# Patient Record
Sex: MALE | Race: WHITE | NOT HISPANIC OR LATINO | Employment: OTHER | URBAN - METROPOLITAN AREA
[De-identification: names, ages, dates, MRNs, and addresses within clinical notes are randomized per-mention and may not be internally consistent; named-entity substitution may affect disease eponyms.]

---

## 2017-01-23 ENCOUNTER — OFFICE VISIT (OUTPATIENT)
Dept: INTERNAL MEDICINE | Facility: CLINIC | Age: 77
End: 2017-01-23
Payer: COMMERCIAL

## 2017-01-23 VITALS
BODY MASS INDEX: 25.06 KG/M2 | OXYGEN SATURATION: 97 % | HEART RATE: 64 BPM | DIASTOLIC BLOOD PRESSURE: 64 MMHG | SYSTOLIC BLOOD PRESSURE: 126 MMHG | TEMPERATURE: 97.1 F | RESPIRATION RATE: 16 BRPM | HEIGHT: 71 IN | WEIGHT: 179 LBS

## 2017-01-23 DIAGNOSIS — K21.9 GASTROESOPHAGEAL REFLUX DISEASE WITHOUT ESOPHAGITIS: Primary | ICD-10-CM

## 2017-01-23 DIAGNOSIS — I10 ESSENTIAL HYPERTENSION WITH GOAL BLOOD PRESSURE LESS THAN 140/90: ICD-10-CM

## 2017-01-23 DIAGNOSIS — E78.5 HYPERLIPIDEMIA LDL GOAL <130: ICD-10-CM

## 2017-01-23 DIAGNOSIS — Z23 NEED FOR PROPHYLACTIC VACCINATION AND INOCULATION AGAINST INFLUENZA: ICD-10-CM

## 2017-01-23 DIAGNOSIS — E11.9 TYPE 2 DIABETES MELLITUS WITHOUT COMPLICATION, WITHOUT LONG-TERM CURRENT USE OF INSULIN (H): ICD-10-CM

## 2017-01-23 LAB
ALBUMIN SERPL-MCNC: 4.2 G/DL (ref 3.4–5)
ALP SERPL-CCNC: 54 U/L (ref 40–150)
ALT SERPL W P-5'-P-CCNC: 30 U/L (ref 0–70)
ANION GAP SERPL CALCULATED.3IONS-SCNC: 5 MMOL/L (ref 3–14)
AST SERPL W P-5'-P-CCNC: 15 U/L (ref 0–45)
BILIRUB SERPL-MCNC: 0.6 MG/DL (ref 0.2–1.3)
BUN SERPL-MCNC: 16 MG/DL (ref 7–30)
CALCIUM SERPL-MCNC: 9.2 MG/DL (ref 8.5–10.1)
CHLORIDE SERPL-SCNC: 102 MMOL/L (ref 94–109)
CHOLEST SERPL-MCNC: 113 MG/DL
CO2 SERPL-SCNC: 34 MMOL/L (ref 20–32)
CREAT SERPL-MCNC: 1.09 MG/DL (ref 0.66–1.25)
CREAT UR-MCNC: 205 MG/DL
GFR SERPL CREATININE-BSD FRML MDRD: 66 ML/MIN/1.7M2
GLUCOSE SERPL-MCNC: 133 MG/DL (ref 70–99)
HBA1C MFR BLD: 7 % (ref 4.3–6)
HDLC SERPL-MCNC: 39 MG/DL
LDLC SERPL CALC-MCNC: 60 MG/DL
MICROALBUMIN UR-MCNC: 9 MG/L
MICROALBUMIN/CREAT UR: 4.35 MG/G CR (ref 0–17)
NONHDLC SERPL-MCNC: 74 MG/DL
POTASSIUM SERPL-SCNC: 3.8 MMOL/L (ref 3.4–5.3)
PROT SERPL-MCNC: 7.2 G/DL (ref 6.8–8.8)
SODIUM SERPL-SCNC: 141 MMOL/L (ref 133–144)
TRIGL SERPL-MCNC: 69 MG/DL

## 2017-01-23 PROCEDURE — 36415 COLL VENOUS BLD VENIPUNCTURE: CPT | Performed by: INTERNAL MEDICINE

## 2017-01-23 PROCEDURE — 82043 UR ALBUMIN QUANTITATIVE: CPT | Performed by: INTERNAL MEDICINE

## 2017-01-23 PROCEDURE — 90471 IMMUNIZATION ADMIN: CPT | Performed by: INTERNAL MEDICINE

## 2017-01-23 PROCEDURE — G0008 ADMIN INFLUENZA VIRUS VAC: HCPCS | Performed by: INTERNAL MEDICINE

## 2017-01-23 PROCEDURE — 80053 COMPREHEN METABOLIC PANEL: CPT | Performed by: INTERNAL MEDICINE

## 2017-01-23 PROCEDURE — 83036 HEMOGLOBIN GLYCOSYLATED A1C: CPT | Performed by: INTERNAL MEDICINE

## 2017-01-23 PROCEDURE — 80061 LIPID PANEL: CPT | Performed by: INTERNAL MEDICINE

## 2017-01-23 PROCEDURE — 99214 OFFICE O/P EST MOD 30 MIN: CPT | Performed by: INTERNAL MEDICINE

## 2017-01-23 PROCEDURE — 90662 IIV NO PRSV INCREASED AG IM: CPT | Performed by: INTERNAL MEDICINE

## 2017-01-23 RX ORDER — AMILORIDE HYDROCHLORIDE AND HYDROCHLOROTHIAZIDE 5; 50 MG/1; MG/1
TABLET ORAL
Qty: 90 TABLET | Refills: 3 | Status: SHIPPED | OUTPATIENT
Start: 2017-01-23 | End: 2018-03-05

## 2017-01-23 RX ORDER — ATORVASTATIN CALCIUM 40 MG/1
40 TABLET, FILM COATED ORAL DAILY
Qty: 90 TABLET | Refills: 3 | Status: SHIPPED | OUTPATIENT
Start: 2017-01-23 | End: 2018-04-26

## 2017-01-23 ASSESSMENT — PAIN SCALES - GENERAL: PAINLEVEL: NO PAIN (0)

## 2017-01-23 NOTE — NURSING NOTE
"Chief Complaint   Patient presents with     Diabetes     Lipids     Hypertension       Initial /64 mmHg  Pulse 64  Temp(Src) 97.1  F (36.2  C) (Temporal)  Resp 16  Ht 5' 11.25\" (1.81 m)  Wt 179 lb (81.194 kg)  BMI 24.78 kg/m2  SpO2 97% Estimated body mass index is 24.78 kg/(m^2) as calculated from the following:    Height as of this encounter: 5' 11.25\" (1.81 m).    Weight as of this encounter: 179 lb (81.194 kg).  BP completed using cuff size: cori Brown MA    "

## 2017-01-23 NOTE — PROGRESS NOTES
SUBJECTIVE:                                                    Merlin J Koppendrayer is a 76 year old male who presents to clinic today for the following health issues:      Chief Complaint   Patient presents with     Diabetes     Lipids     Hypertension       He is doing ok, taking medications.  Weight is stable. Exercising 2 times a week and goes to the gym.  Does treadmill and machines.      Checking sugars daily, 128 today, up a little over the holidays.  Pretty good overall.      He denies any chest pains, sob, no n,v,d,c. Has some frequent urination, nocturia times one.    Past Medical History   Diagnosis Date     Unspecified essential hypertension      Unspecified hearing loss      Esophageal reflux      Inguinal hernia without mention of obstruction or gangrene, unilateral or unspecified, (not specified as recurrent)      s/p repair     Prostatitis, unspecified      Pyelonephritis, unspecified      Hypersomnia with sleep apnea, unspecified      Other isolated or specific phobias      claustrophobia     Mixed hyperlipidemia      Diabetic eye exam (H) 12/17/14     Diabetes (H)      Current Outpatient Prescriptions   Medication     omeprazole (PRILOSEC) 20 MG CR capsule     atorvastatin (LIPITOR) 40 MG tablet     aMILoride-hydrochlorothiazide (MODURETIC) 5-50 MG TABS per tablet     amLODIPine (NORVASC) 5 MG tablet     metFORMIN (GLUCOPHAGE) 500 MG tablet     potassium chloride SA (K-DUR/KLOR-CON M) 20 MEQ CR tablet     metoprolol (LOPRESSOR) 50 MG tablet     ASPIRIN 81 MG OR TABS     ACCU-CHEK SMARTVIEW test strip     [DISCONTINUED] atorvastatin (LIPITOR) 40 MG tablet     [DISCONTINUED] omeprazole (PRILOSEC) 20 MG capsule     [DISCONTINUED] aMILoride-hydrochlorothiazide (MODURETIC) 5-50 MG TABS     acetaminophen (TYLENOL) 325 MG tablet     ACCU-CHEK FASTCLIX LANCETS MISC     No current facility-administered medications for this visit.     Social History   Substance Use Topics     Smoking status: Former Smoker  "    Quit date: 01/01/1974     Smokeless tobacco: Never Used      Comment: Quit 1974      Alcohol Use: 8.4 oz/week     14 Standard drinks or equivalent per week     Review of Systems  Constitutional-No fevers, chills, or weight changes..  ENT-No earpain, sore throat, voice changes or rhinitis.  Cardiac-No chest pain or palpitations.  Respiratory-No cough, sob, or hemoptysis.  GI-No nausea, vomitting, diarrhea, constipation, or blood in the stool.  Musculoskeletal-No muscles aches or joint pains.  Skin-No rashes.    Physical Exam  /64 mmHg  Pulse 64  Temp(Src) 97.1  F (36.2  C) (Temporal)  Resp 16  Ht 5' 11.25\" (1.81 m)  Wt 179 lb (81.194 kg)  BMI 24.78 kg/m2  SpO2 97%  General Appearance-healthy, alert, no distress  ENT-ENT exam normal, no neck nodes or sinus tenderness  Cardiac-regular rate and rhythm  with normal S1, S2 ; no murmur, rub or gallops  Lungs-clear to auscultation  GI-Soft, nontender.  Normal bowel sounds.  No hepatosplenomegaly or abnormal masses  Extremities-no peripheral edema, peripheral pulses normal    ASSESSMENT:  GERD-he is doing ok, will have him try to cut down on the PPI use if he has no symptoms.  Then take a little more if there is a flare up.    Hyperlipidemia-has atorvastatin and is doing ok, will check lipids since he is fasting, will also check lfts and refill for the year.    HTN is controlled on three medications, will check renal function, continue to exercise, keep weight down and refill medications when needed.    Diabetes is doing well, just on metformin, todays sugar was good at 128, goes up with dietary lapses, will check a hgba1c today.  Not on a ace inhibitor but as long as microalbumin is ok, will wait on lisinopril.    Electronically signed by Zaid Oneill MD        Electronically signed by Zaid Oneill MD    "

## 2017-01-23 NOTE — PROGRESS NOTES
Injectable Influenza Immunization Documentation    1.  Is the person to be vaccinated sick today?  No    2. Does the person to be vaccinated have an allergy to eggs or to a component of the vaccine?  No    3. Has the person to be vaccinated today ever had a serious reaction to influenza vaccine in the past?  No    4. Has the person to be vaccinated ever had Guillain-[place] syndrome?  No     Form completed by Maryann Brown MA

## 2017-01-23 NOTE — MR AVS SNAPSHOT
"              After Visit Summary   1/23/2017    Merlin J Koppendrayer    MRN: [id number]           Patient Information     Date Of Birth          1940        Visit Information        Provider Department      1/23/2017 8:15 AM Zaid Oneill MD [facility]        Today's Diagnoses     Gastroesophageal reflux disease without esophagitis    -  1        Follow-ups after your visit        Who to contact     If you have questions or need follow up information about today's clinic visit or your schedule please contact [facility] directly at [phone].  Normal or non-critical lab and imaging results will be communicated to you by [employer], letter or phone within 4 business days after the clinic has received the results. If you do not hear from us within 7 days, please contact the clinic through [employer] or phone. If you have a critical or abnormal lab result, we will notify you by phone as soon as possible.  Submit refill requests through "[employer]" or call your pharmacy and they will forward the refill request to us. Please allow 3 business days for your refill to be completed.          Additional Information About Your Visit        MyChart Information     "[employer]" gives you secure access to your electronic health record. If you see a primary care provider, you can also send messages to your care team and make appointments. If you have questions, please call your primary care clinic.  If you do not have a primary care provider, please call [phone] and they will assist you.        Care EveryWhere ID     This is your Care EveryWhere ID. This could be used by other organizations to access your [place] medical records  [id number]        Your Vitals Were     Pulse Temperature Respirations Height BMI (Body Mass Index) Pulse Oximetry    64 97.1  F (36.2  C) (Temporal) 16 5' 11.25\" (1.81 m) 24.78 kg/m2 97%       Blood Pressure from Last 3 Encounters:   01/23/17 126/64   01/20/16 128/68 "   08/25/15 110/76    Weight from Last 3 Encounters:   01/23/17 179 lb (81.194 kg)   01/20/16 178 lb (80.74 kg)   09/02/15 175 lb (79.379 kg)              Today, you had the following     No orders found for display       Primary Care Provider Office Phone # Fax #    Zaid Oneill [phone]       [place] [id number] [place] [address]        Thank you!     Thank you for choosing [facility]  for your care. Our goal is always to provide you with excellent care. Hearing back from our patients is one way we can continue to improve our services. Please take a few minutes to complete the written survey that you may receive in the mail after your visit with us. Thank you!             Your Updated Medication List - Protect others around you: Learn how to safely use, store and throw away your medicines at www.disposemymeds.org.          This list is accurate as of: 1/23/17  8:25 AM.  Always use your most recent med list.                   Brand Name Dispense Instructions for use    ACCU-CHEK SMARTVIEW test strip   Generic drug:  blood glucose monitoring     200 each    USE ONE STRIP TWICE DAILY TO  CHECK  BLOOD  SUGAR  VARYING  THE  TIMES       aMILoride-hydrochlorothiazide 5-50 MG Tabs per tablet    MODURETIC    90 tablet    TAKE 1 TABLET EVERY DAY       amLODIPine 5 MG tablet    NORVASC    90 tablet    TAKE 1 TABLET EVERY DAY       aspirin 81 MG tablet     100    1 tab po QD (Once per day)       atorvastatin 40 MG tablet    LIPITOR    90 tablet    Take 1 tablet (40 mg) by mouth daily       blood glucose monitoring lancets     250 each    Use 1 needle 2-3 times       metFORMIN 500 MG tablet    GLUCOPHAGE    90 tablet    TAKE 1 TABLET  DAILY  WITH  DINNER       metoprolol 50 MG tablet    LOPRESSOR    270 tablet    Take 2 in the AM and 1 at night       omeprazole 20 MG CR capsule    priLOSEC    90 capsule    Take 1 capsule (20 mg) by mouth daily       potassium  chloride SA 20 MEQ CR tablet    K-DUR/KLOR-CON M    270 tablet    TAKE 1 TABLET THREE TIMES DAILY       TYLENOL 325 MG tablet   Generic drug:  acetaminophen      Take 325-650 mg by mouth every 6 hours as needed for mild pain

## 2017-01-23 NOTE — PROGRESS NOTES
Screening Questionnaire for Adult Immunization    Are you sick today?   No   Do you have allergies to medications, food, a vaccine component or latex?   Yes   Have you ever had a serious reaction after receiving a vaccination?   No   Do you have a long-term health problem with heart disease, lung disease, asthma, kidney disease, metabolic disease (e.g. diabetes), anemia, or other blood disorder?   Yes   Do you have cancer, leukemia, HIV/AIDS, or any other immune system problem?   No   In the past 3 months, have you taken medications that affect  your immune system, such as prednisone, other steroids, or anticancer drugs; drugs for the treatment of rheumatoid arthritis, Crohn s disease, or psoriasis; or have you had radiation treatments?   No   Have you had a seizure, or a brain or other nervous system problem?   No   During the past year, have you received a transfusion of blood or blood     products, or been given immune (gamma) globulin or antiviral drug?   No   For women: Are you pregnant or is there a chance you could become        pregnant during the next month?   No   Have you received any vaccinations in the past 4 weeks?   No     Immunization questionnaire was positive for at least one answer.  Notified yes.      MNVFC doesn't apply on this patient    Per orders of Dr. Zaid Oneill, injection of influenza given by Maryann Borwn. Patient instructed to remain in clinic for 20 minutes afterwards, and to report any adverse reaction to me immediately.       Screening performed by Maryann Brown on 1/23/2017 at 9:05 AM.

## 2017-02-09 ENCOUNTER — TELEPHONE (OUTPATIENT)
Dept: INTERNAL MEDICINE | Facility: CLINIC | Age: 77
End: 2017-02-09

## 2017-02-09 NOTE — TELEPHONE ENCOUNTER
Reason for call:  Patient reporting a symptom    Symptom or request: Headache and neck ache    Duration (how long have symptoms been present): 4 days    Have you been treated for this before? No    Additional comments: Pt's wife is calling trying to make appt either today or tomorrow in [place] or [place]. There are no openings. They aren't sure what's going on. Please call and advise.     Phone Number patient can be reached at:  Home number on file [phone] (home)    Best Time:  anytime    Can we leave a detailed message on this number:  Not Applicable    Call taken on 2/9/2017 at 11:59 AM by Geeta Brown

## 2017-02-09 NOTE — TELEPHONE ENCOUNTER
": 1940  PHONE #'s: [phone] (home)     PRESENTING PROBLEM:  Headache since Monday. NO Hx of migraine headaches.     NURSING ASSESSMENT  Description:\"Just kind of dull.\"  Onset/duration:  17  Precip. factors:  Etiology unknown. Denies injury. NO Hx of migraines.   Assoc. Sx:  NO fever, no one sided weakness, no vision changes. Some light sensitivity. NO chest pain or jaw pain.  \" I do kind of have a sttiff neck  Improves/worsens Sx:  Worse, that's why calling. Can't seem to get rid of his headache.   Pain scale (1-10)   4/10  Sx specific meds:  Has tried Ibuprofen, ASA, Tylenol  Last exam/Tx:  Has NOT been seen for this.     RECOMMENDED DISPOSITION:  See in 24 hours - Given appt at [facility] on 2/10/17.   Will comply with recommendation: YES  If further questions/concerns or if Sx do not improve, worsen or new Sx develop, call your PCP or [facility] as soon as possible.    NOTES:  Disposition was determined by the first positive assessment question, therefore all previous assessment questions were negative.  Informed to check provider manual or call insurance company to assure coverage.    Guideline used: Headache  Telephone Triage Protocols for Nurses, Fifth Edition, Angie Du RN  2017    "

## 2017-02-10 ENCOUNTER — OFFICE VISIT (OUTPATIENT)
Dept: FAMILY MEDICINE | Facility: OTHER | Age: 77
End: 2017-02-10
Payer: COMMERCIAL

## 2017-02-10 VITALS
DIASTOLIC BLOOD PRESSURE: 62 MMHG | TEMPERATURE: 97.8 F | RESPIRATION RATE: 20 BRPM | WEIGHT: 176 LBS | HEART RATE: 66 BPM | BODY MASS INDEX: 24.37 KG/M2 | SYSTOLIC BLOOD PRESSURE: 130 MMHG | OXYGEN SATURATION: 98 %

## 2017-02-10 DIAGNOSIS — R51.9 ACUTE INTRACTABLE HEADACHE, UNSPECIFIED HEADACHE TYPE: Primary | ICD-10-CM

## 2017-02-10 DIAGNOSIS — R29.818 OTHER SYMPTOMS AND SIGNS INVOLVING THE NERVOUS SYSTEM: ICD-10-CM

## 2017-02-10 DIAGNOSIS — R51.0 POSITIONAL HEADACHE: ICD-10-CM

## 2017-02-10 DIAGNOSIS — G50.1 ATYPICAL FACIAL PAIN: ICD-10-CM

## 2017-02-10 PROCEDURE — 99214 OFFICE O/P EST MOD 30 MIN: CPT | Performed by: NURSE PRACTITIONER

## 2017-02-10 ASSESSMENT — PAIN SCALES - GENERAL: PAINLEVEL: MODERATE PAIN (5)

## 2017-02-10 NOTE — MR AVS SNAPSHOT
After Visit Summary   2/10/2017    Merlin J Koppendrayer    MRN: [id number]           Patient Information     Date Of Birth          1940        Visit Information        Provider Department      2/10/2017 2:20 PM Ariana Campoverde APRN CNP [facility]        Today's Diagnoses     Acute intractable headache, unspecified headache type    -  1       Care Instructions    Schedule the MRI at the [facility].             Follow-ups after your visit        Future tests that were ordered for you today     Open Future Orders        Priority Expected Expires Ordered    MRA Head w/o Contrast Angiogram Routine  2/10/2018 2/10/2017    MR Brain w/o & w Contrast Routine  2/10/2018 2/10/2017            Who to contact     If you have questions or need follow up information about today's clinic visit or your schedule please contact [facility] directly at [phone].  Normal or non-critical lab and imaging results will be communicated to you by MyChart, letter or phone within 4 business days after the clinic has received the results. If you do not hear from us within 7 days, please contact the clinic through [employer] or phone. If you have a critical or abnormal lab result, we will notify you by phone as soon as possible.  Submit refill requests through [employer] or call your pharmacy and they will forward the refill request to us. Please allow 3 business days for your refill to be completed.          Additional Information About Your Visit        MyChart Information     [employer] gives you secure access to your electronic health record. If you see a primary care provider, you can also send messages to your care team and make appointments. If you have questions, please call your primary care clinic.  If you do not have a primary care provider, please call [phone] and they will assist you.        Care EveryWhere ID     This is your Care EveryWhere ID. This could be used by other  organizations to access your [place] medical records  [id number]        Your Vitals Were     Pulse Temperature Respirations Pulse Oximetry          66 97.8  F (36.6  C) (Tympanic) 20 98%         Blood Pressure from Last 3 Encounters:   02/10/17 130/62   01/23/17 126/64   01/20/16 128/68    Weight from Last 3 Encounters:   02/10/17 176 lb (79.833 kg)   01/23/17 179 lb (81.194 kg)   01/20/16 178 lb (80.74 kg)               Primary Care Provider Office Phone # Fax #    Zaid Oneill [phone]       [place] [id number] [place] [address]        Thank you!     Thank you for choosing [facility]  for your care. Our goal is always to provide you with excellent care. Hearing back from our patients is one way we can continue to improve our services. Please take a few minutes to complete the written survey that you may receive in the mail after your visit with us. Thank you!             Your Updated Medication List - Protect others around you: Learn how to safely use, store and throw away your medicines at www.disposemymeds.org.          This list is accurate as of: 2/10/17  3:00 PM.  Always use your most recent med list.                   Brand Name Dispense Instructions for use    ACCU-CHEK SMARTVIEW test strip   Generic drug:  blood glucose monitoring     200 each    USE ONE STRIP TWICE DAILY TO  CHECK  BLOOD  SUGAR  VARYING  THE  TIMES       aMILoride-hydrochlorothiazide 5-50 MG Tabs per tablet    MODURETIC    90 tablet    TAKE 1 TABLET EVERY DAY       amLODIPine 5 MG tablet    NORVASC    90 tablet    TAKE 1 TABLET EVERY DAY       aspirin 81 MG tablet     100    1 tab po QD (Once per day)       atorvastatin 40 MG tablet    LIPITOR    90 tablet    Take 1 tablet (40 mg) by mouth daily       blood glucose monitoring lancets     250 each    Use 1 needle 2-3 times       metFORMIN 500 MG tablet    GLUCOPHAGE    90 tablet    TAKE 1 TABLET  DAILY  WITH  DINNER        metoprolol 50 MG tablet    LOPRESSOR    270 tablet    Take 2 in the AM and 1 at night       omeprazole 20 MG CR capsule    priLOSEC    90 capsule    Take 1 capsule (20 mg) by mouth daily       potassium chloride SA 20 MEQ CR tablet    K-DUR/KLOR-CON M    270 tablet    TAKE 1 TABLET THREE TIMES DAILY       TYLENOL 325 MG tablet   Generic drug:  acetaminophen      Take 325-650 mg by mouth every 6 hours as needed for mild pain

## 2017-02-10 NOTE — NURSING NOTE
"Chief Complaint   Patient presents with     Headache       Initial /62 mmHg  Pulse 66  Temp(Src) 97.8  F (36.6  C) (Tympanic)  Resp 20  Wt 176 lb (79.833 kg)  SpO2 98% Estimated body mass index is 24.37 kg/(m^2) as calculated from the following:    Height as of 1/23/17: 5' 11.25\" (1.81 m).    Weight as of this encounter: 176 lb (79.833 kg).  Medication Reconciliation: complete   ................Panda Lorenz LPN,   February 10, 2017,      2:48 PM,   [facility]      "

## 2017-02-10 NOTE — PROGRESS NOTES
SUBJECTIVE:                                                    Merlin J Koppendrayer is a 76 year old male who presents to clinic today for the following health issues:      Headaches      Duration: 5 days    Description  Location: back of head.  Does go into shoulder and neck a little bit  Character: dull pain  Frequency:  Every day this week  Duration:  4-5 hrs    Intensity:  moderate, 5/10    Accompanying signs and symptoms:    Precipitating or Alleviating factors:  Nausea/vomiting: no  Dizziness: no  Weakness or numbness: no  Visual changes: none  Fever: no   Sinus or URI symptoms no     History  Head trauma: no  Family history of migraines: no  Previous tests for headaches: no  Neurologist evaluations: no  Able to do daily activities when headache present: no  Wake with headaches: YES- slight  Daily pain medication use: YES  Any changes in: none    Precipitating or Alleviating factors (light/sound/sleep/caffeine): light sensitive    Therapies tried and outcome: Aspirin, Ibuprofen (Advil, Motrin) and Tylenol    Outcome - ibuprofen helped  Frequent/daily pain medication use: YES     No history of headaches or migraines.  Now has new onset headache x 5 days.  It is improved by Ibuprofen, but never goes completely away.  No other symptoms at all.  Has not been ill recently.  Headache is worse with exertion and position changes.     Problem list and histories reviewed & adjusted, as indicated.  Additional history: none    Patient Active Problem List   Diagnosis     Hearing loss     Inguinal hernia     Prostatitis     Pyelonephritis     HYPERLIPIDEMIA LDL GOAL <130     Advanced directives, counseling/discussion     MICHELLE (obstructive sleep apnea)     Anxiety attack     Type 2 diabetes mellitus without complications (H)     Gastroesophageal reflux disease without esophagitis     Essential hypertension with goal blood pressure less than 140/90     Past Surgical History   Procedure Laterality Date     C lap,hernia  repair proc,unlist       Colonoscopy  04/15/09     Excise mass foot Right 7/10/2015     Procedure: EXCISE MASS FOOT;  Surgeon: Nickolas Farah DPM;  Location: [facility] OR     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     Procedure: REPAIR SYNDACTYLY FOOT;  Surgeon: Nickolas Farah DPM;  Location: [facility] OR     Amputate toe(s) Right 7/31/2015     Procedure: AMPUTATE TOE(S);  Surgeon: Neal Slater MD;  Location: [facility] OR       Social History   Substance Use Topics     Smoking status: Former Smoker     Quit date: 01/01/1974     Smokeless tobacco: Never Used      Comment: Quit 1974      Alcohol Use: 8.4 oz/week     14 Standard drinks or equivalent per week     Family History   Problem Relation Age of Onset     DIABETES Father      DIABETES Mother      CEREBROVASCULAR DISEASE Mother      Cancer - colorectal Maternal Uncle      Connective Tissue Disorder Paternal Uncle      lupus     Connective Tissue Disorder Other      nieces and nephews with lupus     C.A.D. Brother      x 2         Current Outpatient Prescriptions   Medication Sig Dispense Refill     omeprazole (PRILOSEC) 20 MG CR capsule Take 1 capsule (20 mg) by mouth daily 90 capsule 3     atorvastatin (LIPITOR) 40 MG tablet Take 1 tablet (40 mg) by mouth daily 90 tablet 3     aMILoride-hydrochlorothiazide (MODURETIC) 5-50 MG TABS per tablet TAKE 1 TABLET EVERY DAY 90 tablet 3     amLODIPine (NORVASC) 5 MG tablet TAKE 1 TABLET EVERY DAY 90 tablet 0     metFORMIN (GLUCOPHAGE) 500 MG tablet TAKE 1 TABLET  DAILY  WITH  DINNER 90 tablet 0     potassium chloride SA (K-DUR/KLOR-CON M) 20 MEQ CR tablet TAKE 1 TABLET THREE TIMES DAILY 270 tablet 3     metoprolol (LOPRESSOR) 50 MG tablet Take 2 in the AM and 1 at night 270 tablet 0     ACCU-CHEK SMARTVIEW test strip USE ONE STRIP TWICE DAILY TO  CHECK  BLOOD  SUGAR  VARYING  THE  TIMES 200 each 1     acetaminophen (TYLENOL) 325 MG tablet Take 325-650 mg by mouth every 6 hours as needed for mild pain       ACCU-CHEK  FASTCLIX LANCETS MISC Use 1 needle 2-3 times 250 each 3     ASPIRIN 81 MG OR TABS 1 tab po QD (Once per day) 100 3     Allergies   Allergen Reactions     Sulfa Drugs      rash     BP Readings from Last 3 Encounters:   02/10/17 130/62   01/23/17 126/64   01/20/16 128/68    Wt Readings from Last 3 Encounters:   02/10/17 176 lb (79.833 kg)   01/23/17 179 lb (81.194 kg)   01/20/16 178 lb (80.74 kg)            ROS:  C: NEGATIVE for fever, chills, change in weight  E/M: NEGATIVE for ear, mouth and throat problems  R: NEGATIVE for significant cough or SOB  CV: NEGATIVE for chest pain, palpitations or peripheral edema  NEURO: POSITIVE for headache as above, NEGATIVE for weakness, dizziness, vision changes or numbness     OBJECTIVE:                                                    /62 mmHg  Pulse 66  Temp(Src) 97.8  F (36.6  C) (Tympanic)  Resp 20  Wt 176 lb (79.833 kg)  SpO2 98%  Body mass index is 24.37 kg/(m^2).  GENERAL: healthy, alert and no distress  HENT: ear canals and TM's normal, nose and mouth without ulcers or lesions  NECK: no adenopathy, no asymmetry, masses, or scars and thyroid normal to palpation  RESP: lungs clear to auscultation - no rales, rhonchi or wheezes  CV: regular rate and rhythm, normal S1 S2, no S3 or S4, no murmur, click or rub, no peripheral edema and peripheral pulses strong  MS: no gross musculoskeletal defects noted, no edema  NEURO: Normal strength and tone, sensory exam grossly normal, mentation intact, cranial nerves 2-12 intact, DTR's normal and symmetric , Romberg normal and rapid alternating movements normal    Diagnostic Test Results:  none      ASSESSMENT/PLAN:                                                        1. Acute intractable headache, unspecified headache type  Given his age and no history of migraines, needs imaging.  Will obtain MRI/MRA.   - MRA Head w/o Contrast Angiogram; Future  - MR Brain w/o & w Contrast; Future    See Patient Instructions  Follow up  with be based on imaging results.     JOSE F Parrish CNP  [facility]

## 2017-02-15 ENCOUNTER — HOSPITAL ENCOUNTER (OUTPATIENT)
Dept: MRI IMAGING | Facility: CLINIC | Age: 77
End: 2017-02-15
Attending: NURSE PRACTITIONER
Payer: MEDICARE

## 2017-02-15 ENCOUNTER — HOSPITAL ENCOUNTER (OUTPATIENT)
Dept: MRI IMAGING | Facility: CLINIC | Age: 77
Discharge: HOME | End: 2017-02-15
Attending: NURSE PRACTITIONER | Admitting: NURSE PRACTITIONER
Payer: MEDICARE

## 2017-02-15 DIAGNOSIS — R51.0 POSITIONAL HEADACHE: ICD-10-CM

## 2017-02-15 DIAGNOSIS — G50.1 ATYPICAL FACE PAIN: ICD-10-CM

## 2017-02-15 DIAGNOSIS — R51.9 NEW ONSET OF HEADACHES AFTER AGE 50: Primary | ICD-10-CM

## 2017-02-15 DIAGNOSIS — G50.1 ATYPICAL FACE PAIN: Primary | ICD-10-CM

## 2017-02-15 DIAGNOSIS — R51.9 ACUTE INTRACTABLE HEADACHE, UNSPECIFIED HEADACHE TYPE: ICD-10-CM

## 2017-02-15 PROCEDURE — 25500064 ZZH RX 255 OP 636: Performed by: RADIOLOGY

## 2017-02-15 PROCEDURE — A9585 GADOBUTROL INJECTION: HCPCS | Performed by: RADIOLOGY

## 2017-02-15 PROCEDURE — 70544 MR ANGIOGRAPHY HEAD W/O DYE: CPT | Mod: XS

## 2017-02-15 PROCEDURE — 70553 MRI BRAIN STEM W/O & W/DYE: CPT

## 2017-02-15 RX ORDER — GADOBUTROL 604.72 MG/ML
7.5 INJECTION INTRAVENOUS ONCE
Status: COMPLETED | OUTPATIENT
Start: 2017-02-15 | End: 2017-02-15

## 2017-02-15 RX ADMIN — GADOBUTROL 7.5 ML: 604.72 INJECTION INTRAVENOUS at 09:32

## 2017-02-16 NOTE — PROGRESS NOTES
Called pt - informed of MRI result and recommendations. Patient voiced understanding.   ................Panda Lorenz LPN,   February 16, 2017,      8:43 AM,   [facility]

## 2017-04-30 ENCOUNTER — MEDICAL CORRESPONDENCE (OUTPATIENT)
Dept: HEALTH INFORMATION MANAGEMENT | Facility: CLINIC | Age: 77
End: 2017-04-30

## 2017-06-21 ENCOUNTER — TRANSFERRED RECORDS (OUTPATIENT)
Dept: HEALTH INFORMATION MANAGEMENT | Facility: CLINIC | Age: 77
End: 2017-06-21

## 2017-07-17 ENCOUNTER — TELEPHONE (OUTPATIENT)
Dept: INTERNAL MEDICINE | Facility: CLINIC | Age: 77
End: 2017-07-17

## 2017-07-17 DIAGNOSIS — E11.9 TYPE 2 DIABETES MELLITUS WITHOUT COMPLICATION, WITHOUT LONG-TERM CURRENT USE OF INSULIN (H): Primary | ICD-10-CM

## 2017-07-17 NOTE — TELEPHONE ENCOUNTER
Reason for Call: Request for an order or referral:    Order or referral being requested: A1C    Date needed: as soon as possible    Has the patient been seen by the PCP for this problem? YES    Additional comments: Pt received a letter stating he needs his A1C checked before 7/23/17. Can you place the order for him please?     Phone number Patient can be reached at:  Other phone number:      Best Time:  anytime    Can we leave a detailed message on this number?  YES    Call taken on 7/17/2017 at 11:30 AM by Geeta Johnson

## 2017-07-19 DIAGNOSIS — E11.9 TYPE 2 DIABETES MELLITUS WITHOUT COMPLICATION, WITHOUT LONG-TERM CURRENT USE OF INSULIN (H): ICD-10-CM

## 2017-07-19 LAB — HBA1C MFR BLD: 7.1 % (ref 4.3–6)

## 2017-07-19 PROCEDURE — 36415 COLL VENOUS BLD VENIPUNCTURE: CPT | Performed by: INTERNAL MEDICINE

## 2017-07-19 PROCEDURE — 83036 HEMOGLOBIN GLYCOSYLATED A1C: CPT | Performed by: INTERNAL MEDICINE

## 2017-10-19 ENCOUNTER — TELEPHONE (OUTPATIENT)
Dept: FAMILY MEDICINE | Facility: CLINIC | Age: 77
End: 2017-10-19

## 2017-10-19 DIAGNOSIS — Z12.11 ENCOUNTER FOR SCREENING COLONOSCOPY: Primary | ICD-10-CM

## 2017-10-19 NOTE — TELEPHONE ENCOUNTER
Reason for Call: Request for an order or referral:    Order or referral being requested: Colonoscopy- patient received letter from [facility] stating he was due for colonoscopy. Need orders placed.    Date needed: at your convenience    Has the patient been seen by the PCP for this problem? YES    Additional comments: none    Phone number Patient can be reached at:  Home number on file [phone] (home)    Best Time:  any    Can we leave a detailed message on this number?  YES    Call taken on 10/19/2017 at 2:24 PM by Malathi Ramos

## 2017-10-20 ENCOUNTER — TELEPHONE (OUTPATIENT)
Dept: INTERNAL MEDICINE | Facility: CLINIC | Age: 77
End: 2017-10-20

## 2017-10-20 DIAGNOSIS — Z12.11 SPECIAL SCREENING FOR MALIGNANT NEOPLASMS, COLON: Primary | ICD-10-CM

## 2017-10-20 NOTE — TELEPHONE ENCOUNTER
Scheduled 11/1, prep mailed    Due to age, please call in Golyetly prep to [facility]     Thank you

## 2017-10-22 RX ORDER — BISACODYL 5 MG/1
15 TABLET, DELAYED RELEASE ORAL ONCE
Qty: 4 TABLET | Refills: 0 | Status: SHIPPED | OUTPATIENT
Start: 2017-10-22 | End: 2017-10-22

## 2017-10-27 ENCOUNTER — OFFICE VISIT (OUTPATIENT)
Dept: FAMILY MEDICINE | Facility: OTHER | Age: 77
End: 2017-10-27
Payer: COMMERCIAL

## 2017-10-27 VITALS
HEIGHT: 71 IN | HEART RATE: 60 BPM | TEMPERATURE: 97.8 F | WEIGHT: 180.3 LBS | DIASTOLIC BLOOD PRESSURE: 80 MMHG | BODY MASS INDEX: 25.24 KG/M2 | SYSTOLIC BLOOD PRESSURE: 136 MMHG | RESPIRATION RATE: 16 BRPM

## 2017-10-27 DIAGNOSIS — Z23 NEED FOR PROPHYLACTIC VACCINATION AND INOCULATION AGAINST INFLUENZA: ICD-10-CM

## 2017-10-27 DIAGNOSIS — Z13.89 SCREENING FOR DIABETIC PERIPHERAL NEUROPATHY: Primary | ICD-10-CM

## 2017-10-27 DIAGNOSIS — E11.9 TYPE 2 DIABETES MELLITUS WITHOUT COMPLICATION, WITHOUT LONG-TERM CURRENT USE OF INSULIN (H): ICD-10-CM

## 2017-10-27 DIAGNOSIS — M54.41 ACUTE RIGHT-SIDED LOW BACK PAIN WITH RIGHT-SIDED SCIATICA: ICD-10-CM

## 2017-10-27 DIAGNOSIS — S46.319A TRICEPS STRAIN, INITIAL ENCOUNTER: ICD-10-CM

## 2017-10-27 LAB — TSH SERPL DL<=0.005 MIU/L-ACNC: 3.6 MU/L (ref 0.4–4)

## 2017-10-27 PROCEDURE — 99213 OFFICE O/P EST LOW 20 MIN: CPT | Mod: 25 | Performed by: PHYSICIAN ASSISTANT

## 2017-10-27 PROCEDURE — 84443 ASSAY THYROID STIM HORMONE: CPT | Performed by: PHYSICIAN ASSISTANT

## 2017-10-27 PROCEDURE — 90662 IIV NO PRSV INCREASED AG IM: CPT | Performed by: PHYSICIAN ASSISTANT

## 2017-10-27 PROCEDURE — 36415 COLL VENOUS BLD VENIPUNCTURE: CPT | Performed by: PHYSICIAN ASSISTANT

## 2017-10-27 PROCEDURE — G0008 ADMIN INFLUENZA VIRUS VAC: HCPCS | Performed by: PHYSICIAN ASSISTANT

## 2017-10-27 PROCEDURE — 99207 C FOOT EXAM  NO CHARGE: CPT | Performed by: PHYSICIAN ASSISTANT

## 2017-10-27 RX ORDER — CYCLOBENZAPRINE HCL 10 MG
10 TABLET ORAL 3 TIMES DAILY PRN
Qty: 30 TABLET | Refills: 0 | Status: SHIPPED | OUTPATIENT
Start: 2017-10-27 | End: 2018-05-29

## 2017-10-27 RX ORDER — IBUPROFEN 800 MG/1
800 TABLET, FILM COATED ORAL EVERY 8 HOURS PRN
Qty: 90 TABLET | Refills: 1 | Status: SHIPPED | OUTPATIENT
Start: 2017-10-27 | End: 2018-09-21

## 2017-10-27 ASSESSMENT — PAIN SCALES - GENERAL: PAINLEVEL: MILD PAIN (3)

## 2017-10-27 NOTE — PROGRESS NOTES
Prior to injection verified patient identity using patient's name and date of birth.    Injectable Influenza Immunization Documentation    1.  Is the person to be vaccinated sick today?   No    2. Does the person to be vaccinated have an allergy to a component   of the vaccine?   No  Egg Allergy Algorithm Link    3. Has the person to be vaccinated ever had a serious reaction   to influenza vaccine in the past?   No    4. Has the person to be vaccinated ever had Guillain-Barré syndrome?   No    Form completed by Leigha Manzano [facility] ([facility])  Per orders of Michael Cuellar, injection of Flu given by Leigha Manzano. Patient instructed to remain in clinic for 15 minutes afterwards, and to report any adverse reaction to me immediately.

## 2017-10-27 NOTE — NURSING NOTE
"Chief Complaint   Patient presents with     Musculoskeletal Problem     Panel Management     AAA Screen, Foot exam, TSH, Honoring choices, Flu       Initial /80 (Cuff Size: Adult Regular)  Pulse 60  Temp 97.8  F (36.6  C) (Temporal)  Resp 16  Ht 5' 11.25\" (1.81 m)  Wt 180 lb 4.8 oz (81.8 kg)  BMI 24.97 kg/m2 Estimated body mass index is 24.97 kg/(m^2) as calculated from the following:    Height as of this encounter: 5' 11.25\" (1.81 m).    Weight as of this encounter: 180 lb 4.8 oz (81.8 kg).  Medication Reconciliation: complete   Leigha Manzano CMA (AAMA)    "

## 2017-10-27 NOTE — PROGRESS NOTES
SUBJECTIVE:                                                    Merlin J Koppendrayer is a 77 year old male who presents to clinic today for the following health issues:      HPI    Joint Pain    Onset: 10/11/17    Description:   Location: left shoulder muscle (bicep), right lower back  Character: Cramping and spasming    Intensity: mild, severe    Progression of Symptoms: intermittent    Accompanying Signs & Symptoms:  Other symptoms: none    History:   Previous similar pain: YES      Precipitating factors:   Trauma or overuse: YES- Fall outdoor work    Alleviating factors:  Improved by: heat and Ibuprofen    Therapies Tried and outcome: ibuprofen, heat          Problem list and histories reviewed & adjusted, as indicated.  Additional history: as documented    Patient Active Problem List   Diagnosis     Hearing loss     Inguinal hernia     Prostatitis     Pyelonephritis     HYPERLIPIDEMIA LDL GOAL <130     Advanced directives, counseling/discussion     MICHELLE (obstructive sleep apnea)     Anxiety attack     Type 2 diabetes mellitus without complications (H)     Gastroesophageal reflux disease without esophagitis     Essential hypertension with goal blood pressure less than 140/90     Acute right-sided low back pain with right-sided sciatica     Triceps strain, initial encounter     Past Surgical History:   Procedure Laterality Date     AMPUTATE TOE(S) Right 7/31/2015    Procedure: AMPUTATE TOE(S);  Surgeon: Neal Slater MD;  Location: [facility] OR     C LAP,HERNIA REPAIR PROC,UNLIST       COLONOSCOPY  04/15/09     EXCISE MASS FOOT Right 7/10/2015    Procedure: EXCISE MASS FOOT;  Surgeon: Nickolas Farah DPM;  Location: [facility] OR     REPAIR SYNDACTYLY FOOT Right 7/10/2015    Procedure: REPAIR SYNDACTYLY FOOT;  Surgeon: Nickolas Farah DPM;  Location:  OR       Social History   Substance Use Topics     Smoking status: Former Smoker     Quit date: 1/1/1974     Smokeless tobacco: Never Used      Comment: Quit  "1974      Alcohol use 8.4 oz/week     14 Standard drinks or equivalent per week     Family History   Problem Relation Age of Onset     DIABETES Mother      CEREBROVASCULAR DISEASE Mother      DIABETES Father      Cancer - colorectal Maternal Uncle      Connective Tissue Disorder Paternal Uncle      lupus     Connective Tissue Disorder Other      nieces and nephews with lupus     C.A.D. Brother      x 2             ROS:  Constitutional, HEENT, cardiovascular, pulmonary, gi and gu systems are negative, except as otherwise noted.      OBJECTIVE:   /80 (Cuff Size: Adult Regular)  Pulse 60  Temp 97.8  F (36.6  C) (Temporal)  Resp 16  Ht 5' 11.25\" (1.81 m)  Wt 180 lb 4.8 oz (81.8 kg)  BMI 24.97 kg/m2  Body mass index is 24.97 kg/(m^2).  GENERAL: healthy, alert and no distress  NECK: no adenopathy, no asymmetry, masses, or scars and trachea midline and normal to palpation  RESP: lungs clear to auscultation - no rales, rhonchi or wheezes  CV: regular rate and rhythm, normal S1 S2, no S3 or S4, no murmur, click or rub, no peripheral edema and peripheral pulses strong  MS: no gross musculoskeletal defects noted, no edema, mild muscle tenderness to the left triceps region and right lumbar spine muscles  SKIN: no suspicious lesions or rashes  NEURO: Normal strength and tone, mentation intact and speech normal  PSYCH: mentation appears normal, affect normal/bright  DIABETIC FOOT: WNL today.    Diagnostic Test Results:  No results found for this or any previous visit (from the past 24 hour(s)).    ASSESSMENT/PLAN:     1. Screening for diabetic peripheral neuropathy  Normal exam.  - FOOT EXAM  NO CHARGE [18641.114]    2. Triceps strain, initial encounter  Suspected as it is getting better since resting over the last 24 hours  - ibuprofen (ADVIL/MOTRIN) 800 MG tablet; Take 1 tablet (800 mg) by mouth every 8 hours as needed for moderate pain  Dispense: 90 tablet; Refill: 1  - cyclobenzaprine (FLEXERIL) 10 MG tablet; Take " 1 tablet (10 mg) by mouth 3 times daily as needed for muscle spasms  Dispense: 30 tablet; Refill: 0    3. Acute right-sided low back pain with right-sided sciatica  Suspected as it is getting better since resting over the last 24 hours  - ibuprofen (ADVIL/MOTRIN) 800 MG tablet; Take 1 tablet (800 mg) by mouth every 8 hours as needed for moderate pain  Dispense: 90 tablet; Refill: 1  - cyclobenzaprine (FLEXERIL) 10 MG tablet; Take 1 tablet (10 mg) by mouth 3 times daily as needed for muscle spasms  Dispense: 30 tablet; Refill: 0    4. Type 2 diabetes mellitus without complication, without long-term current use of insulin (H)  Due for labs.  - TSH WITH FREE T4 REFLEX    ROV 2 weeks if not completely resolved.    Michael Cuellar PA-C  [facility]

## 2017-10-27 NOTE — MR AVS SNAPSHOT
After Visit Summary   10/27/2017    Merlin J Koppendrayer    MRN: [id number]           Patient Information     Date Of Birth          1940        Visit Information        Provider Department      10/27/2017 8:00 AM Michael Sterling PA-C [facility]        Today's Diagnoses     Screening for diabetic peripheral neuropathy    -  1    Triceps strain, initial encounter        Acute right-sided low back pain with right-sided sciatica        Type 2 diabetes mellitus without complication, without long-term current use of insulin (H)           Follow-ups after your visit        Your next 10 appointments already scheduled     Nov 01, 2017   Procedure with Chapin Rodriguez MD   [facility] ([facility])    [address] [place] [id number] [phone]              Who to contact     If you have questions or need follow up information about today's clinic visit or your schedule please contact [facility] directly at [phone].  Normal or non-critical lab and imaging results will be communicated to you by Bluestem Brandshart, letter or phone within 4 business days after the clinic has received the results. If you do not hear from us within 7 days, please contact the clinic through MiTiot or phone. If you have a critical or abnormal lab result, we will notify you by phone as soon as possible.  Submit refill requests through BeatSwitch or call your pharmacy and they will forward the refill request to us. Please allow 3 business days for your refill to be completed.          Additional Information About Your Visit        MyChart Information     BeatSwitch gives you secure access to your electronic health record. If you see a primary care provider, you can also send messages to your care team and make appointments. If you have questions, please call your primary care clinic.  If you do not have a primary care provider, please call [phone] and they  "will assist you.        Care EveryWhere ID     This is your Care EveryWhere ID. This could be used by other organizations to access your [facility] medical records  [id number]        Your Vitals Were     Pulse Temperature Respirations Height BMI (Body Mass Index)       60 97.8  F (36.6  C) (Temporal) 16 5' 11.25\" (1.81 m) 24.97 kg/m2        Blood Pressure from Last 3 Encounters:   10/27/17 136/80   02/10/17 130/62   01/23/17 126/64    Weight from Last 3 Encounters:   10/27/17 180 lb 4.8 oz (81.8 kg)   02/10/17 176 lb (79.8 kg)   01/23/17 179 lb (81.2 kg)              We Performed the Following     FOOT EXAM  NO CHARGE [50584.114]     TSH WITH FREE T4 REFLEX          Today's Medication Changes          These changes are accurate as of: 10/27/17  8:10 AM.  If you have any questions, ask your nurse or doctor.               Start taking these medicines.        Dose/Directions    cyclobenzaprine 10 MG tablet   Commonly known as:  FLEXERIL   Used for:  Acute right-sided low back pain with right-sided sciatica, Triceps strain, initial encounter   Started by:  Michael Sterling PA-C        Dose:  10 mg   Take 1 tablet (10 mg) by mouth 3 times daily as needed for muscle spasms   Quantity:  30 tablet   Refills:  0       ibuprofen 800 MG tablet   Commonly known as:  ADVIL/MOTRIN   Used for:  Triceps strain, initial encounter, Acute right-sided low back pain with right-sided sciatica   Started by:  Michael Sterling PA-C        Dose:  800 mg   Take 1 tablet (800 mg) by mouth every 8 hours as needed for moderate pain   Quantity:  90 tablet   Refills:  1            Where to get your medicines      These medications were sent to [facility] [address] [place] [address] - [address]  [address] [facility] [address]     Phone:  [phone]     ibuprofen 800 MG tablet         Some of these will need a paper prescription and others can be bought over the counter.  Ask your nurse if you have questions.     Bring a paper prescription " for each of these medications     cyclobenzaprine 10 MG tablet                Primary Care Provider Office Phone # Fax #    Zaid Oneill [phone]       [facility] [address] [place] [address]        Equal Access to Services     KIMBERLI RICE : Hadii aad ku hadasho Soomaali, waaxda luqadaha, qaybta kaalmada adeegyada, waxay idiin hayaan adetristin khmellosh laDejajoaquín ortega. So [facility] [phone].    ATENCIÓN: Si habla español, tiene a otoole disposición servicios gratuitos de asistencia lingüística. Llame al [phone].    We comply with applicable federal civil rights laws and [place] laws. We do not discriminate on the basis of race, color, national origin, age, disability, sex, sexual orientation, or gender identity.            Thank you!     Thank you for choosing [facility]  for your care. Our goal is always to provide you with excellent care. Hearing back from our patients is one way we can continue to improve our services. Please take a few minutes to complete the written survey that you may receive in the mail after your visit with us. Thank you!             Your Updated Medication List - Protect others around you: Learn how to safely use, store and throw away your medicines at www.disposemymeds.org.          This list is accurate as of: 10/27/17  8:10 AM.  Always use your most recent med list.                   Brand Name Dispense Instructions for use Diagnosis    ACCU-CHEK SMARTVIEW test strip   Generic drug:  blood glucose monitoring     200 each    USE ONE STRIP TWICE DAILY TO  CHECK  BLOOD  SUGAR  VARYING  THE  TIMES    Type 2 diabetes mellitus without complication, unspecified long term insulin use status (H), Hyperlipidemia LDL goal <130       aMILoride-hydrochlorothiazide 5-50 MG Tabs per tablet    MODURETIC    90 tablet    TAKE 1 TABLET EVERY DAY    Essential hypertension with goal blood pressure less than 140/90       amLODIPine 5 MG tablet    NORVASC     90 tablet    TAKE 1 TABLET EVERY DAY    Essential hypertension with goal blood pressure less than 140/90       aspirin 81 MG tablet     100    1 tab po QD (Once per day)        atorvastatin 40 MG tablet    LIPITOR    90 tablet    Take 1 tablet (40 mg) by mouth daily    Hyperlipidemia LDL goal <130       blood glucose monitoring lancets     250 each    Use 1 needle 2-3 times    Hyperlipidemia LDL goal <130       cyclobenzaprine 10 MG tablet    FLEXERIL    30 tablet    Take 1 tablet (10 mg) by mouth 3 times daily as needed for muscle spasms    Acute right-sided low back pain with right-sided sciatica, Triceps strain, initial encounter       ibuprofen 800 MG tablet    ADVIL/MOTRIN    90 tablet    Take 1 tablet (800 mg) by mouth every 8 hours as needed for moderate pain    Triceps strain, initial encounter, Acute right-sided low back pain with right-sided sciatica       metFORMIN 500 MG tablet    GLUCOPHAGE    90 tablet    TAKE 1 TABLET  DAILY  WITH  DINNER    Type 2 diabetes mellitus without complication, unspecified long term insulin use status (H)       metoprolol 50 MG tablet    LOPRESSOR    270 tablet    TAKE 2 TABLETS IN THE MORNING  AND TAKE 1 TABLET EVERY NIGHT    Hypertension goal BP (blood pressure) < 140/90       omeprazole 20 MG CR capsule    priLOSEC    90 capsule    Take 1 capsule (20 mg) by mouth daily    Gastroesophageal reflux disease without esophagitis       potassium chloride SA 20 MEQ CR tablet    K-DUR/KLOR-CON M    270 tablet    TAKE 1 TABLET THREE TIMES DAILY    Essential hypertension with goal blood pressure less than 140/90, Hypopotassemia       TYLENOL 325 MG tablet   Generic drug:  acetaminophen      Take 325-650 mg by mouth every 6 hours as needed for mild pain

## 2017-11-01 ENCOUNTER — SURGERY (OUTPATIENT)
Age: 77
End: 2017-11-01

## 2017-11-01 ENCOUNTER — MEDICAL CORRESPONDENCE (OUTPATIENT)
Dept: HEALTH INFORMATION MANAGEMENT | Facility: CLINIC | Age: 77
End: 2017-11-01

## 2017-11-01 ENCOUNTER — HOSPITAL ENCOUNTER (OUTPATIENT)
Facility: CLINIC | Age: 77
Discharge: HOME | End: 2017-11-01
Attending: INTERNAL MEDICINE | Admitting: INTERNAL MEDICINE
Payer: MEDICARE

## 2017-11-01 VITALS
TEMPERATURE: 97.9 F | RESPIRATION RATE: 18 BRPM | DIASTOLIC BLOOD PRESSURE: 81 MMHG | HEART RATE: 83 BPM | OXYGEN SATURATION: 95 % | SYSTOLIC BLOOD PRESSURE: 138 MMHG

## 2017-11-01 LAB — COLONOSCOPY: NORMAL

## 2017-11-01 PROCEDURE — 40000296 ZZH STATISTIC ENDO RECOVERY CLASS 1:2 FIRST HOUR: Performed by: INTERNAL MEDICINE

## 2017-11-01 PROCEDURE — G0105 COLORECTAL SCRN; HI RISK IND: HCPCS | Performed by: INTERNAL MEDICINE

## 2017-11-01 PROCEDURE — 25000128 H RX IP 250 OP 636: Performed by: INTERNAL MEDICINE

## 2017-11-01 PROCEDURE — 45378 DIAGNOSTIC COLONOSCOPY: CPT | Performed by: INTERNAL MEDICINE

## 2017-11-01 RX ORDER — LIDOCAINE 40 MG/G
CREAM TOPICAL
Status: DISCONTINUED | OUTPATIENT
Start: 2017-11-01 | End: 2017-11-01 | Stop reason: HOSPADM

## 2017-11-01 RX ORDER — FENTANYL CITRATE 50 UG/ML
INJECTION, SOLUTION INTRAMUSCULAR; INTRAVENOUS PRN
Status: DISCONTINUED | OUTPATIENT
Start: 2017-11-01 | End: 2017-11-01 | Stop reason: HOSPADM

## 2017-11-01 RX ORDER — ONDANSETRON 2 MG/ML
4 INJECTION INTRAMUSCULAR; INTRAVENOUS
Status: DISCONTINUED | OUTPATIENT
Start: 2017-11-01 | End: 2017-11-01 | Stop reason: HOSPADM

## 2017-11-01 RX ADMIN — MIDAZOLAM HYDROCHLORIDE 1 MG: 1 INJECTION, SOLUTION INTRAMUSCULAR; INTRAVENOUS at 08:23

## 2017-11-01 RX ADMIN — MIDAZOLAM HYDROCHLORIDE 1 MG: 1 INJECTION, SOLUTION INTRAMUSCULAR; INTRAVENOUS at 08:26

## 2017-11-01 RX ADMIN — MIDAZOLAM HYDROCHLORIDE 2 MG: 1 INJECTION, SOLUTION INTRAMUSCULAR; INTRAVENOUS at 08:22

## 2017-11-01 RX ADMIN — FENTANYL CITRATE 25 MCG: 50 INJECTION, SOLUTION INTRAMUSCULAR; INTRAVENOUS at 08:31

## 2017-11-01 RX ADMIN — MIDAZOLAM HYDROCHLORIDE 1 MG: 1 INJECTION, SOLUTION INTRAMUSCULAR; INTRAVENOUS at 08:24

## 2017-11-01 RX ADMIN — FENTANYL CITRATE 50 MCG: 50 INJECTION, SOLUTION INTRAMUSCULAR; INTRAVENOUS at 08:22

## 2017-11-01 NOTE — IP AVS SNAPSHOT
MRN:[id number]                      After Visit Summary   11/1/2017    Merlin J Koppendrayer    MRN: [id number]           Thank you!     Thank you for choosing [facility] for your care. Our goal is always to provide you with excellent care. Hearing back from our patients is one way we can continue to improve our services. Please take a few minutes to complete the written survey that you may receive in the mail after you visit with us. Thank you!        Patient Information     Date Of Birth          1940        About your hospital stay     You were admitted on:  November 1, 2017 You last received care in the:  [facility]    You were discharged on:  November 1, 2017       Who to Call     For medical emergencies, please call 911.  For non-urgent questions about your medical care, please call your primary care provider or clinic, [phone]  For questions related to your surgery, please call your surgery clinic        Attending Provider     Provider Specialty    Chapin Rodriguez MD Gastroenterology       Primary Care Provider Office Phone # Fax #    Zaid Oneill [phone]      Pending Results     No orders found from 10/30/2017 to 11/2/2017.            Admission Information     Date & Time Provider Department Dept. Phone    11/1/2017 Chapin Rodriguez MD [facility] [phone]      Your Vitals Were     Blood Pressure Pulse Temperature Respirations Pulse Oximetry       133/72 83 97.9  F (36.6  C) (Oral) 16 96%       MyChart Information     MyChart gives you secure access to your electronic health record. If you see a primary care provider, you can also send messages to your care team and make appointments. If you have questions, please call your primary care clinic.  If you do not have a primary care provider, please call [phone] and they will assist you.        Care EveryWhere ID     This is your Care EveryWhere ID. This could be  used by other organizations to access your [facility] medical records  [id number]        Equal Access to Services     KENYHANY KRYSTAL : Hadii yancy Tidwell, rosalindda jimi, qabriseyda zavalarajeevshelia cloud, baldemar williammelloroxanne ortega. So [facility] [phone].    ATENCIÓN: Si habla español, tiene a otoole disposición servicios gratuitos de asistencia lingüística. Ines al [phone].    We comply with applicable federal civil rights laws and [place] laws. We do not discriminate on the basis of race, color, national origin, age, disability, sex, sexual orientation, or gender identity.               Review of your medicines      UNREVIEWED medicines. Ask your doctor about these medicines        Dose / Directions    cyclobenzaprine 10 MG tablet   Commonly known as:  FLEXERIL   Used for:  Acute right-sided low back pain with right-sided sciatica, Triceps strain, initial encounter        Dose:  10 mg   Take 1 tablet (10 mg) by mouth 3 times daily as needed for muscle spasms   Quantity:  30 tablet   Refills:  0         CONTINUE these medicines which have NOT CHANGED        Dose / Directions    ACCU-CHEK SMARTVIEW test strip   Used for:  Type 2 diabetes mellitus without complication, unspecified long term insulin use status (H), Hyperlipidemia LDL goal <130   Generic drug:  blood glucose monitoring        USE ONE STRIP TWICE DAILY TO  CHECK  BLOOD  SUGAR  VARYING  THE  TIMES   Quantity:  200 each   Refills:  1       aMILoride-hydrochlorothiazide 5-50 MG Tabs per tablet   Commonly known as:  MODURETIC   Used for:  Essential hypertension with goal blood pressure less than 140/90        TAKE 1 TABLET EVERY DAY   Quantity:  90 tablet   Refills:  3       amLODIPine 5 MG tablet   Commonly known as:  NORVASC   Used for:  Essential hypertension with goal blood pressure less than 140/90        TAKE 1 TABLET EVERY DAY   Quantity:  90 tablet   Refills:  2       aspirin 81 MG tablet        1 tab po QD (Once per day)    Quantity:  100   Refills:  3       atorvastatin 40 MG tablet   Commonly known as:  LIPITOR   Used for:  Hyperlipidemia LDL goal <130        Dose:  40 mg   Take 1 tablet (40 mg) by mouth daily   Quantity:  90 tablet   Refills:  3       blood glucose monitoring lancets   Used for:  Hyperlipidemia LDL goal <130        Use 1 needle 2-3 times   Quantity:  250 each   Refills:  3       ibuprofen 800 MG tablet   Commonly known as:  ADVIL/MOTRIN   Used for:  Triceps strain, initial encounter, Acute right-sided low back pain with right-sided sciatica        Dose:  800 mg   Take 1 tablet (800 mg) by mouth every 8 hours as needed for moderate pain   Quantity:  90 tablet   Refills:  1       metFORMIN 500 MG tablet   Commonly known as:  GLUCOPHAGE   Used for:  Type 2 diabetes mellitus without complication, unspecified long term insulin use status (H)        TAKE 1 TABLET  DAILY  WITH  DINNER   Quantity:  90 tablet   Refills:  2       metoprolol 50 MG tablet   Commonly known as:  LOPRESSOR   Used for:  Hypertension goal BP (blood pressure) < 140/90        TAKE 2 TABLETS IN THE MORNING  AND TAKE 1 TABLET EVERY NIGHT   Quantity:  270 tablet   Refills:  3       omeprazole 20 MG CR capsule   Commonly known as:  priLOSEC   Used for:  Gastroesophageal reflux disease without esophagitis        Dose:  20 mg   Take 1 capsule (20 mg) by mouth daily   Quantity:  90 capsule   Refills:  3       potassium chloride SA 20 MEQ CR tablet   Commonly known as:  K-DUR/KLOR-CON M   Used for:  Essential hypertension with goal blood pressure less than 140/90, Hypopotassemia        TAKE 1 TABLET THREE TIMES DAILY   Quantity:  270 tablet   Refills:  3       TYLENOL 325 MG tablet   Generic drug:  acetaminophen        Dose:  325-650 mg   Take 325-650 mg by mouth every 6 hours as needed for mild pain   Refills:  0                Protect others around you: Learn how to safely use, store and throw away your medicines at www.disposemymeds.org.              Medication List: This is a list of all your medications and when to take them. Check marks below indicate your daily home schedule. Keep this list as a reference.      Medications           Morning Afternoon Evening Bedtime As Needed    ACCU-CHEK SMARTVIEW test strip   USE ONE STRIP TWICE DAILY TO  CHECK  BLOOD  SUGAR  VARYING  THE  TIMES   Generic drug:  blood glucose monitoring                                aMILoride-hydrochlorothiazide 5-50 MG Tabs per tablet   Commonly known as:  MODURETIC   TAKE 1 TABLET EVERY DAY                                amLODIPine 5 MG tablet   Commonly known as:  NORVASC   TAKE 1 TABLET EVERY DAY                                aspirin 81 MG tablet   1 tab po QD (Once per day)                                atorvastatin 40 MG tablet   Commonly known as:  LIPITOR   Take 1 tablet (40 mg) by mouth daily                                blood glucose monitoring lancets   Use 1 needle 2-3 times                                cyclobenzaprine 10 MG tablet   Commonly known as:  FLEXERIL   Take 1 tablet (10 mg) by mouth 3 times daily as needed for muscle spasms                                ibuprofen 800 MG tablet   Commonly known as:  ADVIL/MOTRIN   Take 1 tablet (800 mg) by mouth every 8 hours as needed for moderate pain                                metFORMIN 500 MG tablet   Commonly known as:  GLUCOPHAGE   TAKE 1 TABLET  DAILY  WITH  DINNER                                metoprolol 50 MG tablet   Commonly known as:  LOPRESSOR   TAKE 2 TABLETS IN THE MORNING  AND TAKE 1 TABLET EVERY NIGHT                                omeprazole 20 MG CR capsule   Commonly known as:  priLOSEC   Take 1 capsule (20 mg) by mouth daily                                potassium chloride SA 20 MEQ CR tablet   Commonly known as:  K-DUR/KLOR-CON M   TAKE 1 TABLET THREE TIMES DAILY                                TYLENOL 325 MG tablet   Take 325-650 mg by mouth every 6 hours as needed for mild pain   Generic drug:   acetaminophen

## 2017-11-01 NOTE — CONSULTS
[facility] GI Pre-Procedure Physical Assessment    Merlin J Koppendrayer MRN# [id number]   Age: 77 year old YOB: 1940      Date of Surgery: 11/1/2017  Location [facility]      Date of Exam 11/1/2017 Facility (Same day)       Home clinic: [facility]  Primary care provider: Zaid Oneill         Active problem list:   Patient Active Problem List   Diagnosis     Hearing loss     Inguinal hernia     Prostatitis     Pyelonephritis     HYPERLIPIDEMIA LDL GOAL <130     Advanced directives, counseling/discussion     MICHELLE (obstructive sleep apnea)     Anxiety attack     Type 2 diabetes mellitus without complications (H)     Gastroesophageal reflux disease without esophagitis     Essential hypertension with goal blood pressure less than 140/90     Acute right-sided low back pain with right-sided sciatica     Triceps strain, initial encounter            Medications (include herbals and vitamins):   Any Plavix use in the last 7 days?  No     Current Facility-Administered Medications   Medication     lidocaine 1 % 1 mL     lidocaine (LMX4) kit     sodium chloride (PF) 0.9% PF flush 3 mL     sodium chloride (PF) 0.9% PF flush 3 mL     sodium chloride (PF) 0.9% PF flush 3 mL     ondansetron (ZOFRAN) injection 4 mg             Allergies:      Allergies   Allergen Reactions     Sulfa Drugs      rash     Allergy to Latex?  No  Allergy to tape?    No          Social History:     Social History   Substance Use Topics     Smoking status: Former Smoker     Quit date: 1/1/1974     Smokeless tobacco: Never Used      Comment: Quit 1974      Alcohol use 8.4 oz/week     14 Standard drinks or equivalent per week            Physical Exam:   All vitals have been reviewed  Blood pressure 136/81, pulse 83, temperature 97.9  F (36.6  C), temperature source Oral, SpO2 95 %.  Airway assessment:   Patient is able to open mouth wide  Patient is able to stick out tongue      Lungs:   No increased  work of breathing, good air exchange, clear to auscultation bilaterally, no crackles or wheezing      Cardiovascular:   Normal apical impulse, regular rate and rhythm, normal S1 and S2, no S3 or S4, and no murmur noted           Lab / Radiology Results:   All laboratory data reviewed          Assessment:   Appropriately NPO  Chief complaint or anatomic assessment of involved area: FHx colon cancer         Plan:   Moderate (conscious) sedation     Patient's active problems diagnostically and therapeutically optimized for the planned procedure  Risks, benefits, alternatives to sedation and blood explained and consent obtained  Risks, benefits, alternatives to procedure explained and consent obtained  Orders and progress notes are in the chart  Discharge from Phase 1 and / or Phase 2 recovery when patient meets criteria    I have reviewed the history and physical, lab finding(s), diagnostic data, medicaitons, and the plan for sedation.  I have determined this patient to be an appropriate candidate for the planned sedation / procedure and have reassessed the patient immediately prior to sedation / procedure.    I have personally and medically directed the administration of medications used.    Chapin Rodriguez MD

## 2017-11-13 DIAGNOSIS — G47.33 OSA (OBSTRUCTIVE SLEEP APNEA): Primary | ICD-10-CM

## 2017-12-20 DIAGNOSIS — E11.9 TYPE 2 DIABETES MELLITUS WITHOUT COMPLICATION, UNSPECIFIED LONG TERM INSULIN USE STATUS: ICD-10-CM

## 2017-12-20 DIAGNOSIS — E78.5 HYPERLIPIDEMIA LDL GOAL <130: ICD-10-CM

## 2017-12-20 NOTE — TELEPHONE ENCOUNTER
Requested Prescriptions   Pending Prescriptions Disp Refills     ACCU-CHEK SMARTVIEW test strip [Pharmacy Med Name: ACCU-CHEK SMART     UBALDO] 100 strip 3     Sig: USE ONE STRIP TO CHECK GLUCOSE TWICE DAILY (VARY  THE  TIME  OF  DAY)    Diabetic Supplies Protocol Failed    12/20/2017  2:54 PM       Failed - Patient has had appt within past 6 mos    IV to PO - Antibiotics     None                   Passed - Patient is 18 years of age or older

## 2017-12-21 RX ORDER — BLOOD SUGAR DIAGNOSTIC
STRIP MISCELLANEOUS
Qty: 100 STRIP | Refills: 3 | Status: SHIPPED | OUTPATIENT
Start: 2017-12-21 | End: 2019-01-23

## 2018-02-15 DIAGNOSIS — K21.9 GASTROESOPHAGEAL REFLUX DISEASE WITHOUT ESOPHAGITIS: ICD-10-CM

## 2018-02-15 DIAGNOSIS — I10 ESSENTIAL HYPERTENSION WITH GOAL BLOOD PRESSURE LESS THAN 140/90: ICD-10-CM

## 2018-02-15 NOTE — TELEPHONE ENCOUNTER
"Requested Prescriptions   Pending Prescriptions Disp Refills     omeprazole (PRILOSEC) 20 MG CR capsule 90 capsule 3     Sig: Take 1 capsule (20 mg) by mouth daily    PPI Protocol Failed    2/15/2018  1:13 PM       Failed - Recent or future visit with authorizing provider's specialty    Patient had office visit in the last year or has a visit in the next 30 days with authorizing provider.  See \"Patient Info\" tab in inbasket, or \"Choose Columns\" in Meds & Orders section of the refill encounter.            Passed - Not on Clopidogrel (unless Pantoprazole ordered)       Passed - No diagnosis of osteoporosis on record       Passed - Patient is age 18 or older        amLODIPine (NORVASC) 5 MG tablet 90 tablet 2     Sig: TAKE 1 TABLET EVERY DAY    Calcium Channel Blockers Protocol  Failed    2/15/2018  1:13 PM       Failed - Recent or future visit with authorizing provider    Patient had office visit in the last year or has a visit in the next 30 days with authorizing provider.  See \"Patient Info\" tab in inbasket, or \"Choose Columns\" in Meds & Orders section of the refill encounter.            Failed - Normal serum creatinine on file in past 12 months    Recent Labs   Lab Test  01/23/17   0844   CR  1.09            Passed - Blood pressure under 140/90 in past 12 months    BP Readings from Last 3 Encounters:   11/01/17 138/81   10/27/17 136/80   02/10/17 130/62                Passed - Patient is age 18 or older          Last Written Prescription Date:  3/10/17, 1/23/17  Last Fill Quantity: 90,  # refills: 2, 3   Last Office Visit with G, P or [facility] prescribing provider:  10/27/17   Future Office Visit:       "

## 2018-02-15 NOTE — TELEPHONE ENCOUNTER
Reason for Call:  Medication or medication refill:    Do you use a [facility]?  Name of the pharmacy and phone number for the current request:  [facility]    Name of the medication requested: omeprazole (PRILOSEC) 20 MG CR capsule, amLODIPine (NORVASC) 5 MG tablet,potassium chloride SA     Other request: patient states that [facility] will no longer take Rx's on line, so needs provider to send these over     Can we leave a detailed message on this number? YES    Phone number patient can be reached at: Home number on file [phone] (home)    Best Time: any    Call taken on 2/15/2018 at 1:08 PM by Rocio Nicholson

## 2018-02-19 RX ORDER — AMLODIPINE BESYLATE 5 MG/1
TABLET ORAL
Qty: 90 TABLET | Refills: 2 | Status: SHIPPED | OUTPATIENT
Start: 2018-02-19 | End: 2018-09-08

## 2018-03-05 DIAGNOSIS — E87.6 HYPOPOTASSEMIA: ICD-10-CM

## 2018-03-05 DIAGNOSIS — I10 ESSENTIAL HYPERTENSION WITH GOAL BLOOD PRESSURE LESS THAN 140/90: ICD-10-CM

## 2018-03-06 NOTE — TELEPHONE ENCOUNTER
"Requested Prescriptions   Pending Prescriptions Disp Refills     aMILoride-hydrochlorothiazide (MODURETIC) 5-50 MG TABS per tablet [Pharmacy Med Name: AMILORIDE/HYDROCHLOROTHIAZIDE 5-50 MG Tablet] 90 tablet 3     Sig: TAKE 1 TABLET EVERY DAY    Diuretics (Including Combos) Protocol Failed    3/5/2018  9:40 AM       Failed - Recent (12 mo) or future (30 days) visit within the authorizing provider's specialty    Patient had office visit in the last year or has a visit in the next 30 days with authorizing provider.  See \"Patient Info\" tab in inbasket, or \"Choose Columns\" in Meds & Orders section of the refill encounter.            Failed - Normal serum creatinine on file in past 12 months    Recent Labs   Lab Test  01/23/17   0844   CR  1.09             Failed - Normal serum potassium on file in past 12 months    Recent Labs   Lab Test  01/23/17   0844   POTASSIUM  3.8                   Failed - Normal serum sodium on file in past 12 months    Recent Labs   Lab Test  01/23/17   0844   NA  141             Passed - Blood pressure under 140/90 in past 12 months    BP Readings from Last 3 Encounters:   11/01/17 138/81   10/27/17 136/80   02/10/17 130/62                Passed - Patient is age 18 or older        potassium chloride SA (K-DUR/KLOR-CON M) 20 MEQ CR tablet [Pharmacy Med Name: POTASSIUM CHLORIDE ER 20 MEQ Tablet Extended Release] 270 tablet 3     Sig: TAKE 1 TABLET THREE TIMES DAILY    Potassium Supplements Protocol Failed    3/5/2018  9:40 AM       Failed - Recent (12 mo) or future (30 days) visit within the authorizing provider's specialty    Patient had office visit in the last year or has a visit in the next 30 days with authorizing provider.  See \"Patient Info\" tab in inbasket, or \"Choose Columns\" in Meds & Orders section of the refill encounter.            Failed - Normal serum potassium in past 12 months    Recent Labs   Lab Test  01/23/17   0844   POTASSIUM  3.8                   Passed - Patient is age " 18 or older          Last Written Prescription Date:  1/23/17, 1/3/17  Last Fill Quantity: 90, 270,  # refills: 3   Last Office Visit with [facility], [facility] or [facility] prescribing provider:  10/27/17   Future Office Visit:

## 2018-03-08 RX ORDER — POTASSIUM CHLORIDE 1500 MG/1
TABLET, EXTENDED RELEASE ORAL
Qty: 270 TABLET | Refills: 0 | Status: SHIPPED | OUTPATIENT
Start: 2018-03-08 | End: 2018-06-08

## 2018-03-08 RX ORDER — AMILORIDE HYDROCHLORIDE AND HYDROCHLOROTHIAZIDE 5; 50 MG/1; MG/1
TABLET ORAL
Qty: 90 TABLET | Refills: 0 | Status: SHIPPED | OUTPATIENT
Start: 2018-03-08 | End: 2018-06-08

## 2018-03-08 NOTE — TELEPHONE ENCOUNTER
Routing refill requests to provider for review/approval because:  High Drug interaction warning for use of Moduetic with K-Dur: Cardiac arrhythmias or arrest.......................NYLA Reyna

## 2018-03-21 ENCOUNTER — TELEPHONE (OUTPATIENT)
Dept: INTERNAL MEDICINE | Facility: CLINIC | Age: 78
End: 2018-03-21

## 2018-03-21 DIAGNOSIS — E11.9 TYPE 2 DIABETES MELLITUS WITHOUT COMPLICATION, WITHOUT LONG-TERM CURRENT USE OF INSULIN (H): Primary | ICD-10-CM

## 2018-03-21 NOTE — TELEPHONE ENCOUNTER
Reason for Call: Request for an order or referral:    Order or referral being requested: Patient is calling stating that it shows on mychart that he is due for some lab work. I do not see orders. Please place orders and contact patient to get this scheduled.     Date needed: as soon as possible    Has the patient been seen by the PCP for this problem? YES    Additional comments:     Phone number Patient can be reached at:  Home number on file [phone] (home)    Best Time:  any    Can we leave a detailed message on this number?  YES    Call taken on 3/21/2018 at 12:28 PM by Nadia Alvarado

## 2018-03-29 DIAGNOSIS — E11.9 TYPE 2 DIABETES MELLITUS WITHOUT COMPLICATION, WITHOUT LONG-TERM CURRENT USE OF INSULIN (H): ICD-10-CM

## 2018-03-29 LAB
ALBUMIN SERPL-MCNC: 4.1 G/DL (ref 3.4–5)
ALP SERPL-CCNC: 53 U/L (ref 40–150)
ALT SERPL W P-5'-P-CCNC: 50 U/L (ref 0–70)
ANION GAP SERPL CALCULATED.3IONS-SCNC: 8 MMOL/L (ref 3–14)
AST SERPL W P-5'-P-CCNC: 21 U/L (ref 0–45)
BILIRUB SERPL-MCNC: 0.5 MG/DL (ref 0.2–1.3)
BUN SERPL-MCNC: 19 MG/DL (ref 7–30)
CALCIUM SERPL-MCNC: 9 MG/DL (ref 8.5–10.1)
CHLORIDE SERPL-SCNC: 100 MMOL/L (ref 94–109)
CHOLEST SERPL-MCNC: 108 MG/DL
CO2 SERPL-SCNC: 32 MMOL/L (ref 20–32)
CREAT SERPL-MCNC: 0.97 MG/DL (ref 0.66–1.25)
CREAT UR-MCNC: 129 MG/DL
GFR SERPL CREATININE-BSD FRML MDRD: 76 ML/MIN/1.7M2
GLUCOSE SERPL-MCNC: 156 MG/DL (ref 70–99)
HBA1C MFR BLD: 7.7 % (ref 0–6.4)
HDLC SERPL-MCNC: 34 MG/DL
LDLC SERPL CALC-MCNC: 54 MG/DL
MICROALBUMIN UR-MCNC: <5 MG/L
MICROALBUMIN/CREAT UR: NORMAL MG/G CR (ref 0–17)
NONHDLC SERPL-MCNC: 74 MG/DL
POTASSIUM SERPL-SCNC: 3.8 MMOL/L (ref 3.4–5.3)
PROT SERPL-MCNC: 7.3 G/DL (ref 6.8–8.8)
SODIUM SERPL-SCNC: 140 MMOL/L (ref 133–144)
TRIGL SERPL-MCNC: 99 MG/DL

## 2018-03-29 PROCEDURE — 36415 COLL VENOUS BLD VENIPUNCTURE: CPT | Performed by: INTERNAL MEDICINE

## 2018-03-29 PROCEDURE — 82043 UR ALBUMIN QUANTITATIVE: CPT | Performed by: INTERNAL MEDICINE

## 2018-03-29 PROCEDURE — 80053 COMPREHEN METABOLIC PANEL: CPT | Performed by: INTERNAL MEDICINE

## 2018-03-29 PROCEDURE — 83036 HEMOGLOBIN GLYCOSYLATED A1C: CPT | Performed by: INTERNAL MEDICINE

## 2018-03-29 PROCEDURE — 80061 LIPID PANEL: CPT | Performed by: INTERNAL MEDICINE

## 2018-04-26 DIAGNOSIS — E78.5 HYPERLIPIDEMIA LDL GOAL <130: ICD-10-CM

## 2018-04-26 RX ORDER — ATORVASTATIN CALCIUM 40 MG/1
TABLET, FILM COATED ORAL
Qty: 90 TABLET | Refills: 1 | Status: SHIPPED | OUTPATIENT
Start: 2018-04-26 | End: 2018-09-21

## 2018-04-26 NOTE — TELEPHONE ENCOUNTER
"Requested Prescriptions   Pending Prescriptions Disp Refills     atorvastatin (LIPITOR) 40 MG tablet [Pharmacy Med Name: ATORVASTATIN CALCIUM 40 MG Tablet] 90 tablet 3     Sig: TAKE 1 TABLET EVERY DAY    Statins Protocol Failed    4/26/2018  8:08 AM       Failed - Recent (12 mo) or future (30 days) visit within the authorizing provider's specialty    Patient had office visit in the last 12 months or has a visit in the next 30 days with authorizing provider or within the authorizing provider's specialty.  See \"Patient Info\" tab in inbasket, or \"Choose Columns\" in Meds & Orders section of the refill encounter.           Passed - LDL on file in past 12 months    Recent Labs   Lab Test  03/29/18   0757   LDL  54            Passed - No abnormal creatine kinase in past 12 months    Recent Labs   Lab Test  07/28/16   1611   CKT  57               Passed - Patient is age 18 or older          Last Written Prescription Date:  1/23/17  Last Fill Quantity: 90,  # refills: 3   Last Office Visit with G, P or [facility] prescribing provider:  10/27/17   Future Office Visit:       "

## 2018-05-22 ENCOUNTER — OFFICE VISIT (OUTPATIENT)
Dept: INTERNAL MEDICINE | Facility: CLINIC | Age: 78
End: 2018-05-22
Payer: COMMERCIAL

## 2018-05-22 VITALS
HEART RATE: 60 BPM | DIASTOLIC BLOOD PRESSURE: 80 MMHG | TEMPERATURE: 97.6 F | WEIGHT: 182.4 LBS | SYSTOLIC BLOOD PRESSURE: 132 MMHG | OXYGEN SATURATION: 97 % | BODY MASS INDEX: 25.26 KG/M2

## 2018-05-22 DIAGNOSIS — L82.0 INFLAMED SEBORRHEIC KERATOSIS: Primary | ICD-10-CM

## 2018-05-22 PROCEDURE — 17110 DESTRUCTION B9 LES UP TO 14: CPT | Performed by: INTERNAL MEDICINE

## 2018-05-22 ASSESSMENT — PAIN SCALES - GENERAL: PAINLEVEL: MODERATE PAIN (5)

## 2018-05-22 NOTE — PROGRESS NOTES
SUBJECTIVE:   Merlin J Koppendrayer is a 77 year old male who presents to clinic today for the following health issues:    Chief Complaint   Patient presents with     Mole     right side of face near hair line     Right forehead has a lesion that is irritated on the hairline.    Past Medical History:   Diagnosis Date     Diabetes (H)      Diabetic eye exam (H) 12/17/14     Esophageal reflux      Hypersomnia with sleep apnea, unspecified      Inguinal hernia without mention of obstruction or gangrene, unilateral or unspecified, (not specified as recurrent)     s/p repair     Mixed hyperlipidemia      Other isolated or specific phobias     claustrophobia     Prostatitis, unspecified      Pyelonephritis, unspecified      Unspecified essential hypertension      Unspecified hearing loss      Current Outpatient Prescriptions   Medication     ACCU-CHEK FASTCLIX LANCETS MISC     ACCU-CHEK SMARTVIEW test strip     acetaminophen (TYLENOL) 325 MG tablet     aMILoride-hydrochlorothiazide (MODURETIC) 5-50 MG TABS per tablet     amLODIPine (NORVASC) 5 MG tablet     ASPIRIN 81 MG OR TABS     atorvastatin (LIPITOR) 40 MG tablet     cyclobenzaprine (FLEXERIL) 10 MG tablet     ibuprofen (ADVIL/MOTRIN) 800 MG tablet     metFORMIN (GLUCOPHAGE) 500 MG tablet     metoprolol tartrate (LOPRESSOR) 50 MG tablet     omeprazole (PRILOSEC) 20 MG CR capsule     potassium chloride SA (K-DUR/KLOR-CON M) 20 MEQ CR tablet     No current facility-administered medications for this visit.      Physical Exam  /80  Pulse 60  Temp 97.6  F (36.4  C) (Tympanic)  Wt 182 lb 6.4 oz (82.7 kg)  SpO2 97%  BMI 25.26 kg/m2  General Appearance-healthy, alert, no distress  Right forehead has a 1.5 cm inflamed seborrheic kerratosis.    ASSESSMENT:  Inflamed seb keratosis, will treat with cryotherapy and monitor.    Procedure Note  Verbal consent  Treated with liquid nitrogen 3 rounds of 3 seconds each,   Aftercare instructions given to  patient.    Electronically signed by Zaid Oneill MD

## 2018-05-22 NOTE — MR AVS SNAPSHOT
After Visit Summary   5/22/2018    Merlin J Koppendrayer    MRN: [id number]           Patient Information     Date Of Birth          1940        Visit Information        Provider Department      5/22/2018 2:45 PM Zaid Oneill MD [facility]         Follow-ups after your visit        Who to contact     If you have questions or need follow up information about today's clinic visit or your schedule please contact [facility] directly at [phone].  Normal or non-critical lab and imaging results will be communicated to you by MyChart, letter or phone within 4 business days after the clinic has received the results. If you do not hear from us within 7 days, please contact the clinic through [employer] or phone. If you have a critical or abnormal lab result, we will notify you by phone as soon as possible.  Submit refill requests through Shmoop or call your pharmacy and they will forward the refill request to us. Please allow 3 business days for your refill to be completed.          Additional Information About Your Visit        MyChart Information     [employer] gives you secure access to your electronic health record. If you see a primary care provider, you can also send messages to your care team and make appointments. If you have questions, please call your primary care clinic.  If you do not have a primary care provider, please call [phone] and they will assist you.        Care EveryWhere ID     This is your Care EveryWhere ID. This could be used by other organizations to access your [place] medical records  [id number]        Your Vitals Were     Pulse Temperature Pulse Oximetry BMI (Body Mass Index)          60 97.6  F (36.4  C) (Tympanic) 97% 25.26 kg/m2         Blood Pressure from Last 3 Encounters:   05/22/18 132/80   11/01/17 138/81   10/27/17 136/80    Weight from Last 3 Encounters:   05/22/18 182 lb 6.4 oz (82.7 kg)   10/27/17 180 lb 4.8 oz (81.8 kg)    02/10/17 176 lb (79.8 kg)              Today, you had the following     No orders found for display       Primary Care Provider Office Phone # Fax #    Zaid Oneill [phone] [address] [facility] [address]        Equal Access to Services     KENYHANY KRYSTAL : Hadii aad ku hadasho Soomaali, waaxda luqadaha, qaybta kaalmada adeegyada, waxay idiin hayaan adeeg aleyda lamirna . So [facility] [place] [phone].    ATENCIÓN: Si habla español, tiene a otoole disposición servicios gratuitos de asistencia lingüística. Llame al [phone].    We comply with applicable federal civil rights laws and [place] laws. We do not discriminate on the basis of race, color, national origin, age, disability, sex, sexual orientation, or gender identity.            Thank you!     Thank you for choosing [facility]  for your care. Our goal is always to provide you with excellent care. Hearing back from our patients is one way we can continue to improve our services. Please take a few minutes to complete the written survey that you may receive in the mail after your visit with us. Thank you!             Your Updated Medication List - Protect others around you: Learn how to safely use, store and throw away your medicines at www.disposemymeds.org.          This list is accurate as of 5/22/18  2:51 PM.  Always use your most recent med list.                   Brand Name Dispense Instructions for use Diagnosis    ACCU-CHEK SMARTVIEW test strip   Generic drug:  blood glucose monitoring     100 strip    USE ONE STRIP TO CHECK GLUCOSE TWICE DAILY (VARY  THE  TIME  OF  DAY)    Type 2 diabetes mellitus without complication, unspecified long term insulin use status (H), Hyperlipidemia LDL goal <130       aMILoride-hydrochlorothiazide 5-50 MG Tabs per tablet    MODURETIC    90 tablet    TAKE 1 TABLET EVERY DAY    Essential hypertension with goal blood pressure less than 140/90       amLODIPine 5 MG tablet     NORVASC    90 tablet    TAKE 1 TABLET EVERY DAY    Essential hypertension with goal blood pressure less than 140/90       aspirin 81 MG tablet     100    1 tab po QD (Once per day)        atorvastatin 40 MG tablet    LIPITOR    90 tablet    TAKE 1 TABLET EVERY DAY    Hyperlipidemia LDL goal <130       blood glucose monitoring lancets     250 each    Use 1 needle 2-3 times    Hyperlipidemia LDL goal <130       cyclobenzaprine 10 MG tablet    FLEXERIL    30 tablet    Take 1 tablet (10 mg) by mouth 3 times daily as needed for muscle spasms    Acute right-sided low back pain with right-sided sciatica, Triceps strain, initial encounter       ibuprofen 800 MG tablet    ADVIL/MOTRIN    90 tablet    Take 1 tablet (800 mg) by mouth every 8 hours as needed for moderate pain    Triceps strain, initial encounter, Acute right-sided low back pain with right-sided sciatica       metFORMIN 500 MG tablet    GLUCOPHAGE    90 tablet    TAKE 1 TABLET  DAILY  WITH  DINNER    Type 2 diabetes mellitus without complication, unspecified long term insulin use status (H)       metoprolol tartrate 50 MG tablet    LOPRESSOR    270 tablet    TAKE 2 TABLETS IN THE MORNING  AND TAKE 1 TABLET EVERY NIGHT    Hypertension goal BP (blood pressure) < 140/90       omeprazole 20 MG CR capsule    priLOSEC    90 capsule    Take 1 capsule (20 mg) by mouth daily    Gastroesophageal reflux disease without esophagitis       potassium chloride SA 20 MEQ CR tablet    K-DUR/KLOR-CON M    270 tablet    TAKE 1 TABLET THREE TIMES DAILY    Essential hypertension with goal blood pressure less than 140/90, Hypopotassemia       TYLENOL 325 MG tablet   Generic drug:  acetaminophen      Take 325-650 mg by mouth every 6 hours as needed for mild pain

## 2018-05-29 ENCOUNTER — OFFICE VISIT (OUTPATIENT)
Dept: INTERNAL MEDICINE | Facility: CLINIC | Age: 78
End: 2018-05-29
Payer: COMMERCIAL

## 2018-05-29 ENCOUNTER — TELEPHONE (OUTPATIENT)
Dept: INTERNAL MEDICINE | Facility: CLINIC | Age: 78
End: 2018-05-29

## 2018-05-29 VITALS
SYSTOLIC BLOOD PRESSURE: 128 MMHG | BODY MASS INDEX: 25.21 KG/M2 | DIASTOLIC BLOOD PRESSURE: 66 MMHG | TEMPERATURE: 97.1 F | OXYGEN SATURATION: 99 % | WEIGHT: 182 LBS | RESPIRATION RATE: 16 BRPM | HEART RATE: 56 BPM

## 2018-05-29 DIAGNOSIS — M62.830 BACK MUSCLE SPASM: ICD-10-CM

## 2018-05-29 DIAGNOSIS — E11.9 TYPE 2 DIABETES MELLITUS WITHOUT COMPLICATION, WITHOUT LONG-TERM CURRENT USE OF INSULIN (H): ICD-10-CM

## 2018-05-29 DIAGNOSIS — R10.9 RIGHT FLANK PAIN: Primary | ICD-10-CM

## 2018-05-29 PROCEDURE — 99213 OFFICE O/P EST LOW 20 MIN: CPT | Performed by: INTERNAL MEDICINE

## 2018-05-29 RX ORDER — PREDNISONE 20 MG/1
40 TABLET ORAL DAILY
Qty: 10 TABLET | Refills: 0 | Status: SHIPPED | OUTPATIENT
Start: 2018-05-29 | End: 2019-02-12

## 2018-05-29 ASSESSMENT — PAIN SCALES - GENERAL: PAINLEVEL: MODERATE PAIN (4)

## 2018-05-29 NOTE — MR AVS SNAPSHOT
After Visit Summary   5/29/2018    Merlin J Koppendrayer    MRN: [id number]           Patient Information     Date Of Birth          1940        Visit Information        Provider Department      5/29/2018 12:00 PM Zaid Oneill MD [facility]         Follow-ups after your visit        Who to contact     If you have questions or need follow up information about today's clinic visit or your schedule please contact [facility] directly at [phone].  Normal or non-critical lab and imaging results will be communicated to you by MyChart, letter or phone within 4 business days after the clinic has received the results. If you do not hear from us within 7 days, please contact the clinic through [employer] or phone. If you have a critical or abnormal lab result, we will notify you by phone as soon as possible.  Submit refill requests through Pieceable or call your pharmacy and they will forward the refill request to us. Please allow 3 business days for your refill to be completed.          Additional Information About Your Visit        MyChart Information     [employer] gives you secure access to your electronic health record. If you see a primary care provider, you can also send messages to your care team and make appointments. If you have questions, please call your primary care clinic.  If you do not have a primary care provider, please call [phone] and they will assist you.        Care EveryWhere ID     This is your Care EveryWhere ID. This could be used by other organizations to access your [place] medical records  [id number]        Your Vitals Were     Pulse Temperature Respirations Pulse Oximetry BMI (Body Mass Index)       56 97.1  F (36.2  C) (Temporal) 16 99% 25.21 kg/m2        Blood Pressure from Last 3 Encounters:   05/29/18 128/66   05/22/18 132/80   11/01/17 138/81    Weight from Last 3 Encounters:   05/29/18 182 lb (82.6 kg)   05/22/18 182 lb 6.4 oz (82.7  kg)   10/27/17 180 lb 4.8 oz (81.8 kg)              Today, you had the following     No orders found for display       Primary Care Provider Office Phone # Fax #    Zaid Oneill [phone] [address] [facility] [address]        Equal Access to Services     KNEYHANY KRYSTAL : Hadii aad ku hadasho Soomaali, waaxda luqadaha, qaybta kaalmada adeegyada, waxay idiin hayaan adeeg aleyda lamirna ortega. So [facility] [phone].    ATENCIÓN: Si habla español, tiene a otoole disposición servicios gratuitos de asistencia lingüística. Llame al [phone].    We comply with applicable federal civil rights laws and [place] laws. We do not discriminate on the basis of race, color, national origin, age, disability, sex, sexual orientation, or gender identity.            Thank you!     Thank you for choosing [facility]  for your care. Our goal is always to provide you with excellent care. Hearing back from our patients is one way we can continue to improve our services. Please take a few minutes to complete the written survey that you may receive in the mail after your visit with us. Thank you!             Your Updated Medication List - Protect others around you: Learn how to safely use, store and throw away your medicines at www.disposemymeds.org.          This list is accurate as of 5/29/18 12:10 PM.  Always use your most recent med list.                   Brand Name Dispense Instructions for use Diagnosis    ACCU-CHEK SMARTVIEW test strip   Generic drug:  blood glucose monitoring     100 strip    USE ONE STRIP TO CHECK GLUCOSE TWICE DAILY (VARY  THE  TIME  OF  DAY)    Type 2 diabetes mellitus without complication, unspecified long term insulin use status (H), Hyperlipidemia LDL goal <130       aMILoride-hydrochlorothiazide 5-50 MG Tabs per tablet    MODURETIC    90 tablet    TAKE 1 TABLET EVERY DAY    Essential hypertension with goal blood pressure less than 140/90       amLODIPine 5 MG  tablet    NORVASC    90 tablet    TAKE 1 TABLET EVERY DAY    Essential hypertension with goal blood pressure less than 140/90       aspirin 81 MG tablet     100    1 tab po QD (Once per day)        atorvastatin 40 MG tablet    LIPITOR    90 tablet    TAKE 1 TABLET EVERY DAY    Hyperlipidemia LDL goal <130       blood glucose monitoring lancets     250 each    Use 1 needle 2-3 times    Hyperlipidemia LDL goal <130       ibuprofen 800 MG tablet    ADVIL/MOTRIN    90 tablet    Take 1 tablet (800 mg) by mouth every 8 hours as needed for moderate pain    Triceps strain, initial encounter, Acute right-sided low back pain with right-sided sciatica       metFORMIN 500 MG tablet    GLUCOPHAGE    90 tablet    TAKE 1 TABLET  DAILY  WITH  DINNER    Type 2 diabetes mellitus without complication, unspecified long term insulin use status (H)       metoprolol tartrate 50 MG tablet    LOPRESSOR    270 tablet    TAKE 2 TABLETS IN THE MORNING  AND TAKE 1 TABLET EVERY NIGHT    Hypertension goal BP (blood pressure) < 140/90       omeprazole 20 MG CR capsule    priLOSEC    90 capsule    Take 1 capsule (20 mg) by mouth daily    Gastroesophageal reflux disease without esophagitis       potassium chloride SA 20 MEQ CR tablet    K-DUR/KLOR-CON M    270 tablet    TAKE 1 TABLET THREE TIMES DAILY    Essential hypertension with goal blood pressure less than 140/90, Hypopotassemia       TYLENOL 325 MG tablet   Generic drug:  acetaminophen      Take 325-650 mg by mouth every 6 hours as needed for mild pain

## 2018-05-29 NOTE — PROGRESS NOTES
SUBJECTIVE:   Merlin J Koppendrayer is a 77 year old male who presents to clinic today for the following health issues:      Chief Complaint   Patient presents with     Back Pain     low back pain for about six weeks, hard time bending - been to chiropractor x4     Back has been tender for 6 weeks, maybe from cutting some logs, pain when bending over forward.  Pain is on the right side, out 4 inches.  Sometimes can't stand straigthen up, has to go slowly. Nothing down his legs.  Left is ok. Walking and other movements are ok. AT night is tender, has to find correct position.      Past Medical History:   Diagnosis Date     Diabetes (H)      Diabetic eye exam (H) 12/17/14     Esophageal reflux      Hypersomnia with sleep apnea, unspecified      Inguinal hernia without mention of obstruction or gangrene, unilateral or unspecified, (not specified as recurrent)     s/p repair     Mixed hyperlipidemia      Other isolated or specific phobias     claustrophobia     Prostatitis, unspecified      Pyelonephritis, unspecified      Unspecified essential hypertension      Unspecified hearing loss      Current Outpatient Prescriptions   Medication     acetaminophen (TYLENOL) 325 MG tablet     aMILoride-hydrochlorothiazide (MODURETIC) 5-50 MG TABS per tablet     amLODIPine (NORVASC) 5 MG tablet     ASPIRIN 81 MG OR TABS     atorvastatin (LIPITOR) 40 MG tablet     ibuprofen (ADVIL/MOTRIN) 800 MG tablet     metFORMIN (GLUCOPHAGE) 500 MG tablet     metoprolol tartrate (LOPRESSOR) 50 MG tablet     omeprazole (PRILOSEC) 20 MG CR capsule     potassium chloride SA (K-DUR/KLOR-CON M) 20 MEQ CR tablet     ACCU-CHEK FASTCLIX LANCETS MIS     ACCU-CHEK SMARTVIEW test strip     No current facility-administered medications for this visit.      Physical Exam  /66  Pulse 56  Temp 97.1  F (36.2  C) (Temporal)  Resp 16  Wt 182 lb (82.6 kg)  SpO2 99%  BMI 25.21 kg/m2  General Appearance-healthy, alert, no distress back  examination-spine has no tenderness, mild tenderness  On the right flank at the level of T10 and T12, negative straight leg raise, mild muscle tenderness.    ASSESSMENT:  Right sided flank pain and back muscle spasm and strain.  He is already done ibuprofen, TENS unit, chiropractor and massage therapy.  We will treat him with oral prednisone despite his diabetes, he can do 40 mg a day for 5 days and he can take increase metformin to a day.  He will continue to limit his activity and hopefully will improve, if not better we will then have to do some other imaging such as a probable abdominal CT.    Electronically signed by Zaid Oneill MD

## 2018-05-29 NOTE — TELEPHONE ENCOUNTER
Reason for Call:  Same Day Appointment, Requested Provider:  Zaid Oneill MD    PCP: Zaid Oneill    Reason for visit: Wife states patient has been having lower back pain & spasms te last 1.5 wks, tried massage & chiropractor 4 times and not helping asking to see PCP today    Duration of symptoms: 1.5 wks    Have you been treated for this in the past? No    Additional comments:     Can we leave a detailed message on this number? YES    Phone number patient can be reached at: Home number on file [phone] (home)    Best Time:     Call taken on 5/29/2018 at 7:15 AM by Julieta Gutierrez

## 2018-06-08 DIAGNOSIS — I10 ESSENTIAL HYPERTENSION WITH GOAL BLOOD PRESSURE LESS THAN 140/90: ICD-10-CM

## 2018-06-08 DIAGNOSIS — E87.6 HYPOPOTASSEMIA: ICD-10-CM

## 2018-06-08 RX ORDER — POTASSIUM CHLORIDE 1500 MG/1
TABLET, EXTENDED RELEASE ORAL
Qty: 270 TABLET | Refills: 0 | Status: SHIPPED | OUTPATIENT
Start: 2018-06-08 | End: 2018-09-10

## 2018-06-08 RX ORDER — AMILORIDE HYDROCHLORIDE AND HYDROCHLOROTHIAZIDE 5; 50 MG/1; MG/1
TABLET ORAL
Qty: 90 TABLET | Refills: 0 | Status: SHIPPED | OUTPATIENT
Start: 2018-06-08 | End: 2018-09-21

## 2018-06-08 NOTE — TELEPHONE ENCOUNTER
"Requested Prescriptions   Pending Prescriptions Disp Refills     Potassium Chloride ER 20 MEQ TBCR [Pharmacy Med Name: POTASSIUM CHLORIDE ER 20 MEQ Tablet Extended Release] 270 tablet 0     Sig: TAKE 1 TABLET THREE TIMES DAILY (DUE FOR LAB CHECK BEFORE NEXT REFILL)    Potassium Supplements Protocol Passed    6/8/2018 10:07 AM       Passed - Recent (12 mo) or future (30 days) visit within the authorizing provider's specialty    Patient had office visit in the last 12 months or has a visit in the next 30 days with authorizing provider or within the authorizing provider's specialty.  See \"Patient Info\" tab in inbasket, or \"Choose Columns\" in Meds & Orders section of the refill encounter.           Passed - Patient is age 18 or older       Passed - Normal serum potassium in past 12 months    Recent Labs   Lab Test  03/29/18   0757   POTASSIUM  3.8                    aMILoride-hydrochlorothiazide (MODURETIC) 5-50 MG TABS per tablet [Pharmacy Med Name: AMILORIDE/HYDROCHLOROTHIAZIDE 5-50 MG Tablet] 90 tablet 0     Sig: TAKE 1 TABLET EVERY DAY (DUE FOR LAB CHECK BEFORE NEXT REFILL)    Diuretics (Including Combos) Protocol Passed    6/8/2018 10:07 AM       Passed - Blood pressure under 140/90 in past 12 months    BP Readings from Last 3 Encounters:   05/29/18 128/66   05/22/18 132/80   11/01/17 138/81                Passed - Recent (12 mo) or future (30 days) visit within the authorizing provider's specialty    Patient had office visit in the last 12 months or has a visit in the next 30 days with authorizing provider or within the authorizing provider's specialty.  See \"Patient Info\" tab in inbasket, or \"Choose Columns\" in Meds & Orders section of the refill encounter.           Passed - Patient is age 18 or older       Passed - Normal serum creatinine on file in past 12 months    Recent Labs   Lab Test  03/29/18   0757   CR  0.97             Passed - Normal serum potassium on file in past 12 months    Recent Labs   Lab Test "  03/29/18   0757   POTASSIUM  3.8                   Passed - Normal serum sodium on file in past 12 months    Recent Labs   Lab Test  03/29/18   0757   NA  140                Last Written Prescription Date:  3/8/17  Last Fill Quantity: 270,  # refills: 0   Last Office Visit with G, P or [facility] prescribing provider:  5/29/18   Future Office Visit:       Amloride-hydrochlorothiazide       Last Written Prescription Date:  3/8/18  Last Fill Quantity: 90,   # refills: 0  Last Office Visit: 5/29/18  Future Office visit:

## 2018-07-17 ENCOUNTER — TRANSFERRED RECORDS (OUTPATIENT)
Dept: HEALTH INFORMATION MANAGEMENT | Facility: CLINIC | Age: 78
End: 2018-07-17

## 2018-09-04 DIAGNOSIS — E11.9 TYPE 2 DIABETES MELLITUS WITHOUT COMPLICATION, UNSPECIFIED LONG TERM INSULIN USE STATUS: ICD-10-CM

## 2018-09-04 NOTE — TELEPHONE ENCOUNTER
"Requested Prescriptions   Pending Prescriptions Disp Refills     metFORMIN (GLUCOPHAGE) 500 MG tablet 90 tablet 1    Last Written Prescription Date:  3/27/18  Last Fill Quantity: 90,  # refills: 1   Last office visit: 5/29/2018 with prescribing provider:  5/29/18   Future Office Visit:     Sig: TAKE 1 TABLET  DAILY  WITH  DINNER    Biguanide Agents Passed    9/4/2018  9:21 AM       Passed - Blood pressure less than 140/90 in past 6 months    BP Readings from Last 3 Encounters:   05/29/18 128/66   05/22/18 132/80   11/01/17 138/81                Passed - Patient has documented LDL within the past 12 mos.    Recent Labs   Lab Test  03/29/18   0757   LDL  54            Passed - Patient has had a Microalbumin in the past 15 mos.    Recent Labs   Lab Test  03/29/18   1044   MICROL  <5   UMALCR  Unable to calculate due to low value            Passed - Patient is age 10 or older       Passed - Patient has documented A1c within the specified period of time.    If HgbA1C is 8 or greater, it needs to be on file within the past 3 months.  If less than 8, must be on file within the past 6 months.     Recent Labs   Lab Test  03/29/18   0757   A1C  7.7*            Passed - Patient's CR is NOT>1.4 OR Patient's EGFR is NOT<45 within past 12 mos.    Recent Labs   Lab Test  03/29/18   0757   GFRESTIMATED  76   GFRESTBLACK  >90       Recent Labs   Lab Test  03/29/18   0757   CR  0.97            Passed - Patient does NOT have a diagnosis of CHF.       Passed - Recent (6 mo) or future (30 days) visit within the authorizing provider's specialty    Patient had office visit in the last 6 months or has a visit in the next 30 days with authorizing provider or within the authorizing provider's specialty.  See \"Patient Info\" tab in inbasket, or \"Choose Columns\" in Meds & Orders section of the refill encounter.              "

## 2018-09-06 NOTE — TELEPHONE ENCOUNTER
Patient calling to follow up on medication, stated he requested this 2 weeks ago, patient is out of medication as of yesterday please advise if a covering provider can approve today.  Thank you,  Julieta Gutierrez  Patient Representative

## 2018-09-06 NOTE — TELEPHONE ENCOUNTER
Rx refilled per RN protocol.  1 month - this was sent to [facility] as ,ail order will take too long to get.    Will forward to schedulers to schedule patient for OV - Diabetes.  Malathi Reyes RN

## 2018-09-08 DIAGNOSIS — I10 ESSENTIAL HYPERTENSION WITH GOAL BLOOD PRESSURE LESS THAN 140/90: ICD-10-CM

## 2018-09-10 DIAGNOSIS — E87.6 HYPOPOTASSEMIA: ICD-10-CM

## 2018-09-10 DIAGNOSIS — I10 ESSENTIAL HYPERTENSION WITH GOAL BLOOD PRESSURE LESS THAN 140/90: ICD-10-CM

## 2018-09-10 NOTE — TELEPHONE ENCOUNTER
"Requested Prescriptions   Pending Prescriptions Disp Refills     KLOR-CON 20 MEQ CR tablet [Pharmacy Med Name: KLOR-CON M20 20 MEQ Tablet Extended Release] 270 tablet 0    Last Written Prescription Date:  6/08/2018  Last Fill Quantity: 270,  # refills: 0   Last office visit: 5/29/2018 with prescribing provider:  Dr. Oneill   Future Office Visit:   Next 5 appointments (look out 90 days)     Sep 21, 2018  7:30 AM CDT   Office Visit with Zaid Oneill MD   [facility] ([address] [id number] [phone]                  Sig: TAKE 1 TABLET THREE TIMES DAILY (DUE FOR LAB CHECK BEFORE NEXT REFILL)    Potassium Supplements Protocol Passed    9/10/2018  1:54 PM       Passed - Recent (12 mo) or future (30 days) visit within the authorizing provider's specialty    Patient had office visit in the last 12 months or has a visit in the next 30 days with authorizing provider or within the authorizing provider's specialty.  See \"Patient Info\" tab in inbasket, or \"Choose Columns\" in Meds & Orders section of the refill encounter.           Passed - Patient is age 18 or older       Passed - Normal serum potassium in past 12 months    Recent Labs   Lab Test  03/29/18   0757   POTASSIUM  3.8                      "

## 2018-09-10 NOTE — TELEPHONE ENCOUNTER
"Requested Prescriptions   Pending Prescriptions Disp Refills     amLODIPine (NORVASC) 5 MG tablet [Pharmacy Med Name: AMLODIPINE BESYLATE 5 MG Tablet] 90 tablet 2    Last Written Prescription Date:  2/19/18  Last Fill Quantity: 90,  # refills: 2   Last office visit: 5/29/2018 with prescribing provider:  5/29/18   Future Office Visit:   Next 5 appointments (look out 90 days)     Sep 21, 2018  7:30 AM CDT   Office Visit with Zaid Oneill MD   [facility] ([address] [address]   [phone]                  Sig: TAKE 1 TABLET EVERY DAY    Calcium Channel Blockers Protocol  Passed    9/8/2018  8:45 AM       Passed - Blood pressure under 140/90 in past 12 months    BP Readings from Last 3 Encounters:   05/29/18 128/66   05/22/18 132/80   11/01/17 138/81                Passed - Recent (12 mo) or future (30 days) visit within the authorizing provider's specialty    Patient had office visit in the last 12 months or has a visit in the next 30 days with authorizing provider or within the authorizing provider's specialty.  See \"Patient Info\" tab in inbasket, or \"Choose Columns\" in Meds & Orders section of the refill encounter.           Passed - Patient is age 18 or older       Passed - Normal serum creatinine on file in past 12 months    Recent Labs   Lab Test  03/29/18   0757   CR  0.97               "

## 2018-09-11 RX ORDER — AMLODIPINE BESYLATE 5 MG/1
TABLET ORAL
Qty: 90 TABLET | Refills: 2 | Status: SHIPPED | OUTPATIENT
Start: 2018-09-11 | End: 2019-05-10

## 2018-09-11 NOTE — TELEPHONE ENCOUNTER
Prescription approved per [facility] Refill Protocol.    Simin Monsalve RN

## 2018-09-12 RX ORDER — POTASSIUM CHLORIDE 1500 MG/1
20 TABLET, EXTENDED RELEASE ORAL 3 TIMES DAILY
Qty: 270 TABLET | Refills: 1 | Status: SHIPPED | OUTPATIENT
Start: 2018-09-12 | End: 2019-03-07

## 2018-09-12 NOTE — TELEPHONE ENCOUNTER
Routing refill request to provider for review/approval because:  Drug interaction warning  Malathi Reyes, DANYELLN, RN

## 2018-09-21 ENCOUNTER — OFFICE VISIT (OUTPATIENT)
Dept: INTERNAL MEDICINE | Facility: CLINIC | Age: 78
End: 2018-09-21
Payer: COMMERCIAL

## 2018-09-21 VITALS
SYSTOLIC BLOOD PRESSURE: 126 MMHG | TEMPERATURE: 96.9 F | BODY MASS INDEX: 23.7 KG/M2 | RESPIRATION RATE: 16 BRPM | DIASTOLIC BLOOD PRESSURE: 68 MMHG | HEIGHT: 72 IN | OXYGEN SATURATION: 97 % | WEIGHT: 175 LBS | HEART RATE: 60 BPM

## 2018-09-21 DIAGNOSIS — I10 ESSENTIAL HYPERTENSION WITH GOAL BLOOD PRESSURE LESS THAN 140/90: ICD-10-CM

## 2018-09-21 DIAGNOSIS — Z23 NEED FOR PROPHYLACTIC VACCINATION AND INOCULATION AGAINST INFLUENZA: ICD-10-CM

## 2018-09-21 DIAGNOSIS — E11.9 TYPE 2 DIABETES MELLITUS WITHOUT COMPLICATION, UNSPECIFIED LONG TERM INSULIN USE STATUS: Primary | ICD-10-CM

## 2018-09-21 DIAGNOSIS — E78.5 HYPERLIPIDEMIA LDL GOAL <130: ICD-10-CM

## 2018-09-21 LAB — HBA1C MFR BLD: 7.1 % (ref 0–5.6)

## 2018-09-21 PROCEDURE — 83036 HEMOGLOBIN GLYCOSYLATED A1C: CPT | Performed by: INTERNAL MEDICINE

## 2018-09-21 PROCEDURE — G0008 ADMIN INFLUENZA VIRUS VAC: HCPCS | Performed by: INTERNAL MEDICINE

## 2018-09-21 PROCEDURE — 36415 COLL VENOUS BLD VENIPUNCTURE: CPT | Performed by: INTERNAL MEDICINE

## 2018-09-21 PROCEDURE — 99214 OFFICE O/P EST MOD 30 MIN: CPT | Performed by: INTERNAL MEDICINE

## 2018-09-21 PROCEDURE — 90662 IIV NO PRSV INCREASED AG IM: CPT | Performed by: INTERNAL MEDICINE

## 2018-09-21 RX ORDER — AMILORIDE HYDROCHLORIDE AND HYDROCHLOROTHIAZIDE 5; 50 MG/1; MG/1
TABLET ORAL
Qty: 90 TABLET | Refills: 3 | Status: SHIPPED | OUTPATIENT
Start: 2018-09-21 | End: 2019-01-23

## 2018-09-21 RX ORDER — ATORVASTATIN CALCIUM 40 MG/1
TABLET, FILM COATED ORAL
Qty: 90 TABLET | Refills: 3 | Status: SHIPPED | OUTPATIENT
Start: 2018-09-21 | End: 2019-10-29

## 2018-09-21 ASSESSMENT — PAIN SCALES - GENERAL: PAINLEVEL: NO PAIN (0)

## 2018-09-21 NOTE — MR AVS SNAPSHOT
"              After Visit Summary   9/21/2018    Merlin J Koppendrayer    MRN: [id number]           Patient Information     Date Of Birth          1940        Visit Information        Provider Department      9/21/2018 7:30 AM Zaid Oneill MD [facility]         Follow-ups after your visit        Who to contact     If you have questions or need follow up information about today's clinic visit or your schedule please contact [facility] directly at [phone].  Normal or non-critical lab and imaging results will be communicated to you by MyChart, letter or phone within 4 business days after the clinic has received the results. If you do not hear from us within 7 days, please contact the clinic through [employer] or phone. If you have a critical or abnormal lab result, we will notify you by phone as soon as possible.  Submit refill requests through [employer] or call your pharmacy and they will forward the refill request to us. Please allow 3 business days for your refill to be completed.          Additional Information About Your Visit        MyChart Information     [employer] gives you secure access to your electronic health record. If you see a primary care provider, you can also send messages to your care team and make appointments. If you have questions, please call your primary care clinic.  If you do not have a primary care provider, please call [phone] and they will assist you.        Care EveryWhere ID     This is your Care EveryWhere ID. This could be used by other organizations to access your [facility] medical records  [id number]        Your Vitals Were     Pulse Temperature Respirations Height Pulse Oximetry BMI (Body Mass Index)    60 96.9  F (36.1  C) (Temporal) 16 5' 11.5\" (1.816 m) 97% 24.07 kg/m2       Blood Pressure from Last 3 Encounters:   09/21/18 148/74   05/29/18 128/66   05/22/18 132/80    Weight from Last 3 Encounters:   09/21/18 175 lb (79.4 kg)   05/29/18 " 182 lb (82.6 kg)   05/22/18 182 lb 6.4 oz (82.7 kg)              Today, you had the following     No orders found for display       Primary Care Provider Office Phone # Fax #    Zaid Oneill [phone] [address] [facility] [address]        Equal Access to Services     KIMBERLI RICE : Hadii aad ku hadasho Soomaali, waaxda luqadaha, qaybta kaalmada adeegyada, waxay idiin hayaan adeeg kharash la'aan . So [facility] [phone].    ATENCIÓN: Si habla español, tiene a otoole disposición servicios gratuitos de asistencia lingüística. Llame al [phone].    We comply with applicable federal civil rights laws and [place] laws. We do not discriminate on the basis of race, color, national origin, age, disability, sex, sexual orientation, or gender identity.            Thank you!     Thank you for choosing [facility]  for your care. Our goal is always to provide you with excellent care. Hearing back from our patients is one way we can continue to improve our services. Please take a few minutes to complete the written survey that you may receive in the mail after your visit with us. Thank you!             Your Updated Medication List - Protect others around you: Learn how to safely use, store and throw away your medicines at www.disposemymeds.org.          This list is accurate as of 9/21/18  7:38 AM.  Always use your most recent med list.                   Brand Name Dispense Instructions for use Diagnosis    ACCU-CHEK SMARTVIEW test strip   Generic drug:  blood glucose monitoring     100 strip    USE ONE STRIP TO CHECK GLUCOSE TWICE DAILY (VARY  THE  TIME  OF  DAY)    Type 2 diabetes mellitus without complication, unspecified long term insulin use status (H), Hyperlipidemia LDL goal <130       aMILoride-hydrochlorothiazide 5-50 MG Tabs per tablet    MODURETIC    90 tablet    TAKE 1 TABLET EVERY DAY (DUE FOR LAB CHECK BEFORE NEXT REFILL)    Essential hypertension with goal blood  pressure less than 140/90       amLODIPine 5 MG tablet    NORVASC    90 tablet    TAKE 1 TABLET EVERY DAY    Essential hypertension with goal blood pressure less than 140/90       aspirin 81 MG tablet     100    1 tab po QD (Once per day)        atorvastatin 40 MG tablet    LIPITOR    90 tablet    TAKE 1 TABLET EVERY DAY    Hyperlipidemia LDL goal <130       blood glucose monitoring lancets     250 each    Use 1 needle 2-3 times    Hyperlipidemia LDL goal <130       metFORMIN 500 MG tablet    GLUCOPHAGE    30 tablet    TAKE 1 TABLET  DAILY  WITH  DINNER.  Appointment needed for additional refills.    Type 2 diabetes mellitus without complication, unspecified long term insulin use status (H)       metoprolol tartrate 50 MG tablet    LOPRESSOR    270 tablet    TAKE 2 TABLETS IN THE MORNING  AND TAKE 1 TABLET EVERY NIGHT    Hypertension goal BP (blood pressure) < 140/90       omeprazole 20 MG CR capsule    priLOSEC    90 capsule    Take 1 capsule (20 mg) by mouth daily    Gastroesophageal reflux disease without esophagitis       potassium chloride SA 20 MEQ CR tablet    KLOR-CON    270 tablet    Take 1 tablet (20 mEq) by mouth 3 times daily    Essential hypertension with goal blood pressure less than 140/90, Hypopotassemia       TYLENOL 325 MG tablet   Generic drug:  acetaminophen      Take 325-650 mg by mouth every 6 hours as needed for mild pain

## 2018-09-21 NOTE — PROGRESS NOTES
SUBJECTIVE:   Merlin J Koppendrayer is a 77 year old male who presents to clinic today for the following health issues:    Chief Complaint   Patient presents with     Recheck Medication     Diabetes  Htn  Lipids   doing ok, weight is down 7 pounds, eat less.    Checking sugars once a day in the morning, 115 to 135 normally, was higher with back injury. Now back at the gym.     Past Medical History:   Diagnosis Date     Diabetes (H)      Diabetic eye exam (H) 12/17/14     Esophageal reflux      Hypersomnia with sleep apnea, unspecified      Inguinal hernia without mention of obstruction or gangrene, unilateral or unspecified, (not specified as recurrent)     s/p repair     Mixed hyperlipidemia      Other isolated or specific phobias     claustrophobia     Prostatitis, unspecified      Pyelonephritis, unspecified      Unspecified essential hypertension      Unspecified hearing loss      Current Outpatient Prescriptions   Medication     ACCU-CHEK FASTCLIX LANCETS MISC     ACCU-CHEK SMARTVIEW test strip     acetaminophen (TYLENOL) 325 MG tablet     aMILoride-hydrochlorothiazide (MODURETIC) 5-50 MG TABS per tablet     amLODIPine (NORVASC) 5 MG tablet     atorvastatin (LIPITOR) 40 MG tablet     metFORMIN (GLUCOPHAGE) 500 MG tablet     metoprolol tartrate (LOPRESSOR) 50 MG tablet     omeprazole (PRILOSEC) 20 MG CR capsule     potassium chloride SA (KLOR-CON) 20 MEQ CR tablet     ASPIRIN 81 MG OR TABS     No current facility-administered medications for this visit.      Social History   Substance Use Topics     Smoking status: Former Smoker     Quit date: 1/1/1974     Smokeless tobacco: Never Used      Comment: Quit 1974      Alcohol use 8.4 oz/week     14 Standard drinks or equivalent per week     Review of Systems  Constitutional-No fevers, chills, or weight changes..  Cardiac-No chest pain or palpitations.  Respiratory-No cough, sob, or hemoptysis.  GI-No nausea, vomitting, diarrhea, constipation, or blood in the  "stool.  Musculoskeletal-No muscles aches or joint pains.    Physical Exam  /68  Pulse 60  Temp 96.9  F (36.1  C) (Temporal)  Resp 16  Ht 5' 11.5\" (1.816 m)  Wt 175 lb (79.4 kg)  SpO2 97%  BMI 24.07 kg/m2  General Appearance-healthy, alert, no distress  Cardiac-regular rate and rhythm  with normal S1, S2 ; no murmur, rub or gallops  Lungs-clear to auscultation  GI-Soft, nontender.  Normal bowel sounds.  No hepatosplenomegaly or abnormal masses  Extremities-no peripheral edema, peripheral pulses normal    ASSESSMENT:     77-year-old gentleman who has type 2 diabetes, hyperlipidemia, hypertension.  He is overall doing well, he is back to exercising.  His weight is down.  His sugars are better in the 115-135 range.  He is taking metformin which gives him a little bit of loose stools but otherwise as well.  We will check his A1c today as it has been high in the spring.  It should be better now.    Hypertension the patient is doing well he will continue on amiloride, metoprolol, amlodipine, he has no peripheral edema.  We did send new prescriptions for him.    Hyperlipidemia the patient is on atorvastatin, lipids were done in the spring we will refill his Lipitor for the next year.    Flu shot is given today.      Electronically signed by Zaid Oneill MD          "

## 2018-09-21 NOTE — NURSING NOTE
Prescription for metformin and Lipitor were faxed to the pharmacy [facility].  No further action is needed as of right now.     Patricia Sanabria, CMA

## 2018-09-21 NOTE — PROGRESS NOTES
Injectable Influenza Immunization Documentation    1.  Is the person to be vaccinated sick today?   No    2. Does the person to be vaccinated have an allergy to a component   of the vaccine?   No  Egg Allergy Algorithm Link    3. Has the person to be vaccinated ever had a serious reaction   to influenza vaccine in the past?   No    4. Has the person to be vaccinated ever had Guillain-Barré syndrome?   No    Form completed by Leda Richardson CMA  Prior to injection verified patient identity using patient's name and date of birth.  Due to injection administration, patient instructed to remain in clinic for 15 minutes  afterwards, and to report any adverse reaction to me immediately.

## 2018-10-10 DIAGNOSIS — E11.9 TYPE 2 DIABETES MELLITUS WITHOUT COMPLICATION (H): ICD-10-CM

## 2018-10-10 NOTE — TELEPHONE ENCOUNTER
"Last Written Prescription Date:  9/21/18  Last Fill Quantity: 90,  # refills: 3   Last office visit: 9/21/2018 with prescribing provider:  Zaid Oneill   Future Office Visit:      Requested Prescriptions   Pending Prescriptions Disp Refills     metFORMIN (GLUCOPHAGE) 500 MG tablet [Pharmacy Med Name: METFORMIN 500MG TAB] 30 tablet 0     Sig: TAKE 1 TABLET BY MOUTH ONCE DAILY WITH  DINNER  APPOINTMENT  NEEDED  FOR  FURTHER  REFILLS    Biguanide Agents Passed    10/10/2018  9:15 AM       Passed - Blood pressure less than 140/90 in past 6 months    BP Readings from Last 3 Encounters:   09/21/18 126/68   05/29/18 128/66   05/22/18 132/80                Passed - Patient has documented LDL within the past 12 mos.    Recent Labs   Lab Test  03/29/18   0757   LDL  54            Passed - Patient has had a Microalbumin in the past 15 mos.    Recent Labs   Lab Test  03/29/18   1044   MICROL  <5   UMALCR  Unable to calculate due to low value            Passed - Patient is age 10 or older       Passed - Patient has documented A1c within the specified period of time.    If HgbA1C is 8 or greater, it needs to be on file within the past 3 months.  If less than 8, must be on file within the past 6 months.     Recent Labs   Lab Test  09/21/18   0818   A1C  7.1*            Passed - Patient's CR is NOT>1.4 OR Patient's EGFR is NOT<45 within past 12 mos.    Recent Labs   Lab Test  03/29/18   0757   GFRESTIMATED  76   GFRESTBLACK  >90       Recent Labs   Lab Test  03/29/18   0757   CR  0.97            Passed - Patient does NOT have a diagnosis of CHF.       Passed - Recent (6 mo) or future (30 days) visit within the authorizing provider's specialty    Patient had office visit in the last 6 months or has a visit in the next 30 days with authorizing provider or within the authorizing provider's specialty.  See \"Patient Info\" tab in inbasket, or \"Choose Columns\" in Meds & Orders section of the refill encounter.            Prescription " approved per [facility] Refill Protocol.    Simin Monsalve RN

## 2018-11-06 ENCOUNTER — OFFICE VISIT (OUTPATIENT)
Dept: SLEEP MEDICINE | Facility: CLINIC | Age: 78
End: 2018-11-06
Payer: COMMERCIAL

## 2018-11-06 ENCOUNTER — DOCUMENTATION ONLY (OUTPATIENT)
Dept: SLEEP MEDICINE | Facility: CLINIC | Age: 78
End: 2018-11-06
Payer: COMMERCIAL

## 2018-11-06 VITALS
OXYGEN SATURATION: 98 % | SYSTOLIC BLOOD PRESSURE: 118 MMHG | DIASTOLIC BLOOD PRESSURE: 72 MMHG | HEART RATE: 55 BPM | HEIGHT: 72 IN | BODY MASS INDEX: 23.7 KG/M2 | WEIGHT: 175 LBS

## 2018-11-06 DIAGNOSIS — G47.33 OSA (OBSTRUCTIVE SLEEP APNEA): Primary | ICD-10-CM

## 2018-11-06 PROCEDURE — 99205 OFFICE O/P NEW HI 60 MIN: CPT | Performed by: INTERNAL MEDICINE

## 2018-11-06 NOTE — PROGRESS NOTES
Patient was offered choice of vendor and chose [facility].  Patient Merlin J Koppendrayer was set up at [place] on November 6, 2018. Patient received a [employer]med AirSense 10 Auto. Pressures were set at 8-15 cm H2O.   Patient s ramp is 5 cm H2O for Off and FLEX/EPR is EPR.  Patient received a [employer]med Mask name: quattro  Full Face mask Size Large, heated tubing and heated humidifier.  Patient is enrolled in the [employer] Program and does need to meet compliance. Patient has a follow up on 12/11/18 with Dr. Can.    Patricia Vegas

## 2018-11-06 NOTE — MR AVS SNAPSHOT
After Visit Summary   11/6/2018    Merlin J Koppendrayer    MRN: [id number]           Patient Information     Date Of Birth          1940        Visit Information        Provider Department      11/6/2018 11:00 AM Jason Can MD [facility]        Today's Diagnoses     MICHELLE (obstructive sleep apnea)    -  1       Follow-ups after your visit        Follow-up notes from your care team     Return in about 1 year (around 11/6/2019).      Your next 10 appointments already scheduled     Dec 11, 2018 11:00 AM CST   Return Sleep Patient with Jason Can MD   [facility] [place] ([facility] [place]    [address] [place] [id number] [phone]              Who to contact     If you have questions or need follow up information about today's clinic visit or your schedule please contact [facility] directly at [phone].  Normal or non-critical lab and imaging results will be communicated to you by MyChart, letter or phone within 4 business days after the clinic has received the results. If you do not hear from us within 7 days, please contact the clinic through Aviate[employer] or phone. If you have a critical or abnormal lab result, we will notify you by phone as soon as possible.  Submit refill requests through [employer] or call your pharmacy and they will forward the refill request to us. Please allow 3 business days for your refill to be completed.          Additional Information About Your Visit        MyChart Information     [employer] gives you secure access to your electronic health record. If you see a primary care provider, you can also send messages to your care team and make appointments. If you have questions, please call your primary care clinic.  If you do not have a primary care provider, please call [phone] and they will assist you.        Care EveryWhere ID     This is your Care EveryWhere ID. This  could be used by other organizations to access your [facility] medical records  [id number]        Your Vitals Were     Pulse Height Pulse Oximetry BMI (Body Mass Index)          55 1.829 m (6') 98% 23.73 kg/m2         Blood Pressure from Last 3 Encounters:   11/06/18 118/72   09/21/18 126/68   05/29/18 128/66    Weight from Last 3 Encounters:   11/06/18 79.4 kg (175 lb)   09/21/18 79.4 kg (175 lb)   05/29/18 82.6 kg (182 lb)              We Performed the Following     Comprehensive DME        Primary Care Provider Office Phone # Fax #    Zaid Oneill [phone]       [address] [address]        Equal Access to Services     KIMBERLI RICE : Hadii yancy leblanc hadasho Soomaali, waaxda luqadaha, qaybta kaalmada adeegyada, waxay wilverin hayjoaquín hartley . So [facility] [phone].    ATENCIÓN: Si habla español, tiene a otoole disposición servicios gratuitos de asistencia lingüística. [facility] [phone].    We comply with applicable federal civil rights laws and [place] laws. We do not discriminate on the basis of race, color, national origin, age, disability, sex, sexual orientation, or gender identity.            Thank you!     Thank you for choosing [facility]  for your care. Our goal is always to provide you with excellent care. Hearing back from our patients is one way we can continue to improve our services. Please take a few minutes to complete the written survey that you may receive in the mail after your visit with us. Thank you!             Your Updated Medication List - Protect others around you: Learn how to safely use, store and throw away your medicines at www.disposemymeds.org.          This list is accurate as of 11/6/18 11:59 PM.  Always use your most recent med list.                   Brand Name Dispense Instructions for use Diagnosis    ACCU-CHEK SMARTVIEW test strip   Generic drug:  blood glucose monitoring     100 strip    USE ONE STRIP TO  CHECK GLUCOSE TWICE DAILY (VARY  THE  TIME  OF  DAY)    Type 2 diabetes mellitus without complication, unspecified long term insulin use status, Hyperlipidemia LDL goal <130       aMILoride-hydrochlorothiazide 5-50 MG Tabs per tablet    MODURETIC    90 tablet    TAKE 1 TABLET EVERY DAY    Essential hypertension with goal blood pressure less than 140/90       amLODIPine 5 MG tablet    NORVASC    90 tablet    TAKE 1 TABLET EVERY DAY    Essential hypertension with goal blood pressure less than 140/90       aspirin 81 MG tablet     100    1 tab po QD (Once per day)        atorvastatin 40 MG tablet    LIPITOR    90 tablet    TAKE 1 TABLET EVERY DAY    Hyperlipidemia LDL goal <130       blood glucose monitoring lancets     250 each    Use 1 needle 2-3 times    Hyperlipidemia LDL goal <130       * metFORMIN 500 MG tablet    GLUCOPHAGE    90 tablet    TAKE 1 TABLET  DAILY  WITH  DINNER.  Appointment needed for additional refills.    Type 2 diabetes mellitus without complication, unspecified long term insulin use status       * metFORMIN 500 MG tablet    GLUCOPHAGE    30 tablet    TAKE 1 TABLET BY MOUTH ONCE DAILY WITH  DINNER  APPOINTMENT  NEEDED  FOR  FURTHER  REFILLS    Type 2 diabetes mellitus without complication (H)       metoprolol tartrate 50 MG tablet    LOPRESSOR    270 tablet    TAKE 2 TABLETS IN THE MORNING  AND TAKE 1 TABLET EVERY NIGHT    Hypertension goal BP (blood pressure) < 140/90       omeprazole 20 MG CR capsule    priLOSEC    90 capsule    Take 1 capsule (20 mg) by mouth daily    Gastroesophageal reflux disease without esophagitis       order for DME      Equipment ordered: RESMED Auto PAP Mask type: Full face  Settings: 8-15 cm h2o        potassium chloride SA 20 MEQ CR tablet    KLOR-CON    270 tablet    Take 1 tablet (20 mEq) by mouth 3 times daily    Essential hypertension with goal blood pressure less than 140/90, Hypopotassemia       TYLENOL 325 MG tablet   Generic drug:  acetaminophen      Take  325-650 mg by mouth every 6 hours as needed for mild pain        * Notice:  This list has 2 medication(s) that are the same as other medications prescribed for you. Read the directions carefully, and ask your doctor or other care provider to review them with you.

## 2018-11-07 ENCOUNTER — DOCUMENTATION ONLY (OUTPATIENT)
Dept: SLEEP MEDICINE | Facility: CLINIC | Age: 78
End: 2018-11-07
Payer: COMMERCIAL
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
## 2018-11-09 ENCOUNTER — DOCUMENTATION ONLY (OUTPATIENT)
Dept: SLEEP MEDICINE | Facility: CLINIC | Age: 78
End: 2018-11-09

## 2018-11-09 NOTE — PROGRESS NOTES
3 DAY STM VISIT    Patient contacted for 3 day STM visit  Message left for patient to return call     Device type: Auto-CPAP  PAP settings from order::  CPAP min 8 cm  H20       CPAP max 15 cm  H20  Mask type:    Full Face Mask     Device settings from machine      Min CPAP 8.0            Max CPAP 15.0      Assessment: Nightly usage over four hours.  Action plan: Pt to have f/u 14 day STM visit.  Patient has a follow up visit scheduled:   yes within 31-90 days of set up.

## 2018-11-12 NOTE — PROGRESS NOTES
Patient returned call.     Subjective measures: Pt LM stating  things are going well and has no issues or complaints.  Pt is benefiting from therapy. Patient meeting subjective benchmarks.     Action plan:pt to have 14 day [facility] visit.

## 2018-11-20 DIAGNOSIS — K21.9 GASTROESOPHAGEAL REFLUX DISEASE WITHOUT ESOPHAGITIS: ICD-10-CM

## 2018-11-21 NOTE — TELEPHONE ENCOUNTER
"Requested Prescriptions   Pending Prescriptions Disp Refills     omeprazole (PRILOSEC) 20 MG CR capsule [Pharmacy Med Name: OMEPRAZOLE 20 MG Capsule Delayed Release] 90 capsule 2    Last Written Prescription Date:  2/19/18  Last Fill Quantity: 90,  # refills: 2   Last office visit: 9/21/2018 with prescribing provider:  Nino   Future Office Visit:  None   Sig: TAKE 1 CAPSULE (20 MG) BY MOUTH DAILY    PPI Protocol Passed    11/20/2018  1:32 AM       Passed - Not on Clopidogrel (unless Pantoprazole ordered)       Passed - No diagnosis of osteoporosis on record       Passed - Recent (12 mo) or future (30 days) visit within the authorizing provider's specialty    Patient had office visit in the last 12 months or has a visit in the next 30 days with authorizing provider or within the authorizing provider's specialty.  See \"Patient Info\" tab in inbasket, or \"Choose Columns\" in Meds & Orders section of the refill encounter.             Passed - Patient is age 18 or older          Prescription approved per [facility] Refill Protocol.    Pretty Arcos R.N. Primary Care    "

## 2018-11-26 ENCOUNTER — DOCUMENTATION ONLY (OUTPATIENT)
Dept: SLEEP MEDICINE | Facility: CLINIC | Age: 78
End: 2018-11-26

## 2018-11-26 NOTE — PROGRESS NOTES
14 DAY STM VISIT-stm restart per provider      Subjective measures:   Pt states things are going well and has no issues or complaints.  Pt is benefiting from therapy.  He is working on leak and it is getting better.       Assessment: Pt not meeting objective benchmarks for leak Patient meeting subjective benchmarks.   Action plan: pt to have 30 day STM visit.    Device type: Auto-CPAP  PAP settings: CPAP min 8.0 cm  H20     CPAP max 15.0 cm  H20        95th% pressure 12.9 cm   Mask type:  Full Face Mask  Objective measures: 14 day rolling measures      Compliance  100 %      Leak  54.88 lpm  last  upload      AHI 6.21   last  upload      Average number of minutes 441     Average hours of usage 7.4          Objective measure goal  Compliance   Goal >70%  Leak   Goal < 24 lpm  AHI  Goal < 5  Usage  Goal >240

## 2018-12-11 ENCOUNTER — DOCUMENTATION ONLY (OUTPATIENT)
Dept: SLEEP MEDICINE | Facility: CLINIC | Age: 78
End: 2018-12-11
Payer: COMMERCIAL

## 2018-12-11 ENCOUNTER — OFFICE VISIT (OUTPATIENT)
Dept: SLEEP MEDICINE | Facility: CLINIC | Age: 78
End: 2018-12-11
Payer: COMMERCIAL

## 2018-12-11 VITALS
DIASTOLIC BLOOD PRESSURE: 61 MMHG | OXYGEN SATURATION: 97 % | SYSTOLIC BLOOD PRESSURE: 117 MMHG | HEIGHT: 72 IN | HEART RATE: 61 BPM | BODY MASS INDEX: 24.52 KG/M2 | WEIGHT: 181 LBS

## 2018-12-11 DIAGNOSIS — G47.33 OSA (OBSTRUCTIVE SLEEP APNEA): Primary | ICD-10-CM

## 2018-12-11 PROCEDURE — 99213 OFFICE O/P EST LOW 20 MIN: CPT | Performed by: INTERNAL MEDICINE

## 2018-12-11 ASSESSMENT — MIFFLIN-ST. JEOR: SCORE: 1589.01

## 2018-12-11 NOTE — PATIENT INSTRUCTIONS
Your Body mass index is 24.55 kg/m .  Weight management is a personal decision.  If you are interested in exploring weight loss strategies, the following discussion covers the approaches that may be successful. Body mass index (BMI) is one way to tell whether you are at a healthy weight, overweight, or obese. It measures your weight in relation to your height.  A BMI of 18.5 to 24.9 is in the healthy range. A person with a BMI of 25 to 29.9 is considered overweight, and someone with a BMI of 30 or greater is considered obese. More than two-thirds of [place] adults are considered overweight or obese.  Being overweight or obese increases the risk for further weight gain. Excess weight may lead to heart disease and diabetes.  Creating and following plans for healthy eating and physical activity may help you improve your health.  Weight control is part of healthy lifestyle and includes exercise, emotional health, and healthy eating habits. Careful eating habits lifelong are the mainstay of weight control. Though there are significant health benefits from weight loss, long-term weight loss with diet alone may be very difficult to achieve- studies show long-term success with dietary management in less than 10% of people. Attaining a healthy weight may be especially difficult to achieve in those with severe obesity. In some cases, medications, devices and surgical management might be considered.  What can you do?  If you are overweight or obese and are interested in methods for weight loss, you should discuss this with your provider.     Consider reducing daily calorie intake by 500 calories.     Keep a food journal.     Avoiding skipping meals, consider cutting portions instead.    Diet combined with exercise helps maintain muscle while optimizing fat loss. Strength training is particularly important for building and maintaining muscle mass. Exercise helps reduce stress, increase energy, and improves fitness.  Increasing exercise without diet control, however, may not burn enough calories to loose weight.       Start walking three days a week 10-20 minutes at a time    Work towards walking thirty minutes five days a week     Eventually, increase the speed of your walking for 1-2 minutes at time    In addition, we recommend that you review healthy lifestyles and methods for weight loss available through the National Institutes of Health patient information sites:  http://win.niddk.nih.gov/publications/index.htm    And look into health and wellness programs that may be available through your health insurance provider, employer, local community center, or [employer].    Weight management plan: Patient was referred to their PCP to discuss a diet and exercise plan.      Your Body mass index is 24.55 kg/m .  Weight management is a personal decision.  If you are interested in exploring weight loss strategies, the following discussion covers the approaches that may be successful. Body mass index (BMI) is one way to tell whether you are at a healthy weight, overweight, or obese. It measures your weight in relation to your height.  A BMI of 18.5 to 24.9 is in the healthy range. A person with a BMI of 25 to 29.9 is considered overweight, and someone with a BMI of 30 or greater is considered obese. More than two-thirds of [place] adults are considered overweight or obese.  Being overweight or obese increases the risk for further weight gain. Excess weight may lead to heart disease and diabetes.  Creating and following plans for healthy eating and physical activity may help you improve your health.  Weight control is part of healthy lifestyle and includes exercise, emotional health, and healthy eating habits. Careful eating habits lifelong are the mainstay of weight control. Though there are significant health benefits from weight loss, long-term weight loss with diet alone may be very difficult to achieve- studies show long-term  success with dietary management in less than 10% of people. Attaining a healthy weight may be especially difficult to achieve in those with severe obesity. In some cases, medications, devices and surgical management might be considered.  What can you do?  If you are overweight or obese and are interested in methods for weight loss, you should discuss this with your provider.     Consider reducing daily calorie intake by 500 calories.     Keep a food journal.     Avoiding skipping meals, consider cutting portions instead.    Diet combined with exercise helps maintain muscle while optimizing fat loss. Strength training is particularly important for building and maintaining muscle mass. Exercise helps reduce stress, increase energy, and improves fitness. Increasing exercise without diet control, however, may not burn enough calories to loose weight.       Start walking three days a week 10-20 minutes at a time    Work towards walking thirty minutes five days a week     Eventually, increase the speed of your walking for 1-2 minutes at time    In addition, we recommend that you review healthy lifestyles and methods for weight loss available through the National Institutes of Health patient information sites:  http://win.niddk.nih.gov/publications/index.htm    And look into health and wellness programs that may be available through your health insurance provider, employer, local community center, or [employer].

## 2018-12-12 ENCOUNTER — DOCUMENTATION ONLY (OUTPATIENT)
Dept: SLEEP MEDICINE | Facility: CLINIC | Age: 78
End: 2018-12-12

## 2018-12-12 DIAGNOSIS — I10 ESSENTIAL HYPERTENSION WITH GOAL BLOOD PRESSURE LESS THAN 140/90: ICD-10-CM

## 2018-12-12 NOTE — PROGRESS NOTES
SLEEP MEDICINE CLINIC NOTE   [facility]  Merlin J Koppendrayer 78 year old male  : 1940  MRN: [id number]      PRIMARY CARE PROVIDER: Zaid Oneill    Visit Date:   2018      HISTORY  REASON FOR VISIT:  Followup of obstructive sleep apnea.      HISTORY OF PRESENT ILLNESS:  The patient is a very pleasant 78-year-old gentleman with history of moderate obstructive sleep apnea, type 2 diabetes mellitus, hypertension, hyperlipidemia, GERD, anxiety, and hearing loss, who was last seen in our clinic approximately 1 month ago.  He had been using CPAP of 10 cm water with full face mask for treatment of moderate MICHELLE with an apnea-hypopnea index of 18.2 events per hour.  During his last clinic visit, he had suggested that his CPAP had malfunctioned and he requested for a new CPAP.  The patient was prescribed an auto-titrating CPAP with a range of 8-15 cm of water.  He comes in today to review his CPAP compliance.  He reports that overall, he thinks the machine is working really well.  He likes using the CPAP and has been 100% compliant with it.  He does use a full face mask and has noted that the mask is leaving nithya on the bridge of his nose.  He also has noticed that he invariably opens his mouth and has significant leak and oftentimes that wakes him up.  Besides this, no significant changes have occurred to his sleep or health in general since he was last seen in our clinic.  He goes to sleep around 9 p.m. It takes him just a few minutes to fall asleep.  He reports waking up twice through the night to use the bathroom and then finally wakes up spontaneously around 5:00 a.m.  He feels rested upon awakening.  He takes a short 10 15-minute naps while sitting in a couch in the afternoon, but does not generally feel excessively sleepy.      ASSESSMENT AND PLAN:  The patient's CPAP compliance over the last 30 days was reviewed. This showed 100% compliance with average daily use of 6  hours and 40 minutes.  His median pressure is 10.4 cm of water, 95th percentile pressure is 12.9 cm of water.  Median leak is 9.2 liters per minute, but 95th percentile leak is 50 liters per minute.  His total AHI is 6.5 with obstructive apnea index of 2.5 and central apnea index of 1.0 events per hour.      Overall, the patient has had significant improvement in his MICHELLE with the use of CPAP.  However, remnant AHI is slightly high.  This is likely due to large leak.      PLAN:  He will be referred to DME for further mask fitting and troubleshooting.  The patient was advised to continue using his CPAP every time he goes to sleep for as long as he sleeps including during naps if any.  He will return to clinic in approximately 1 year.  He was advised not to drive or operate heavy machinery if drowsy or sleepy.  He was advised to change the CPAP supplies every 3-6 months as indicated.         I spent a total of 25 minutes face to face with Merlin J Koppendrayer during today's office visit. Over 50% of this time was spent counseling the patient and/or coordinating care regarding their sleep disorder.     Jason Can MD   of Medicine  Pulmonary, Critical Care and Sleep Medicine  [facility]  Pager: [phone]            D: 2018   T: 2018   MT: NOLVIA      Name:     KOPPENDRAYER, MERLIN   MRN:      [id number]        Account:      [id number]   :      1940           Visit Date:   2018      Document: [id number]

## 2018-12-12 NOTE — PROGRESS NOTES
30 DAY STM VISIT    Data only recheck     Assessment: Pt meeting objective benchmarks.  Pt saw provider yesterday and things are going well  Action plan: pt to have 6 month [facility] visit  Patient had a follow up visit with Dr. Can on 12/11/18.   Device type: Auto-CPAP     PAP settings: CPAP min: 8.0  cm  H20     CPAP max: 15.0 cm  H20     95th% pressure: 12.9 cm  H20     Mask type: Full Face Mask    Objective measures: 14 day rolling measures      Compliance: 85%      Leak: 46.8 lpm  last  upload      AHI: 6.43   last  upload      Average number of minutes: 355        Objective measure goal  Compliance   Goal >70%  Leak   Goal < 24 lpm  AHI  Goal < 5  Usage  Goal >240

## 2018-12-13 RX ORDER — AMILORIDE HYDROCHLORIDE AND HYDROCHLOROTHIAZIDE 5; 50 MG/1; MG/1
TABLET ORAL
Qty: 90 TABLET | Refills: 0 | OUTPATIENT
Start: 2018-12-13

## 2018-12-14 NOTE — TELEPHONE ENCOUNTER
"Requested Prescriptions   Pending Prescriptions Disp Refills     aMILoride-hydrochlorothiazide (MODURETIC) 5-50 MG TABS per tablet [Pharmacy Med Name: AMILORIDE/HYDROCHLOROTHIAZIDE 5-50 MG Tablet] 90 tablet 0     Sig: TAKE 1 TABLET EVERY DAY (DUE FOR LAB CHECK BEFORE NEXT REFILL)    Diuretics (Including Combos) Protocol Passed - 12/12/2018  9:02 AM       Passed - Blood pressure under 140/90 in past 12 months    BP Readings from Last 3 Encounters:   12/11/18 117/61   11/06/18 118/72   09/21/18 126/68          Passed - Recent (12 mo) or future (30 days) visit within the authorizing provider's specialty    Patient had office visit in the last 12 months or has a visit in the next 30 days with authorizing provider or within the authorizing provider's specialty.  See \"Patient Info\" tab in inbasket, or \"Choose Columns\" in Meds & Orders section of the refill encounter.         Passed - Patient is age 18 or older       Passed - Normal serum creatinine on file in past 12 months    Recent Labs   Lab Test 03/29/18  0757   CR 0.97          Passed - Normal serum potassium on file in past 12 months    Recent Labs   Lab Test 03/29/18  0757   POTASSIUM 3.8          Passed - Normal serum sodium on file in past 12 months    Recent Labs   Lab Test 03/29/18  0757            Last OV 09/21/2018  Last filled 09/21/2018  Should have annual supply.    Higinio Campos, RN, BSN            "

## 2018-12-28 ENCOUNTER — DOCUMENTATION ONLY (OUTPATIENT)
Dept: SLEEP MEDICINE | Facility: CLINIC | Age: 78
End: 2018-12-28

## 2018-12-28 NOTE — PROGRESS NOTES
STM recheck          Data only recheck     Mask type:    Mask Interface: Full Face Mask       Assessment: Pt not meeting objective benchmarks for leak, however this is improving     Action plan: waiting for patient to return call.  and pt to have 6 month STM visit      Device type:   PAP Device: Auto-CPAP ()             Objective measure goal  Compliance   Goal >70%  Leak   Goal < 10% of night in large leak/ 24 lpm    AHI  Goal < 5  Usage  Goal >240

## 2019-01-23 DIAGNOSIS — E11.9 TYPE 2 DIABETES MELLITUS WITHOUT COMPLICATION (H): ICD-10-CM

## 2019-01-23 DIAGNOSIS — I10 ESSENTIAL HYPERTENSION WITH GOAL BLOOD PRESSURE LESS THAN 140/90: ICD-10-CM

## 2019-01-23 DIAGNOSIS — E78.5 HYPERLIPIDEMIA LDL GOAL <130: ICD-10-CM

## 2019-01-23 RX ORDER — AMILORIDE HYDROCHLORIDE AND HYDROCHLOROTHIAZIDE 5; 50 MG/1; MG/1
TABLET ORAL
Qty: 90 TABLET | Refills: 0 | Status: SHIPPED | OUTPATIENT
Start: 2019-01-23 | End: 2019-05-10

## 2019-01-23 NOTE — TELEPHONE ENCOUNTER
"Requested Prescriptions   Pending Prescriptions Disp Refills     aMILoride-hydrochlorothiazide (MODURETIC) 5-50 MG TABS per tablet [Pharmacy Med Name: AMILORIDE/HYDROCHLOROTHIAZIDE 5-50 MG Tablet] 90 tablet 0    Last Written Prescription Date:  9/21/18  Last Fill Quantity: 90,  # refills: 3   Last office visit: 9/21/2018 with prescribing provider:     Future Office Visit:     Sig: TAKE 1 TABLET EVERY DAY (DUE FOR LAB CHECK BEFORE NEXT REFILL)    Diuretics (Including Combos) Protocol Passed - 1/23/2019  8:22 AM       Passed - Blood pressure under 140/90 in past 12 months    BP Readings from Last 3 Encounters:   12/11/18 117/61   11/06/18 118/72   09/21/18 126/68          Passed - Recent (12 mo) or future (30 days) visit within the authorizing provider's specialty    Patient had office visit in the last 12 months or has a visit in the next 30 days with authorizing provider or within the authorizing provider's specialty.  See \"Patient Info\" tab in inbasket, or \"Choose Columns\" in Meds & Orders section of the refill encounter.           Passed - Medication is active on med list       Passed - Patient is age 18 or older       Passed - Normal serum creatinine on file in past 12 months    Recent Labs   Lab Test 03/29/18  0757   CR 0.97           Passed - Normal serum potassium on file in past 12 months    Recent Labs   Lab Test 03/29/18  0757   POTASSIUM 3.8           Passed - Normal serum sodium on file in past 12 months    Recent Labs   Lab Test 03/29/18  0757             CURRENT REFILLS SENT TO PHARMACY OF REQUEST CHANGE  Pretty Arcos RN    "

## 2019-01-24 RX ORDER — BLOOD SUGAR DIAGNOSTIC
STRIP MISCELLANEOUS
Qty: 100 STRIP | Refills: 3 | Status: SHIPPED | OUTPATIENT
Start: 2019-01-24 | End: 2020-03-13

## 2019-01-24 NOTE — TELEPHONE ENCOUNTER
"Requested Prescriptions   Pending Prescriptions Disp Refills     ACCU-CHEK SMARTVIEW test strip [Pharmacy Med Name: ACCU-CHEK SMART     UBALDO] 100 strip 3    Last Written Prescription Date:  12/21/17  Last Fill Quantity: 100,  # refills: 3   Last office visit: 9/21/2018 with prescribing provider:     Future Office Visit:     Sig: USE ONE STRIP TO CHECK GLUCOSE TWICE DAILY (VARY  THE  TIME  OF  DAY)    Diabetic Supplies Protocol Passed - 1/23/2019  4:06 PM       Passed - Medication is active on med list       Passed - Patient is 18 years of age or older       Passed - Recent (6 mo) or future (30 days) visit within the authorizing provider's specialty    Patient had office visit in the last 6 months or has a visit in the next 30 days with authorizing provider.  See \"Patient Info\" tab in inbasket, or \"Choose Columns\" in Meds & Orders section of the refill encounter.            Rx refilled per RN protocol.  JAROD Portillo, RN    "

## 2019-02-12 ENCOUNTER — OFFICE VISIT (OUTPATIENT)
Dept: INTERNAL MEDICINE | Facility: CLINIC | Age: 79
End: 2019-02-12
Payer: COMMERCIAL

## 2019-02-12 VITALS
RESPIRATION RATE: 16 BRPM | HEIGHT: 72 IN | OXYGEN SATURATION: 96 % | HEART RATE: 80 BPM | TEMPERATURE: 97.1 F | DIASTOLIC BLOOD PRESSURE: 84 MMHG | WEIGHT: 178 LBS | SYSTOLIC BLOOD PRESSURE: 136 MMHG | BODY MASS INDEX: 24.11 KG/M2

## 2019-02-12 DIAGNOSIS — H60.392 INFECTIVE OTITIS EXTERNA, LEFT: Primary | ICD-10-CM

## 2019-02-12 DIAGNOSIS — L98.9 SKIN LESION: ICD-10-CM

## 2019-02-12 PROCEDURE — 99213 OFFICE O/P EST LOW 20 MIN: CPT | Mod: 25 | Performed by: INTERNAL MEDICINE

## 2019-02-12 PROCEDURE — 11301 SHAVE SKIN LESION 0.6-1.0 CM: CPT | Performed by: INTERNAL MEDICINE

## 2019-02-12 PROCEDURE — 88305 TISSUE EXAM BY PATHOLOGIST: CPT | Mod: TC | Performed by: INTERNAL MEDICINE

## 2019-02-12 RX ORDER — NEOMYCIN SULFATE, POLYMYXIN B SULFATE, HYDROCORTISONE 3.5; 10000; 1 MG/ML; [USP'U]/ML; MG/ML
3 SOLUTION/ DROPS AURICULAR (OTIC) 4 TIMES DAILY
Qty: 5 ML | Refills: 0 | Status: SHIPPED | OUTPATIENT
Start: 2019-02-12 | End: 2019-05-17

## 2019-02-12 ASSESSMENT — MIFFLIN-ST. JEOR: SCORE: 1575.4

## 2019-02-12 ASSESSMENT — PAIN SCALES - GENERAL: PAINLEVEL: NO PAIN (0)

## 2019-02-12 NOTE — PROGRESS NOTES
Merlin J Koppendrayer is a 78 year old male who presents with concern over a mole, had a scab or rough spot on the right shoulder.    Ear was bothering him ,some clear drainage, left ear.  Better today.      Taking his medications, had a cold and sugars were going up with that. Up to 170 with cough drops.  Better today at 140 range.    Past Medical History:   Diagnosis Date     Diabetes (H)      Diabetic eye exam (H) 14     Esophageal reflux      Hypersomnia with sleep apnea, unspecified      Inguinal hernia without mention of obstruction or gangrene, unilateral or unspecified, (not specified as recurrent)     s/p repair     Mixed hyperlipidemia      Other isolated or specific phobias     claustrophobia     Prostatitis, unspecified      Pyelonephritis, unspecified      Unspecified essential hypertension      Unspecified hearing loss      Current Outpatient Medications   Medication     acetaminophen (TYLENOL) 325 MG tablet     aMILoride-hydrochlorothiazide (MODURETIC) 5-50 MG TABS per tablet     amLODIPine (NORVASC) 5 MG tablet     ASPIRIN 81 MG OR TABS     atorvastatin (LIPITOR) 40 MG tablet     Cholecalciferol (VITAMIN D PO)     metFORMIN (GLUCOPHAGE) 500 MG tablet     metoprolol tartrate (LOPRESSOR) 50 MG tablet     omeprazole (PRILOSEC) 20 MG CR capsule     order for DME     potassium chloride SA (KLOR-CON) 20 MEQ CR tablet     ACCU-CHEK FASTCLIX LANCETS MIS     ACCU-CHEK SMARTVIEW test strip     No current facility-administered medications for this visit.      Social History     Tobacco Use     Smoking status: Former Smoker     Last attempt to quit: 1974     Years since quittin.1     Smokeless tobacco: Never Used     Tobacco comment: Quit     Substance Use Topics     Alcohol use: Yes     Alcohol/week: 8.4 oz     Types: 14 Standard drinks or equivalent per week     Drug use: No     Physical Exam  /84 (BP Location: Left arm, Patient Position: Sitting, Cuff Size: Adult Regular)   Pulse 80    Temp 97.1  F (36.2  C) (Temporal)   Resp 16   Ht 1.829 m (6')   Wt 80.7 kg (178 lb)   SpO2 96%   BMI 24.14 kg/m     General Appearance-healthy, alert, no distress   Left ear has otitis externa, minimal drainage,  Right shoulder has a less than 1 cm mole which is a little bit irregular in shape almost causing L shape with slightly raised portions left      ASSESSMENT:  Otitis externa that is getting better but we will give him drops to help clear this up.  He should not wear his hearing aid for the next week as he takes the drops.    Right shoulder skin lesion appears to be a small possibly seborrheic keratoses but it is in an irregular shape, he has a history of melanoma.  Therefore I am going to do a shave biopsy to make sure this is benign.    Procedure note-shave biopsy of the right shoulder  Consent was obtained  The area was prepped with ChloraPrep and anesthetized with 1% lidocaine with epi  Using the shave device I did obtain to skin samples and sent off for pathology  Hemostasis with was done with pressure and aftercare instructions given to the patient.    Electronically signed by Zadi Oneill MD

## 2019-02-13 LAB — COPATH REPORT: NORMAL

## 2019-02-23 DIAGNOSIS — E11.9 TYPE 2 DIABETES MELLITUS WITHOUT COMPLICATION (H): ICD-10-CM

## 2019-02-25 NOTE — TELEPHONE ENCOUNTER
"metformin  Last Written Prescription Date:  09/21/2018  Last Fill Quantity: 90,  # refills: 3   Last office visit: 2/12/2019 with prescribing provider:      Future Office Visit:      Requested Prescriptions   Pending Prescriptions Disp Refills     metFORMIN (GLUCOPHAGE) 500 MG tablet [Pharmacy Med Name: METFORMIN HYDROCHLORIDE 500 MG Tablet] 90 tablet 1     Sig: TAKE 1 TABLET  DAILY  WITH  DINNER    Biguanide Agents Passed - 2/23/2019 11:17 AM       Passed - Blood pressure less than 140/90 in past 6 months    BP Readings from Last 3 Encounters:   02/12/19 136/84   12/11/18 117/61   11/06/18 118/72                Passed - Patient has documented LDL within the past 12 mos.    Recent Labs   Lab Test 03/29/18  0757   LDL 54            Passed - Patient has had a Microalbumin in the past 15 mos.    Recent Labs   Lab Test 03/29/18  1044   MICROL <5   UMALCR Unable to calculate due to low value            Passed - Patient is age 10 or older       Passed - Patient has documented A1c within the specified period of time.    If HgbA1C is 8 or greater, it needs to be on file within the past 3 months.  If less than 8, must be on file within the past 6 months.     Recent Labs   Lab Test 09/21/18  0818   A1C 7.1*            Passed - Patient's CR is NOT>1.4 OR Patient's EGFR is NOT<45 within past 12 mos.    Recent Labs   Lab Test 03/29/18  0757   GFRESTIMATED 76   GFRESTBLACK >90       Recent Labs   Lab Test 03/29/18  0757   CR 0.97            Passed - Patient does NOT have a diagnosis of CHF.       Passed - Medication is active on med list       Passed - Recent (6 mo) or future (30 days) visit within the authorizing provider's specialty    Patient had office visit in the last 6 months or has a visit in the next 30 days with authorizing provider or within the authorizing provider's specialty.  See \"Patient Info\" tab in inbasket, or \"Choose Columns\" in Meds & Orders section of the refill encounter.              Mail " order-approved.    Julianne Brown RN on 2/25/2019 at 2:08 PM

## 2019-03-07 DIAGNOSIS — I10 ESSENTIAL HYPERTENSION WITH GOAL BLOOD PRESSURE LESS THAN 140/90: ICD-10-CM

## 2019-03-07 DIAGNOSIS — E87.6 HYPOPOTASSEMIA: ICD-10-CM

## 2019-03-08 RX ORDER — POTASSIUM CHLORIDE 1500 MG/1
TABLET, EXTENDED RELEASE ORAL
Qty: 270 TABLET | Refills: 0 | Status: SHIPPED | OUTPATIENT
Start: 2019-03-08 | End: 2019-05-10

## 2019-03-08 NOTE — TELEPHONE ENCOUNTER
"Requested Prescriptions   Pending Prescriptions Disp Refills     KLOR-CON 20 MEQ CR tablet [Pharmacy Med Name: KLOR-CON M20 20 MEQ Tablet Extended Release]  Last Written Prescription Date:  9/12/18  Last Fill Quantity: 270,  # refills: 1   Last office visit: 10/27/2017 with prescribing provider:  Jesus Dyer   Future Office Visit:     270 tablet 0     Sig: TAKE 1 TABLET THREE TIMES DAILY (DUE FOR LAB CHECK BEFORE NEXT REFILL)    Potassium Supplements Protocol Passed - 3/7/2019 10:46 AM       Passed - Recent (12 mo) or future (30 days) visit within the authorizing provider's specialty    Patient had office visit in the last 12 months or has a visit in the next 30 days with authorizing provider or within the authorizing provider's specialty.  See \"Patient Info\" tab in inbasket, or \"Choose Columns\" in Meds & Orders section of the refill encounter.             Passed - Medication is active on med list       Passed - Patient is age 18 or older       Passed - Normal serum potassium in past 12 months    Recent Labs   Lab Test 03/29/18  0757   POTASSIUM 3.8                      "

## 2019-03-08 NOTE — TELEPHONE ENCOUNTER
Routing refill request to provider for review/approval because:  Drug interaction warning    DANYELL RubinN, RN  [facility]

## 2019-03-18 DIAGNOSIS — I10 ESSENTIAL HYPERTENSION WITH GOAL BLOOD PRESSURE LESS THAN 140/90: Primary | ICD-10-CM

## 2019-03-18 RX ORDER — POTASSIUM CHLORIDE 1500 MG/1
20 TABLET, EXTENDED RELEASE ORAL 3 TIMES DAILY
Qty: 90 TABLET | Refills: 0 | Status: SHIPPED | OUTPATIENT
Start: 2019-03-18 | End: 2019-12-20

## 2019-03-18 NOTE — TELEPHONE ENCOUNTER
Reason for Call:  Medication or medication refill:    Do you use a [facility]?  Name of the pharmacy and phone number for the current request:  [facility] [place] - [phone]    Name of the medication requested: potassium     Other request: patient stated Manda did not refill his prescription like he thought and now he will be out in a couple of days, so he does not have time to wait for them to request the refill due to no additional refills.  He is asking that he have this refill be sent one time to the [facility] pharmacy in [place] so he does not run out    Can we leave a detailed message on this number? YES    Phone number patient can be reached at: Home number on file [phone] (home)    Best Time:     Call taken on 3/18/2019 at 8:31 AM by Stephenie Olivier

## 2019-03-28 DIAGNOSIS — I10 HYPERTENSION GOAL BP (BLOOD PRESSURE) < 140/90: ICD-10-CM

## 2019-03-29 RX ORDER — METOPROLOL TARTRATE 50 MG
TABLET ORAL
Qty: 270 TABLET | Refills: 3 | Status: SHIPPED | OUTPATIENT
Start: 2019-03-29 | End: 2020-03-24

## 2019-03-29 NOTE — TELEPHONE ENCOUNTER
"metoprolol  Last Written Prescription Date:  3/27/2018  Last Fill Quantity: 270,  # refills: 3   Last office visit: 2/12/2019 with prescribing provider:     Future Office Visit:      Requested Prescriptions   Pending Prescriptions Disp Refills     metoprolol tartrate (LOPRESSOR) 50 MG tablet [Pharmacy Med Name: METOPROLOL TARTRATE 50 MG Tablet] 270 tablet 3     Sig: TAKE 2 TABLETS IN THE MORNING  AND TAKE 1 TABLET EVERY NIGHT    Beta-Blockers Protocol Passed - 3/28/2019 10:44 AM       Passed - Blood pressure under 140/90 in past 12 months    BP Readings from Last 3 Encounters:   02/12/19 136/84   12/11/18 117/61   11/06/18 118/72          Passed - Patient is age 6 or older       Passed - Recent (12 mo) or future (30 days) visit within the authorizing provider's specialty    Patient had office visit in the last 12 months or has a visit in the next 30 days with authorizing provider or within the authorizing provider's specialty.  See \"Patient Info\" tab in inbasket, or \"Choose Columns\" in Meds & Orders section of the refill encounter.             Passed - Medication is active on med list          Prescription approved per [facility] Refill Protocol.      Julianne Brown RN on 3/29/2019 at 1:02 PM    "

## 2019-05-10 ENCOUNTER — OFFICE VISIT (OUTPATIENT)
Dept: INTERNAL MEDICINE | Facility: CLINIC | Age: 79
End: 2019-05-10
Payer: COMMERCIAL

## 2019-05-10 VITALS
DIASTOLIC BLOOD PRESSURE: 70 MMHG | RESPIRATION RATE: 16 BRPM | BODY MASS INDEX: 24.28 KG/M2 | WEIGHT: 179 LBS | HEART RATE: 62 BPM | OXYGEN SATURATION: 97 % | SYSTOLIC BLOOD PRESSURE: 120 MMHG | TEMPERATURE: 97.2 F

## 2019-05-10 DIAGNOSIS — I10 ESSENTIAL HYPERTENSION WITH GOAL BLOOD PRESSURE LESS THAN 140/90: ICD-10-CM

## 2019-05-10 DIAGNOSIS — Z00.00 ENCOUNTER FOR MEDICARE ANNUAL WELLNESS EXAM: Primary | ICD-10-CM

## 2019-05-10 DIAGNOSIS — Z12.5 SCREENING FOR PROSTATE CANCER: ICD-10-CM

## 2019-05-10 DIAGNOSIS — E11.9 TYPE 2 DIABETES MELLITUS WITHOUT COMPLICATION, WITHOUT LONG-TERM CURRENT USE OF INSULIN (H): ICD-10-CM

## 2019-05-10 DIAGNOSIS — E78.5 HYPERLIPIDEMIA LDL GOAL <130: ICD-10-CM

## 2019-05-10 DIAGNOSIS — E87.6 HYPOPOTASSEMIA: ICD-10-CM

## 2019-05-10 LAB
ALBUMIN SERPL-MCNC: 4.1 G/DL (ref 3.4–5)
ALP SERPL-CCNC: 56 U/L (ref 40–150)
ALT SERPL W P-5'-P-CCNC: 36 U/L (ref 0–70)
ANION GAP SERPL CALCULATED.3IONS-SCNC: 6 MMOL/L (ref 3–14)
AST SERPL W P-5'-P-CCNC: 17 U/L (ref 0–45)
BILIRUB SERPL-MCNC: 0.6 MG/DL (ref 0.2–1.3)
BUN SERPL-MCNC: 28 MG/DL (ref 7–30)
CALCIUM SERPL-MCNC: 9.5 MG/DL (ref 8.5–10.1)
CHLORIDE SERPL-SCNC: 100 MMOL/L (ref 94–109)
CHOLEST SERPL-MCNC: 92 MG/DL
CO2 SERPL-SCNC: 33 MMOL/L (ref 20–32)
CREAT SERPL-MCNC: 1.12 MG/DL (ref 0.66–1.25)
CREAT UR-MCNC: 110 MG/DL
GFR SERPL CREATININE-BSD FRML MDRD: 63 ML/MIN/{1.73_M2}
GLUCOSE SERPL-MCNC: 130 MG/DL (ref 70–99)
HBA1C MFR BLD: 7.2 % (ref 0–5.6)
HDLC SERPL-MCNC: 29 MG/DL
LDLC SERPL CALC-MCNC: 47 MG/DL
MICROALBUMIN UR-MCNC: 6 MG/L
MICROALBUMIN/CREAT UR: 5.06 MG/G CR (ref 0–17)
NONHDLC SERPL-MCNC: 63 MG/DL
POTASSIUM SERPL-SCNC: 4 MMOL/L (ref 3.4–5.3)
PROT SERPL-MCNC: 7.3 G/DL (ref 6.8–8.8)
PSA SERPL-ACNC: 0.7 UG/L (ref 0–4)
SODIUM SERPL-SCNC: 139 MMOL/L (ref 133–144)
TRIGL SERPL-MCNC: 80 MG/DL

## 2019-05-10 PROCEDURE — 80061 LIPID PANEL: CPT | Performed by: INTERNAL MEDICINE

## 2019-05-10 PROCEDURE — G0103 PSA SCREENING: HCPCS | Performed by: INTERNAL MEDICINE

## 2019-05-10 PROCEDURE — 36415 COLL VENOUS BLD VENIPUNCTURE: CPT | Performed by: INTERNAL MEDICINE

## 2019-05-10 PROCEDURE — 83036 HEMOGLOBIN GLYCOSYLATED A1C: CPT | Performed by: INTERNAL MEDICINE

## 2019-05-10 PROCEDURE — 80053 COMPREHEN METABOLIC PANEL: CPT | Performed by: INTERNAL MEDICINE

## 2019-05-10 PROCEDURE — G0439 PPPS, SUBSEQ VISIT: HCPCS | Performed by: INTERNAL MEDICINE

## 2019-05-10 PROCEDURE — 82043 UR ALBUMIN QUANTITATIVE: CPT | Performed by: INTERNAL MEDICINE

## 2019-05-10 RX ORDER — LANCETS
EACH MISCELLANEOUS
Qty: 250 EACH | Refills: 3 | Status: SHIPPED | OUTPATIENT
Start: 2019-05-10

## 2019-05-10 RX ORDER — POTASSIUM CHLORIDE 1500 MG/1
TABLET, EXTENDED RELEASE ORAL
Qty: 270 TABLET | Refills: 3 | Status: SHIPPED | OUTPATIENT
Start: 2019-05-10 | End: 2020-06-12

## 2019-05-10 RX ORDER — AMILORIDE HYDROCHLORIDE AND HYDROCHLOROTHIAZIDE 5; 50 MG/1; MG/1
TABLET ORAL
Qty: 90 TABLET | Refills: 3 | Status: SHIPPED | OUTPATIENT
Start: 2019-05-10 | End: 2020-06-12

## 2019-05-10 RX ORDER — AMLODIPINE BESYLATE 5 MG/1
TABLET ORAL
Qty: 90 TABLET | Refills: 3 | Status: SHIPPED | OUTPATIENT
Start: 2019-05-10 | End: 2020-06-12

## 2019-05-10 SDOH — HEALTH STABILITY: MENTAL HEALTH: HOW OFTEN DO YOU HAVE A DRINK CONTAINING ALCOHOL?: 2-4 TIMES A MONTH

## 2019-05-10 ASSESSMENT — ENCOUNTER SYMPTOMS
FREQUENCY: 1
PALPITATIONS: 0
FEVER: 0
CONSTIPATION: 0
HEARTBURN: 0
SORE THROAT: 0
JOINT SWELLING: 1
EYE PAIN: 0
ARTHRALGIAS: 1
PARESTHESIAS: 0
NAUSEA: 0
ABDOMINAL PAIN: 0
SHORTNESS OF BREATH: 0
CHILLS: 0
HEMATURIA: 0
MYALGIAS: 0
COUGH: 0
HEMATOCHEZIA: 0
NERVOUS/ANXIOUS: 0
HEADACHES: 0
WEAKNESS: 0
DIZZINESS: 0
DYSURIA: 0
DIARRHEA: 0

## 2019-05-10 ASSESSMENT — PAIN SCALES - GENERAL: PAINLEVEL: NO PAIN (0)

## 2019-05-10 ASSESSMENT — ACTIVITIES OF DAILY LIVING (ADL): CURRENT_FUNCTION: NO ASSISTANCE NEEDED

## 2019-05-10 NOTE — PROGRESS NOTES
"SUBJECTIVE:   Merlin J Koppendrayer is a 78 year old male who presents for Preventive Visit.  Are you in the first 12 months of your Medicare coverage?  No    Healthy Habits:     In general, how would you rate your overall health?  Excellent    Frequency of exercise:  2-3 days/week    Duration of exercise:  Greater than 60 minutes    Do you usually eat at least 4 servings of fruit and vegetables a day, include whole grains    & fiber and avoid regularly eating high fat or \"junk\" foods?  Yes    Taking medications regularly:  Yes    Medication side effects:  None    Ability to successfully perform activities of daily living:  No assistance needed    Home Safety:  Throw rugs in the hallway    Hearing Impairment:  Difficulty following a conversation in a noisy restaurant or crowded room, feel that people are mumbling or not speaking clearly, need to ask people to speak up or repeat themselves, difficulty understanding soft or whispered speech and difficulty understanding speech on the telephone    In the past 6 months, have you been bothered by leaking of urine? Yes    In general, how would you rate your overall mental or emotional health?  Excellent      PHQ-2 Total Score: 0    Additional concerns today:  Yes      Sugars were starting go up a little to the 130 range.  He wonders about metformin and increased to 500mg bid, no side effects.  Occasionally diarrhea.    Checking sugars once a day, 120-140 range, sugars are brought in.     Do you feel safe in your environment? Yes    Do you have a Health Care Directive? No: Advance care planning reviewed with patient; information given to patient to review.    Fall risk  Fallen 2 or more times in the past year?: No  Any fall with injury in the past year?: No    Cognitive Screening   1) Repeat 3 items (Leader, Season, Table)    2) Clock draw: NORMAL  3) 3 item recall: Recalls 1 object   Results: NORMAL clock, 1-2 items recalled: COGNITIVE IMPAIRMENT LESS LIKELY    Mini-CogTM " Copyright KEEGAN [employer]. Licensed by the author for use in [facility]; reprinted with permission (bonita@[facility]). All rights reserved.      Do you have sleep apnea, excessive snoring or daytime drowsiness?: yes    Reviewed and updated as needed this visit by clinical staff         Reviewed and updated as needed this visit by Provider        Social History     Tobacco Use     Smoking status: Former Smoker     Last attempt to quit: 1974     Years since quittin.3     Smokeless tobacco: Never Used     Tobacco comment: Quit     Substance Use Topics     Alcohol use: Yes     Alcohol/week: 2.4 oz     Types: 4 Standard drinks or equivalent per week     Frequency: 2-4 times a month     If you drink alcohol do you typically have >3 drinks per day or >7 drinks per week? No    Alcohol Use 5/10/2019   Prescreen: >3 drinks/day or >7 drinks/week? No   Prescreen: >3 drinks/day or >7 drinks/week? -     Current providers sharing in care for this patient include:   Patient Care Team:  Zaid Oneill MD as PCP - General  Zaid Oneill MD as Assigned PCP    The following health maintenance items are reviewed in Epic and correct as of today:  Health Maintenance   Topic Date Due     ZOSTER IMMUNIZATION (1 of 2) 10/05/1992     MEDICARE ANNUAL WELLNESS VISIT  2017     ADVANCE DIRECTIVE PLANNING Q5 YRS  2017     FALL RISK ASSESSMENT  2018     EYE EXAM Q1 YEAR  2018     FOOT EXAM Q1 YEAR  10/27/2018     PHQ-2  2019     A1C Q6 MO  2019     CREATININE Q1 YEAR  2019     LIPID MONITORING Q1 YEAR  2019     MICROALBUMIN Q1 YEAR  2019     TSH W/ FREE T4 REFLEX Q2 YEAR  10/27/2019     DTAP/TDAP/TD IMMUNIZATION (4 - Td) 2022     INFLUENZA VACCINE  Completed     IPV IMMUNIZATION  Aged Out     MENINGITIS IMMUNIZATION  Aged Out       Pneumonia Vaccine:already done.     Review of Systems   Constitutional: Negative for chills and fever.   HENT: Positive for hearing loss.  Negative for congestion, ear pain and sore throat.    Eyes: Negative for pain and visual disturbance.   Respiratory: Negative for cough and shortness of breath.    Cardiovascular: Negative for chest pain, palpitations and peripheral edema.   Gastrointestinal: Negative for abdominal pain, constipation, diarrhea, heartburn, hematochezia and nausea.   Genitourinary: Positive for frequency and urgency. Negative for discharge, dysuria, genital sores, hematuria and impotence.   Musculoskeletal: Positive for arthralgias and joint swelling. Negative for myalgias.   Skin: Negative for rash.   Neurological: Negative for dizziness, weakness, headaches and paresthesias.   Psychiatric/Behavioral: Negative for mood changes. The patient is not nervous/anxious.        OBJECTIVE:   /78   Pulse 62   Temp 97.2  F (36.2  C) (Temporal)   Resp 16   Wt 81.2 kg (179 lb)   SpO2 97%   BMI 24.28 kg/m   Estimated body mass index is 24.14 kg/m  as calculated from the following:    Height as of 2/12/19: 1.829 m (6').    Weight as of 2/12/19: 80.7 kg (178 lb).  Physical Exam  GENERAL: healthy, alert and no distress  EYES: Eyes grossly normal to inspection, PERRL and conjunctivae and sclerae normal  HENT: ear canals and TM's normal, nose and mouth without ulcers or lesions  NECK: no adenopathy, no asymmetry, masses, or scars and thyroid normal to palpation  RESP: lungs clear to auscultation - no rales, rhonchi or wheezes  CV: regular rate and rhythm, normal S1 S2, no S3 or S4, no murmur, click or rub, no peripheral edema and peripheral pulses strong  ABDOMEN: soft, nontender, no hepatosplenomegaly, no masses and bowel sounds normal  MS: no gross musculoskeletal defects noted, no edema  SKIN: no suspicious lesions or rashes  NEURO: Normal strength and tone, mentation intact and speech normal  PSYCH: mentation appears normal, affect normal/bright      ASSESSMENT / PLAN:       ICD-10-CM    1. Encounter for Medicare annual wellness exam  Z00.00    2. Hyperlipidemia LDL goal <130 E78.5 Comprehensive metabolic panel     Lipid Profile     blood glucose monitoring (ACCU-CHEK FASTCLIX) lancets   3. Essential hypertension with goal blood pressure less than 140/90 I10 Comprehensive metabolic panel     Albumin Random Urine Quantitative with Creat Ratio     aMILoride-hydrochlorothiazide (MODURETIC) 5-50 MG TABS per tablet     amLODIPine (NORVASC) 5 MG tablet     potassium chloride ER (KLOR-CON) 20 MEQ CR tablet   4. Hypopotassemia E87.6 potassium chloride ER (KLOR-CON) 20 MEQ CR tablet   5. Type 2 diabetes mellitus without complication (H) E11.9    6. Screening for prostate cancer Z12.5 PSA, screen   7. Type 2 diabetes mellitus without complication, without long-term current use of insulin (H) E11.9 Hemoglobin A1c     metFORMIN (GLUCOPHAGE) 500 MG tablet       He is doing well.  Patient checks his blood sugars once a day, he records them, we do have them to scan into our computer system.  We will increase his metformin to twice a day due to higher blood sugars.  We will check his labs today.    Check a PSA for prostate screening    His colonoscopy is up-to-date.    End of Life Planning:  Patient currently has an advanced directive: No.  I have verified the patient's ablity to prepare an advanced directive/make health care decisions.  Literature was provided to assist patient in preparing an advanced directive.    COUNSELING:  Reviewed preventive health counseling, as reflected in patient instructions       Regular exercise    BP Readings from Last 1 Encounters:   02/12/19 136/84     Estimated body mass index is 24.14 kg/m  as calculated from the following:    Height as of 2/12/19: 1.829 m (6').    Weight as of 2/12/19: 80.7 kg (178 lb).           reports that he quit smoking about 45 years ago. He has never used smokeless tobacco.      Appropriate preventive services were discussed with this patient, including applicable screening as appropriate for  cardiovascular disease, diabetes, osteopenia/osteoporosis, and glaucoma.  As appropriate for age/gender, discussed screening for colorectal cancer, prostate cancer, breast cancer, and cervical cancer. Checklist reviewing preventive services available has been given to the patient.    Reviewed patients plan of care and provided an AVS. The Basic Care Plan (routine screening as documented in Health Maintenance) for Merlin meets the Care Plan requirement. This Care Plan has been established and reviewed with the Patient.    Counseling Resources:  ATP IV Guidelines  Pooled Cohorts Equation Calculator  Breast Cancer Risk Calculator  FRAX Risk Assessment  ICSI Preventive Guidelines  Dietary Guidelines for Americans, 2010  [employer]'s MyPlate  ASA Prophylaxis  Lung CA Screening    Zaid Oneill MD  [facility]    Identified Health Risks:    The patient was provided with written information regarding signs of hearing loss.  Information on urinary incontinence and treatment options given to patient.

## 2019-05-10 NOTE — PATIENT INSTRUCTIONS
BP Recheck Needed    Patient Education   Personalized Prevention Plan  You are due for the preventive services outlined below.  Your care team is available to assist you in scheduling these services.  If you have already completed any of these items, please share that information with your care team to update in your medical record.  Health Maintenance Due   Topic Date Due     Zoster (Shingles) Vaccine (1 of 2) 10/05/1992     Annual Wellness Visit  01/20/2017     Discuss Advance Directive Planning  05/11/2017     FALL RISK ASSESSMENT  01/23/2018     Eye Exam - yearly  06/21/2018     Diabetic Foot Exam - yearly  10/27/2018     PHQ-2  01/01/2019     A1C (Diabetes) Lab - every 6 months  03/21/2019     Creatinine Lab - yearly  03/29/2019     Cholesterol Lab - yearly  03/29/2019     Microalbumin Lab - yearly  03/29/2019       Signs of Hearing Loss     Hearing much better with one ear can be a sign of hearing loss.     Hearing loss is a problem shared by many people. In fact, it is one of the most common health conditions, particularly as people age. Most people over age 65 have some hearing loss, and by age 80, almost everyone does. Because hearing loss usually occurs slowly over the years, you may not realize your hearing ability has gotten worse.  Have your hearing checked  Contact your healthcare provider if you:    Have to strain to hear normal conversation    Have to watch other people s faces very carefully to follow what they re saying    Need to ask people to repeat what they ve said    Often misunderstand what people are saying    Turn the volume of the television or radio up so high that others complain    Feel that people are mumbling when they re talking to you    Find that the effort to hear leaves you feeling tired and irritated    Notice, when using the phone, that you hear better with one ear than the other  Date Last Reviewed: 12/1/2016 2000-2018 [employer]. [address] [facility],  [place] [address]. All rights reserved. This information is not intended as a substitute for professional medical care. Always follow your healthcare professional's instructions.          Urinary Incontinence (Male)    Urinary incontinence means not being able to control the release of urine from the bladder.  Causes  Common causes of urinary incontinence in men include:    Infection    Certain medicines    Aging    Poor pelvic muscle tone    Bladder spasms    Obesity    Urinary retention  Nervous system diseases, diabetes, sleep apnea, urinary tract infections, prostate surgery, and pelvic trauma can also cause incontinence. Constipation and smoking have also been identified as risk factors.  Symptoms    Urge incontinence (also called  overactive bladder ) is a sudden urge to urinate even though there may not be much urine in the bladder. The need to urinate often during the night is common. It is due to bladder spasms.    Stress incontinence is involuntary urine leakage that can occur with sneezing, coughing, and other actions that put stress on the bladder.  Treatment  Treatment of urinary incontinence depends on the cause. Infections of the bladder are treated with antibiotics. Urinary retention is treated with a bladder catheter.  Home care  Follow these guidelines when caring for yourself at home:    Don't consume foods and drinks that may irritate the bladder. These include drinks containing alcohol, caffeinate, or carbonation; chocolate; and acidic fruits and juices.    Limit fluid intake to 6 to 8 cups a day.    Lose weight if you are overweight. This will reduce your symptoms.    If needed, wear absorbent pads to catch urine. Change pads frequently to maintain hygiene and prevent skin and bladder infections.    Bathe daily to maintain good hygiene.    If an antibiotic was prescribed to treat a bladder infection, be sure to take it until finished, even if you are feeling better before then. This is to make  sure your infection has cleared.    If a catheter was left in place, it is important to keep bacteria from getting into the collection bag. Don't disconnect the catheter from the collection bag.    Use a leg band to secure the catheter drainage tube, so it does not pull on the catheter. Drain the collection bag when it becomes full using the drain spout at the bottom of the bag. Don't disconnect the bag from the catheter.    Don't pull on or try to remove a catheter. The catheter must be removed by a healthcare provider.  Follow-up care  Follow up with your healthcare provider, or as advised.  When to seek medical advice  Call your healthcare provider right away if any of these occur:    Fever over 100.4 F (38 C), or as directed by your healthcare provider    Bladder pain or fullness    Abdominal swelling, nausea, or vomiting    Back pain    Weakness, dizziness, or fainting    If a catheter was left in place, return if:  ? Catheter falls out  ? Catheter stops draining for 6 hours  Date Last Reviewed: 10/1/2017    [id number] [employer]. [address] [place] [address]. All rights reserved. This information is not intended as a substitute for professional medical care. Always follow your healthcare professional's instructions.

## 2019-05-16 ENCOUNTER — TELEPHONE (OUTPATIENT)
Dept: INTERNAL MEDICINE | Facility: CLINIC | Age: 79
End: 2019-05-16

## 2019-05-16 DIAGNOSIS — E11.9 TYPE 2 DIABETES MELLITUS WITHOUT COMPLICATION, WITHOUT LONG-TERM CURRENT USE OF INSULIN (H): ICD-10-CM

## 2019-05-16 NOTE — TELEPHONE ENCOUNTER
Reason for call:  Patient reporting a symptom    Symptom or request: Back Spasms - very uncomfortable.  Had jury duty for a few days and was just sitting.  Cant lift or twist, hard to get into car.    Merlin is aware Dr Oneill is not in today    Duration (how long have symptoms been present): 2 days    Have you been treated for this before? No    Additional comments: Merlin is requesting an appointment with Dr Oneill tomorrow Friday 05/17    Phone Number patient can be reached at:  Home number on file [phone] (home)    Best Time:  any    Can we leave a detailed message on this number:  YES    Call taken on 5/16/2019 at 12:55 PM by Diana Fisher

## 2019-05-16 NOTE — TELEPHONE ENCOUNTER
I am full tomorrow.  Sports medicine if they have openings or triage and could do some flexeril.

## 2019-05-16 NOTE — TELEPHONE ENCOUNTER
": 1940  PHONE #'s: [phone] (home)     PRESENTING PROBLEM:  Spoke with wife, Mary, as Merlin on his cell phone right now. \" He is having back spams this week. NO injury associated with this  He has been on jury duty. It's getting harder to sit. Would like to get in to be seen.     NURSING ASSESSMENT  Description:   Denies injury. They don't recall anything associated with it. Just very uncomfortable with the spasms.  Onset/duration:   Past few days.   Precip. factors:  Etiology unknown  Assoc. Sx:  Unknown. Spoke with his wife, Mary.   Improves/worsens Sx:   wose  Pain scale (1-10)   Unknown, spoke with wife, Mary.  Sx specific meds:   Ibuprofen 800 mg  Last exam/Tx:   Has NOT been seen for this.     RECOMMENDED DISPOSITION:  Hank set up to see KELLEN Tate, tomorrow at [facility],  8: 40 AM.   Home care advice - Back Spasm (No Trauma)    Spasm of the back muscles can occur after a sudden forceful twisting or bending force (such as in a car accident), after a simple awkward movement, or after lifting something heavy with poor body positioning. In any case, muscle spasm adds to the pain. Sleeping in an awkward position or on a poor quality mattress can also cause this. Some people respond to emotional stress by tensing the muscles of their back.  Pain that continues may need further evaluation or other types of treatment such as physical therapy.  You don't always need X-rays for the initial evaluation of back pain, unless you had a physical injury such as from a car accident or fall. If your pain continues and doesn't respond to medical treatment, X-rays and other tests may then be done.   Home care    As soon as possible, start sitting or walking again to avoid problems from prolonged bed rest (muscle weakness, worsening back stiffness and pain, blood clots in the legs).    When in bed, try to find a position of comfort. A firm mattress is best. Try lying flat on your back with pillows under your knees. You " can also try lying on your side with your knees bent up toward your chest and a pillow between your knees.    Avoid prolonged sitting, long car rides, or travel. This puts more stress on the lower back than standing or walking.     During the first 24 to 72 hours after an injury or flare-up, apply an ice pack to the painful area for 20 minutes, then remove it for 20 minutes. Do this over a period of 60 to 90 minutes or several times a day. This will reduce swelling and pain. Always wrap ice packs in a thin towel.    You can start with ice, then switch to heat. Heat (hot shower, hot bath, or heating pad) reduces pain, and works well for muscle spasms. Apply heat to the painful area for 20 minutes, then remove it for 20 minutes. Do this over a period of 60 to 90 minutes or several times a day. Do not sleep on a heating pad as it can burn or damage skin.    Alternate ice and heat therapies.    Be aware of safe lifting methods and do not lift anything over 15 pounds until all the pain is gone.  Gentle stretching will help your back heal faster. Do this simple routine 2 to 3 times a day until your back is feeling better.    Lie on your back with your knees bent and both feet on the ground    Slowly raise your left knee to your chest as you flatten your lower back against the floor. Hold for 20 to 30 seconds.    Relax and repeat the exercise with your right knee.    Do 2 to 3 of these exercises for each leg.    Repeat, hugging both knees to your chest at the same time.    Do not bounce, but use a gentle pull.  Medicines  Talk to your doctor before using medicine, especially if you have other medical problems or are taking other medicines.  You may use acetaminophen or ibuprofen to control pain, unless your healthcare provider prescribed another pain medicine. If you have a chronic condition such as diabetes, liver or kidney disease, stomach ulcer, or gastrointestinal bleeding, or are taking blood thinners, talk with your  healthcare provider before taking any medicines.  Be careful if you are given prescription pain medicine, narcotics, or medicine for muscle spasm. They can cause drowsiness, affect your coordination, reflexes, or judgment. Do not drive or operate heavy machinery when taking these medicines. Take pain medicine only as prescribed by your healthcare provider.  Follow-up care  Follow up with your doctor, or as advised. Physical therapy or further tests may be needed.  If X-rays were taken, they may be reviewed by a radiologist. You will be notified of any new findings that may affect your care.  Call 911  Call 911 if any of these occur:    Trouble breathing    Confusion    Drowsiness or trouble awakening    Fainting or loss of consciousness    Rapid or very slow heart rate    Loss of bowel or bladder control  When to seek medical advice  Call your healthcare provider right away if any of these occur:    Pain becomes worse or spreads to your legs    Weakness or numbness in one or both legs    Numbness in the groin or genital area    Fever of 100.4 F (38 C) or higher, or as directed by your healthcare provider    Burning or pain when passing urine  Date Last Reviewed: 6/1/2016 2000-2018 [employer]. [address] [place] [address]. All rights reserved. This information is not intended as a substitute for professional medical care. Always follow your healthcare professional's instructions.       Will comply with recommendation: YES  If further questions/concerns or if Sx do not improve, worsen or new Sx develop, call your PCP or [facility] as soon as possible.    NOTES:  Disposition was determined by the first positive assessment question, therefore all previous assessment questions were negative.  Informed to check provider manual or call insurance company to assure coverage.    Guideline used: Back Pain  Telephone Triage Protocols for Nurses, Fifth Edition, Angie Watkins  spasm- no trauma- Clinical References    Leni Du, [place]LA  May 16, 2019

## 2019-05-17 ENCOUNTER — ANCILLARY PROCEDURE (OUTPATIENT)
Dept: GENERAL RADIOLOGY | Facility: OTHER | Age: 79
End: 2019-05-17
Attending: PHYSICIAN ASSISTANT
Payer: COMMERCIAL

## 2019-05-17 ENCOUNTER — OFFICE VISIT (OUTPATIENT)
Dept: FAMILY MEDICINE | Facility: OTHER | Age: 79
End: 2019-05-17
Payer: COMMERCIAL

## 2019-05-17 VITALS
WEIGHT: 185 LBS | HEART RATE: 60 BPM | DIASTOLIC BLOOD PRESSURE: 76 MMHG | SYSTOLIC BLOOD PRESSURE: 132 MMHG | RESPIRATION RATE: 16 BRPM | TEMPERATURE: 97.9 F | BODY MASS INDEX: 25.09 KG/M2

## 2019-05-17 DIAGNOSIS — M54.41 ACUTE RIGHT-SIDED LOW BACK PAIN WITH RIGHT-SIDED SCIATICA: Primary | ICD-10-CM

## 2019-05-17 DIAGNOSIS — M54.41 ACUTE RIGHT-SIDED LOW BACK PAIN WITH RIGHT-SIDED SCIATICA: ICD-10-CM

## 2019-05-17 DIAGNOSIS — M62.830 BACK MUSCLE SPASM: ICD-10-CM

## 2019-05-17 PROCEDURE — 72100 X-RAY EXAM L-S SPINE 2/3 VWS: CPT | Mod: FY

## 2019-05-17 PROCEDURE — 99213 OFFICE O/P EST LOW 20 MIN: CPT | Performed by: PHYSICIAN ASSISTANT

## 2019-05-17 RX ORDER — MELOXICAM 15 MG/1
15 TABLET ORAL DAILY
Qty: 30 TABLET | Refills: 0 | Status: SHIPPED | OUTPATIENT
Start: 2019-05-17 | End: 2020-06-23

## 2019-05-17 RX ORDER — CYCLOBENZAPRINE HCL 10 MG
10 TABLET ORAL 3 TIMES DAILY PRN
Qty: 30 TABLET | Refills: 0 | Status: SHIPPED | OUTPATIENT
Start: 2019-05-17 | End: 2019-05-27

## 2019-05-17 ASSESSMENT — PAIN SCALES - GENERAL: PAINLEVEL: MILD PAIN (3)

## 2019-05-17 NOTE — PATIENT INSTRUCTIONS
Patient Education     Possible Causes of Low Back or Leg Pain    BIG: The symptoms in your back or leg may be due to pressure on a nerve. This pressure may be caused by a damaged disk or by abnormal bone growth. Either way, you may feel pain, burning, tingling, or numbness. If you have pressure on a nerve that connects to the sciatic nerve, pain may shoot down your leg.    Pressure from the disk  Constant wear and tear can weaken a disk over time and cause back pain. The disk can then be damaged by a sudden movement or injury. If its soft center starts to bulge, the disk may press on a nerve. Or the outside of the disk may tear, and the soft center may squeeze through and pinch a nerve.    Pressure from bone  As a disk wears out, the vertebrae right above and below the disk start to touch. This can put pressure on a nerve. Often, abnormal bone (called bone spurs) grows where the vertebrae rub against each other. This can cause the foramen or the spinal canal to narrow (called stenosis) and press against a nerve.  Date Last Reviewed: 3/1/2018    5843-6809 [employer]. [address] [place] [address]. All rights reserved. This information is not intended as a substitute for professional medical care. Always follow your healthcare professional's instructions.           Patient Education     Relieving Back Pain  Back pain is a common problem. You can strain back muscles by lifting too much weight or just by moving the wrong way. Back strain can be uncomfortable, even painful. And it can take weeks or months to improve. To help yourself feel better and prevent future back strains, try these tips.  Important: Don't give aspirin to children or teens without first discussing it with your child's healthcare provider.  Ice    Ice reduces muscle pain and swelling. It helps most during the first 24 to 48 hours after an injury.    Wrap an ice pack or a bag of frozen peas in a thin towel. Never put ice  directly on your skin.    Place the ice where your back hurts the most.    Don t ice for more than 20 minutes at a time.    You can use ice several times a day.  Medicines  Over-the-counter pain relievers include acetaminophen and anti-inflammatory medicines, which includes aspirin, naproxen, or ibuprofen. They can help ease discomfort. Some also reduce swelling.    Tell your healthcare provider about any medicines you are already taking.    Take medicines only as directed.  Manipulation and massage  Having manipulation by an osteopathic doctor or chiropractor may be helpful. Getting a massage also may help.   Heat  After the first 48 hours, heat can relax sore muscles and improve blood flow.    Try a warm bath or shower. Or use a heating pad set on low. To prevent a burn, keep a cloth between you and the heating pad.    Don t use a heating pad for more than 15 minutes at a time. Never sleep on a heating pad.  Date Last Reviewed: 6/1/2018 2000-2018 [employer]. [address] [place] [address]. All rights reserved. This information is not intended as a substitute for professional medical care. Always follow your healthcare professional's instructions.           Patient Education     Back Exercises: Back Press    Do this exercise on your hands and knees. Keep your knees under your hips and your hands under your shoulders. Keep your spine in a neutral position (not arched or sagging). Be sure to maintain your neck s natural curve:    Tighten your stomach and buttock muscles to press your back upward. Let your head drop slightly.    Hold for 5 seconds. Return to starting position.    Repeat 5 times.  Date Last Reviewed: 3/1/2018    5327-3955 [employer]. [address] [place] [address]. All rights reserved. This information is not intended as a substitute for professional medical care. Always follow your healthcare professional's instructions.           Patient  Education     Back Exercises: Back Release  Do this exercise on your hands and knees. Keep your knees under your hips and your hands under your shoulders.        Relax your abdominal and buttocks muscles, lift your head, and let your back sag. Be sure to keep your weight evenly distributed. Don t sit back on your hips.     Hold for 5 seconds.    Return to starting position.    Tuck your head and lift (arch) your back.    Hold for 5 seconds    Return to starting position.    Repeat 5 times.  Date Last Reviewed: 3/1/2018    5203-9645 [employer]. [address] [place] [address]. All rights reserved. This information is not intended as a substitute for professional medical care. Always follow your healthcare professional's instructions.           Patient Education     Back Exercises: Hip Rotator Stretch    To start, lie on your back with your knees bent and feet flat on the floor. Don t press your neck or lower back to the floor. Breathe deeply. You should feel comfortable and relaxed in this position.    Rest your right ankle on your left knee.    Place a towel behind your left thigh, and use it to pull the knee toward your chest. Feel the stretch in your buttocks.    Hold for 30 to 60 seconds. Release.    Repeat 2 times.    Switch legs.     If there is any pain other than stretch in the knee or buttock, stop and contact your healthcare provider.  For your safety, check with your healthcare provider before starting an exercise program.  Date Last Reviewed: 3/1/2018    6814-4019 [employer]. [address] [place] [address]. All rights reserved. This information is not intended as a substitute for professional medical care. Always follow your healthcare professional's instructions.           Patient Education     Understanding Lumbar Radiculopathy    Lumbar radiculopathy is irritation or inflammation of a nerve root in the low back. It causes symptoms that spread out from  the back down one or both legs. To understand this condition, it helps to understand the parts of the spine:    Vertebrae. These are bones that stack to form the spine. The lumbar spine contains the 5 bottom vertebrae.    Disks. These are soft pads of tissue between the vertebrae. They act as shock absorbers for the spine.    Spinal canal. This is a tunnel formed within the stacked vertebrae. In the lumbar spine, nerves run through this canal.    Nerves. These branch off and leave the spinal canal, traveling out to parts of the body. As they leave the spinal canal, nerves pass through openings between the vertebrae. The nerve root is the part of the nerve that is closest to the spinal canal.    Sciatic nerve. This is a large nerve formed from several nerve roots in the low back. This nerve extends down the back of the leg to the foot.  With lumbar radiculopathy, nerve roots in the low back become irritated. This leads to pain and symptoms. The sciatic nerve is commonly involved, so the condition is often called sciatica.  What causes lumbar radiculopathy?  Aging, injury, poor posture, extra body weight, and other issues can lead to problems in the low back. These problems may then irritate nerve roots. They include:    Damage to a disk in the lumbar spine. The damaged disk may then press on nearby nerve roots.    Degeneration from wear and tear, and aging. This can lead to narrowing (stenosis) of the openings between the vertebrae. The narrowed openings press on nerve roots as they leave the spinal canal.    Unstable spine. This is when a vertebra slips forward. It can then press on a nerve root.  Other, less common things can put pressure on nerves in the low back. These include diabetes, infection, or a tumor.  Symptoms of lumbar radiculopathy  These include:    Pain in the low back    Pain, numbness, tingling, or weakness that travels into the buttocks, hip, groin, or leg    Muscle spasms  Treatment for lumbar  radiculopathy  In most cases, your healthcare provider will first try treatments that help relieve symptoms. These may include:    Prescription and over-the-counter pain medicines. These help relieve pain, swelling, and irritation.    Limits on positions and activities that increase pain. But lying in bed or avoiding all movement is only recommended for a short period of time.    Physical therapy, including exercises and stretches. This helps decrease pain and increase movement and function.    Steroid shots into the lower back. This may help relieve symptoms for a time.    Weight-loss program. If you are overweight, losing extra pounds may help relieve symptoms.  In some cases, you may need surgery to fix the underlying problem. This depends on the cause, the symptoms, and how long the pain has lasted.  Possible complications  Over time, an irritated and inflamed nerve may become damaged. This may lead to long-lasting (permanent) numbness or weakness in your legs and feet. If symptoms change suddenly or get worse, be sure to let your healthcare provider know.  When to call your healthcare provider  Call your healthcare provider right away if you have any of these:    New pain or pain that gets worse    New or increasing weakness, tingling, or numbness in your leg or foot    Problems controlling your bladder or bowel   Date Last Reviewed: 3/10/2016    3781-4025 [employer]. [address] [place] [address], [place] [address]. All rights reserved. This information is not intended as a substitute for professional medical care. Always follow your healthcare professional's instructions.

## 2019-05-17 NOTE — LETTER
[facility]  [address] [facility] [id number] [phone]                    Merlin J Koppendrayer  [address] [address]      May 17, 2019      To whom it may concern,    Merlin PEPE Charity is currently under my general medical care. He is unsuitable for jury duty at this time due to {JURY DUTY:[id number]}.  Please excuse from jury duty until ***      Sincerely,    Michael Cuellar PA-C

## 2019-05-17 NOTE — PROGRESS NOTES
SUBJECTIVE:   Merlin J Koppendrayer is a 78 year old male who presents to clinic today for the following health issues:      HPI  Back Pain       Duration: 05/10        Specific cause: Yard work    Description:   Location of pain: low back right  Character of pain: Muscle spasm  Pain radiation:none  New numbness or weakness in legs, not attributed to pain:  no     Intensity: Currently 3/10, At its worst 15/10    History:   Pain interferes with job: Not applicable  History of back problems: recurrent self limited episodes of low back pain in the past  Any previous MRI or X-rays: None  Sees a specialist for back pain:  No  Therapies tried without relief: NSAIDs    Alleviating factors:   Improved by: Alleve      Precipitating factors:  Worsened by: Lifting, Bending and Twisting        Accompanying Signs & Symptoms:  Risk of Fracture:  None  Risk of Cauda Equina:  None  Risk of Infection:  None  Risk of Cancer:  None  Risk of Ankylosing Spondylitis:  Onset at age <35, male, AND morning back stiffness. no     Additional history: as documented    Reviewed and updated as needed this visit by clinical staff  Tobacco  Allergies  Meds  Med Hx  Surg Hx  Fam Hx  Soc Hx        Reviewed and updated as needed this visit by Provider         Patient Active Problem List   Diagnosis     Hearing loss     Inguinal hernia     Prostatitis     Pyelonephritis     HYPERLIPIDEMIA LDL GOAL <130     Advanced directives, counseling/discussion     MICHELLE (obstructive sleep apnea)     Anxiety attack     Type 2 diabetes mellitus without complications (H)     Gastroesophageal reflux disease without esophagitis     Essential hypertension with goal blood pressure less than 140/90     Acute right-sided low back pain with right-sided sciatica     Triceps strain, initial encounter     Past Surgical History:   Procedure Laterality Date     AMPUTATE TOE(S) Right 7/31/2015    Procedure: AMPUTATE TOE(S);  Surgeon: Neal Slater MD;  Location:   OR     C LAP,HERNIA REPAIR PROC,UNLIST       COLONOSCOPY  04/15/09     COLONOSCOPY N/A 2017    Procedure: COLONOSCOPY;  Colonoscopy;  Surgeon: Chapin Rodriguez MD;  Location: [facility]     EXCISE MASS FOOT Right 7/10/2015    Procedure: EXCISE MASS FOOT;  Surgeon: Nickolas Farah DPM;  Location: [facility]     REPAIR SYNDACTYLY FOOT Right 7/10/2015    Procedure: REPAIR SYNDACTYLY FOOT;  Surgeon: Nickolas Farah DPM;  Location: [facility] OR       Social History     Tobacco Use     Smoking status: Former Smoker     Last attempt to quit: 1974     Years since quittin.4     Smokeless tobacco: Never Used     Tobacco comment: Quit     Substance Use Topics     Alcohol use: Yes     Alcohol/week: 2.4 oz     Types: 4 Standard drinks or equivalent per week     Frequency: 2-4 times a month     Family History   Problem Relation Age of Onset     Diabetes Mother      Cerebrovascular Disease Mother      Diabetes Father      Cancer - colorectal Maternal Uncle      Connective Tissue Disorder Paternal Uncle         lupus     Connective Tissue Disorder Other         nieces and nephews with lupus     C.A.D. Brother         x 2         Current Outpatient Medications   Medication Sig Dispense Refill     ACCU-CHEK SMARTVIEW test strip USE ONE STRIP TO CHECK GLUCOSE TWICE DAILY (VARY  THE  TIME  OF  DAY) 100 strip 3     acetaminophen (TYLENOL) 325 MG tablet Take 325-650 mg by mouth every 6 hours as needed for mild pain       aMILoride-hydrochlorothiazide (MODURETIC) 5-50 MG TABS per tablet TAKE 1 TABLET EVERY DAY 90 tablet 3     amLODIPine (NORVASC) 5 MG tablet TAKE 1 TABLET EVERY DAY 90 tablet 3     ASPIRIN 81 MG OR TABS 1 tab po QD (Once per day) 100 3     atorvastatin (LIPITOR) 40 MG tablet TAKE 1 TABLET EVERY DAY 90 tablet 3     blood glucose monitoring (ACCU-CHEK FASTCLIX) lancets Use 1 needle 2-3 times 250 each 3     metFORMIN (GLUCOPHAGE) 500 MG tablet Take 1 tablet (500 mg) by mouth 2 times daily (with meals) 180 tablet  3     metoprolol tartrate (LOPRESSOR) 50 MG tablet TAKE 2 TABLETS IN THE MORNING  AND TAKE 1 TABLET EVERY NIGHT 270 tablet 3     omeprazole (PRILOSEC) 20 MG CR capsule TAKE 1 CAPSULE (20 MG) BY MOUTH DAILY 90 capsule 3     order for DME Equipment ordered: RESMED Auto PAP Mask type: Full face  Settings: 8-15 cm h2o       potassium chloride ER (K-DUR/KLOR-CON M) 20 MEQ CR tablet Take 1 tablet (20 mEq) by mouth 3 times daily 90 tablet 0     Cholecalciferol (VITAMIN D PO) Take by mouth daily       potassium chloride ER (KLOR-CON) 20 MEQ CR tablet TAKE 1 TABLET THREE TIMES DAILY (DUE FOR LAB CHECK BEFORE NEXT REFILL) (Patient not taking: Reported on 5/17/2019) 270 tablet 3     Allergies   Allergen Reactions     Sulfa Drugs      rash     Recent Labs   Lab Test 05/10/19  0812 09/21/18  0818 03/29/18  0757 10/27/17  0817  01/23/17  0844  02/11/15  0744   A1C 7.2* 7.1* 7.7*  --    < > 7.0*   < > 6.8*   LDL 47  --  54  --   --  60   < > 59   HDL 29*  --  34*  --   --  39*   < > 37*   TRIG 80  --  99  --   --  69   < > 102   ALT 36  --  50  --   --  30   < > 37   CR 1.12  --  0.97  --   --  1.09   < > 1.16   GFRESTIMATED 63  --  76  --   --  66   < > 62   GFRESTBLACK 73  --  >90  --   --  80   < > 75   POTASSIUM 4.0  --  3.8  --   --  3.8   < > 3.3*   TSH  --   --   --  3.60  --   --   --  3.74    < > = values in this interval not displayed.      BP Readings from Last 3 Encounters:   05/17/19 132/76   05/10/19 120/70   02/12/19 136/84    Wt Readings from Last 3 Encounters:   05/17/19 83.9 kg (185 lb)   05/10/19 81.2 kg (179 lb)   02/12/19 80.7 kg (178 lb)                  Labs reviewed in EPIC    ROS:  Constitutional, HEENT, cardiovascular, pulmonary, GI, , musculoskeletal, neuro, skin, endocrine and psych systems are negative, except as otherwise noted.    OBJECTIVE:     /76 (Cuff Size: Adult Regular)   Pulse 60   Temp 97.9  F (36.6  C) (Temporal)   Resp 16   Wt 83.9 kg (185 lb)   BMI 25.09 kg/m    Body mass  index is 25.09 kg/m .  GENERAL: healthy, alert and no distress  RESP: lungs clear to auscultation - no rales, rhonchi or wheezes  CV: regular rate and rhythm, normal S1 S2, no S3 or S4, no murmur, click or rub, no peripheral edema and peripheral pulses strong  ABDOMEN: soft, nontender, no hepatosplenomegaly, no masses and bowel sounds normal  MS: extremities normal- no gross deformities noted  SKIN: no suspicious lesions or rashes to visible skin  NEURO: Normal strength and tone, sensory exam grossly normal and mentation intact  Comprehensive back pain exam:  Tenderness of right lateral mid back, low chest wall, Range of motion not limited by pain, Lower extremity strength functional and equal on both sides, Lower extremity reflexes within normal limits bilaterally and Lower extremity sensation normal and equal on both sides  PSYCH: mentation appears normal, affect normal/bright    Diagnostic Test Results:  Xray - degenerative changes to the lumbar spine    ASSESSMENT/PLAN:     1. Acute right-sided low back pain with right-sided sciatica  Meds as listed and ROV in 4 weeks  - XR Lumbar Spine 2/3 Views; Future    ROV 4 weeks.    Michael Cuellar PA-C  [facility]

## 2019-06-07 ENCOUNTER — DOCUMENTATION ONLY (OUTPATIENT)
Dept: SLEEP MEDICINE | Facility: CLINIC | Age: 79
End: 2019-06-07

## 2019-06-07 NOTE — PROGRESS NOTES
6-7/2019- JULIE L- MERLIN CALLED AND SCHEDULED MASK FITTING IN [place] FOR 6- AT 9AM.  EMAIL SENT TO FABIAN ZEPEDA

## 2019-06-13 ENCOUNTER — APPOINTMENT (OUTPATIENT)
Dept: SLEEP MEDICINE | Facility: CLINIC | Age: 79
End: 2019-06-13
Payer: COMMERCIAL

## 2019-07-16 ENCOUNTER — TELEPHONE (OUTPATIENT)
Dept: INTERNAL MEDICINE | Facility: CLINIC | Age: 79
End: 2019-07-16

## 2019-07-16 DIAGNOSIS — I10 ESSENTIAL HYPERTENSION WITH GOAL BLOOD PRESSURE LESS THAN 140/90: Primary | ICD-10-CM

## 2019-07-16 NOTE — TELEPHONE ENCOUNTER
Reason for Call:  Other dose increase requested for potassium    Detailed comments: Merlin calls reporting an increase in fatigue, and leg cramps.  In the past this has indicated low potassium. Is asking if Dr Oneill would approve an increase in his potassium dose.     Phone Number Patient can be reached at: Cell number on file:    Telephone Information:   Mobile [phone]       Best Time: any time    Can we leave a detailed message on this number? YES    Call taken on 7/16/2019 at 8:11 AM by Dixie Morales

## 2019-07-16 NOTE — TELEPHONE ENCOUNTER
Patient's wife (C2C) was informed that Dr. Oneill would have him check a lab for basic panel to see if potassium is low. The order is done, patient just needs to schedule a lab only appointment.    Salome Glass, CMA

## 2019-07-17 DIAGNOSIS — I10 ESSENTIAL HYPERTENSION WITH GOAL BLOOD PRESSURE LESS THAN 140/90: ICD-10-CM

## 2019-07-17 LAB
ANION GAP SERPL CALCULATED.3IONS-SCNC: 5 MMOL/L (ref 3–14)
BUN SERPL-MCNC: 20 MG/DL (ref 7–30)
CALCIUM SERPL-MCNC: 8.6 MG/DL (ref 8.5–10.1)
CHLORIDE SERPL-SCNC: 97 MMOL/L (ref 94–109)
CO2 SERPL-SCNC: 34 MMOL/L (ref 20–32)
CREAT SERPL-MCNC: 1.02 MG/DL (ref 0.66–1.25)
GFR SERPL CREATININE-BSD FRML MDRD: 71 ML/MIN/{1.73_M2}
GLUCOSE SERPL-MCNC: 147 MG/DL (ref 70–99)
POTASSIUM SERPL-SCNC: 3.4 MMOL/L (ref 3.4–5.3)
SODIUM SERPL-SCNC: 136 MMOL/L (ref 133–144)

## 2019-07-17 PROCEDURE — 80048 BASIC METABOLIC PNL TOTAL CA: CPT | Performed by: INTERNAL MEDICINE

## 2019-07-17 PROCEDURE — 36415 COLL VENOUS BLD VENIPUNCTURE: CPT | Performed by: INTERNAL MEDICINE

## 2019-09-28 ENCOUNTER — HEALTH MAINTENANCE LETTER (OUTPATIENT)
Age: 79
End: 2019-09-28

## 2019-10-29 DIAGNOSIS — E78.5 HYPERLIPIDEMIA LDL GOAL <130: ICD-10-CM

## 2019-10-30 ENCOUNTER — IMMUNIZATION (OUTPATIENT)
Dept: FAMILY MEDICINE | Facility: OTHER | Age: 79
End: 2019-10-30
Payer: COMMERCIAL

## 2019-10-30 PROCEDURE — 90662 IIV NO PRSV INCREASED AG IM: CPT

## 2019-10-30 PROCEDURE — G0008 ADMIN INFLUENZA VIRUS VAC: HCPCS

## 2019-10-30 RX ORDER — ATORVASTATIN CALCIUM 40 MG/1
TABLET, FILM COATED ORAL
Qty: 90 TABLET | Refills: 1 | Status: SHIPPED | OUTPATIENT
Start: 2019-10-30 | End: 2020-04-30

## 2019-10-30 NOTE — TELEPHONE ENCOUNTER
"Requested Prescriptions   Pending Prescriptions Disp Refills     atorvastatin (LIPITOR) 40 MG tablet [Pharmacy Med Name: ATORVASTATIN CALCIUM 40 MG Tablet] 90 tablet 3     Sig: TAKE 1 TABLET EVERY DAY   Last Written Prescription Date:  9/21/18  Last Fill Quantity: 90,  # refills: 3   Last office visit: 5/10/2019 with prescribing provider:  Nino   Future Office Visit:        Statins Protocol Passed - 10/29/2019  4:01 PM        Passed - LDL on file in past 12 months     Recent Labs   Lab Test 05/10/19  0812   LDL 47             Passed - No abnormal creatine kinase in past 12 months     Recent Labs   Lab Test 07/28/16  1611   CKT 57                Passed - Recent (12 mo) or future (30 days) visit within the authorizing provider's specialty     Patient has had an office visit with the authorizing provider or a provider within the authorizing providers department within the previous 12 mos or has a future within next 30 days. See \"Patient Info\" tab in inbasket, or \"Choose Columns\" in Meds & Orders section of the refill encounter.              Passed - Medication is active on med list        Passed - Patient is age 18 or older          "

## 2019-10-30 NOTE — TELEPHONE ENCOUNTER
Prescription approved per [facility] Refill Protocol.    Mkaenna Westbrook RN on 10/30/2019 at 10:20 AM

## 2019-11-05 ENCOUNTER — TELEPHONE (OUTPATIENT)
Dept: INTERNAL MEDICINE | Facility: CLINIC | Age: 79
End: 2019-11-05

## 2019-11-05 DIAGNOSIS — E11.9 TYPE 2 DIABETES MELLITUS WITHOUT COMPLICATION, WITHOUT LONG-TERM CURRENT USE OF INSULIN (H): Primary | ICD-10-CM

## 2019-11-05 NOTE — TELEPHONE ENCOUNTER
Reason for Call: Request for an order or referral:    Order or referral being requested: A1C    Date needed: at your convenience    Has the patient been seen by the PCP for this problem? YES    Additional comments:     Phone number Patient can be reached at:  Home number on file [phone] (home)    Best Time:      Can we leave a detailed message on this number?  YES    Call taken on 11/5/2019 at 11:01 AM by Ashia Culver

## 2019-11-11 DIAGNOSIS — E11.9 TYPE 2 DIABETES MELLITUS WITHOUT COMPLICATION, WITHOUT LONG-TERM CURRENT USE OF INSULIN (H): ICD-10-CM

## 2019-11-11 LAB — HBA1C MFR BLD: 7.3 % (ref 0–5.6)

## 2019-11-11 PROCEDURE — 83036 HEMOGLOBIN GLYCOSYLATED A1C: CPT | Mod: QW | Performed by: INTERNAL MEDICINE

## 2019-11-11 PROCEDURE — 36415 COLL VENOUS BLD VENIPUNCTURE: CPT | Performed by: INTERNAL MEDICINE

## 2019-11-13 DIAGNOSIS — K21.9 GASTROESOPHAGEAL REFLUX DISEASE WITHOUT ESOPHAGITIS: ICD-10-CM

## 2019-11-14 NOTE — TELEPHONE ENCOUNTER
"Omeprazole  Last Written Prescription Date:  11/21/2018  Last Fill Quantity: 90,  # refills: 3   Last office visit: 5/10/2019 with prescribing provider:     Future Office Visit:    Requested Prescriptions   Pending Prescriptions Disp Refills     omeprazole (PRILOSEC) 20 MG DR capsule [Pharmacy Med Name: OMEPRAZOLE 20 MG Capsule Delayed Release] 90 capsule 0     Sig: TAKE 1 CAPSULE EVERY DAY       PPI Protocol Passed - 11/13/2019  5:34 PM        Passed - Not on Clopidogrel (unless Pantoprazole ordered)        Passed - No diagnosis of osteoporosis on record        Passed - Recent (12 mo) or future (30 days) visit within the authorizing provider's specialty     Patient has had an office visit with the authorizing provider or a provider within the authorizing providers department within the previous 12 mos or has a future within next 30 days. See \"Patient Info\" tab in inbasket, or \"Choose Columns\" in Meds & Orders section of the refill encounter.              Passed - Medication is active on med list        Passed - Patient is age 18 or older          "

## 2019-12-20 DIAGNOSIS — E87.6 HYPOPOTASSEMIA: ICD-10-CM

## 2019-12-20 DIAGNOSIS — I10 ESSENTIAL HYPERTENSION WITH GOAL BLOOD PRESSURE LESS THAN 140/90: Primary | ICD-10-CM

## 2019-12-20 RX ORDER — POTASSIUM CHLORIDE 1500 MG/1
20 TABLET, EXTENDED RELEASE ORAL 2 TIMES DAILY
Qty: 15 TABLET | Refills: 0 | Status: SHIPPED | OUTPATIENT
Start: 2019-12-20 | End: 2020-06-11

## 2019-12-20 NOTE — TELEPHONE ENCOUNTER
Reason for Call:  Medication or medication refill:    Do you use a [facility]?  Name of the pharmacy and phone number for the current request:  [facility] - [phone] (Fax [phone])    Name of the medication requested: potassium    Other request: pt would like 15 pills. Stated his mail order is running late.    Can we leave a detailed message on this number? YES    Phone number patient can be reached at: Cell number on file:    Telephone Information:   Mobile [phone]       Best Time:     Call taken on 12/20/2019 at 9:15 AM by Ashia Culver

## 2020-03-12 DIAGNOSIS — E11.9 TYPE 2 DIABETES MELLITUS WITHOUT COMPLICATION (H): ICD-10-CM

## 2020-03-12 DIAGNOSIS — E78.5 HYPERLIPIDEMIA LDL GOAL <130: ICD-10-CM

## 2020-03-13 RX ORDER — BLOOD SUGAR DIAGNOSTIC
STRIP MISCELLANEOUS
Qty: 100 STRIP | Refills: 1 | Status: SHIPPED | OUTPATIENT
Start: 2020-03-13 | End: 2020-09-23

## 2020-03-13 NOTE — TELEPHONE ENCOUNTER
"Test strips  Last Written Prescription Date:  01/24/2019  Last Fill Quantity: 100,  # refills: 3   Last office visit: 5/10/2019 with prescribing provider: Nino   Future Office Visit:  none    Requested Prescriptions   Pending Prescriptions Disp Refills     ACCU-CHEK SMARTVIEW test strip [Pharmacy Med Name: Accu-Chek SmartView In Vitro Strip]  0     Sig: USE 1 STRIP TO CHECK GLUCOSE TWICE DAILY (VARY  THE  TIME  OF  DAY)       Diabetic Supplies Protocol Failed - 3/12/2020  9:09 AM        Failed - Recent (6 mo) or future (30 days) visit within the authorizing provider's specialty     Patient had office visit in the last 6 months or has a visit in the next 30 days with authorizing provider.  See \"Patient Info\" tab in inbasket, or \"Choose Columns\" in Meds & Orders section of the refill encounter.            Passed - Medication is active on med list        Passed - Patient is 18 years of age or older             "

## 2020-03-24 DIAGNOSIS — I10 HYPERTENSION GOAL BP (BLOOD PRESSURE) < 140/90: ICD-10-CM

## 2020-03-24 RX ORDER — METOPROLOL TARTRATE 50 MG
TABLET ORAL
Qty: 270 TABLET | Refills: 3 | Status: SHIPPED | OUTPATIENT
Start: 2020-03-24 | End: 2020-12-16

## 2020-03-24 NOTE — TELEPHONE ENCOUNTER
Prescription approved per [facility] Refill Protocol.  Julianne Brown RN

## 2020-04-10 DIAGNOSIS — G47.33 OBSTRUCTIVE SLEEP APNEA (ADULT) (PEDIATRIC): Primary | ICD-10-CM

## 2020-04-30 DIAGNOSIS — E78.5 HYPERLIPIDEMIA LDL GOAL <130: ICD-10-CM

## 2020-04-30 RX ORDER — ATORVASTATIN CALCIUM 40 MG/1
TABLET, FILM COATED ORAL
Qty: 90 TABLET | Refills: 0 | Status: SHIPPED | OUTPATIENT
Start: 2020-04-30 | End: 2020-06-12

## 2020-04-30 NOTE — TELEPHONE ENCOUNTER
Prescription approved per [facility] Refill Protocol.    Makenna Westbrook RN on 4/30/2020 at 1:48 PM

## 2020-06-08 DIAGNOSIS — I10 ESSENTIAL HYPERTENSION WITH GOAL BLOOD PRESSURE LESS THAN 140/90: ICD-10-CM

## 2020-06-08 DIAGNOSIS — E87.6 HYPOPOTASSEMIA: ICD-10-CM

## 2020-06-10 NOTE — TELEPHONE ENCOUNTER
Left message for patient to call back and speak with any .    Thank you!  Tanesha Tijerina ~ Patient Representative

## 2020-06-10 NOTE — TELEPHONE ENCOUNTER
Pending Prescriptions:                       Disp   Refills    potassium chloride ER (KLOR-CON M) 20 MEQ *270 ta*         Sig: TAKE 1 TABLET THREE TIMES DAILY  (DUE  FOR  LAB            CHECK  BEFORE  NEXT REFILL)    Last Written Prescription Date:  5/10/19  Last Fill Quantity: 270,  # refills: 3   Last office visit: 5/10/2019 with prescribing provider:  Nino   Future Office Visit:      Routing to team to set up physical appointment for patient  Please also ask patient how much medication she has left and schedule appropriately.  If patient needs a refill before their appointment, please route to Nino for completion of refill.

## 2020-06-11 RX ORDER — POTASSIUM CHLORIDE 1500 MG/1
TABLET, EXTENDED RELEASE ORAL
Qty: 270 TABLET | Refills: 1 | Status: SHIPPED | OUTPATIENT
Start: 2020-06-11 | End: 2020-12-03

## 2020-06-11 NOTE — TELEPHONE ENCOUNTER
Patient calling back. Gave message below and scheduled patient for a phone visit with his provider

## 2020-06-11 NOTE — TELEPHONE ENCOUNTER
2nd attempt to reach out to patient. Left message for patient to call back and speak with any . Will route back to team so a letter can be sent.    Thank you,  Felicitas Alvarado   for [facility]

## 2020-06-12 ENCOUNTER — VIRTUAL VISIT (OUTPATIENT)
Dept: INTERNAL MEDICINE | Facility: CLINIC | Age: 80
End: 2020-06-12
Payer: COMMERCIAL

## 2020-06-12 VITALS — SYSTOLIC BLOOD PRESSURE: 136 MMHG | BODY MASS INDEX: 23.6 KG/M2 | WEIGHT: 174 LBS | DIASTOLIC BLOOD PRESSURE: 70 MMHG

## 2020-06-12 DIAGNOSIS — Z00.00 ENCOUNTER FOR MEDICARE ANNUAL WELLNESS EXAM: Primary | ICD-10-CM

## 2020-06-12 DIAGNOSIS — I10 ESSENTIAL HYPERTENSION WITH GOAL BLOOD PRESSURE LESS THAN 140/90: ICD-10-CM

## 2020-06-12 DIAGNOSIS — E11.9 TYPE 2 DIABETES MELLITUS WITHOUT COMPLICATION, WITHOUT LONG-TERM CURRENT USE OF INSULIN (H): ICD-10-CM

## 2020-06-12 DIAGNOSIS — E78.5 HYPERLIPIDEMIA LDL GOAL <130: ICD-10-CM

## 2020-06-12 PROCEDURE — 99397 PER PM REEVAL EST PAT 65+ YR: CPT | Mod: TEL | Performed by: INTERNAL MEDICINE

## 2020-06-12 RX ORDER — AMILORIDE HYDROCHLORIDE AND HYDROCHLOROTHIAZIDE 5; 50 MG/1; MG/1
TABLET ORAL
Qty: 90 TABLET | Refills: 3 | Status: SHIPPED | OUTPATIENT
Start: 2020-06-12 | End: 2021-06-28

## 2020-06-12 RX ORDER — ATORVASTATIN CALCIUM 40 MG/1
TABLET, FILM COATED ORAL
Qty: 90 TABLET | Refills: 3 | Status: SHIPPED | OUTPATIENT
Start: 2020-06-12 | End: 2021-06-28

## 2020-06-12 RX ORDER — AMLODIPINE BESYLATE 5 MG/1
TABLET ORAL
Qty: 90 TABLET | Refills: 3 | Status: SHIPPED | OUTPATIENT
Start: 2020-06-12 | End: 2021-06-28

## 2020-06-12 NOTE — PROGRESS NOTES
"Merlin J Koppendrayer is a 79 year old male who is being evaluated via a billable video visit.      The patient has been notified of following:     \"This video visit will be conducted via a call between you and your physician/provider. We have found that certain health care needs can be provided without the need for an in-person physical exam.  This service lets us provide the care you need with a video conversation.  If a prescription is necessary we can send it directly to your pharmacy.  If lab work is needed we can place an order for that and you can then stop by our lab to have the test done at a later time.    Video visits are billed at different rates depending on your insurance coverage.  Please reach out to your insurance provider with any questions.    If during the course of the call the physician/provider feels a video visit is not appropriate, you will not be charged for this service.\"    Patient has given verbal consent for Video visit? Yes  Phone:  [phone]  Will anyone else be joining your video visit? No    Subjective     Merlin J Koppendrayer is a 79 year old male who presents today via video visit for the following health issues:    HPI  Chief Complaint   Patient presents with     Telephone     recheck medication and wellness exam       Video Start Time: 1:09 PM attempted video but didn't work.      Annual Wellness Visit    Are you in the first 12 months of your Medicare Part B coverage?  No    Physical Health:    In general, how would you rate your overall physical health? excellent    Outside of work, how many days during the week do you exercise?none    Outside of work, approximately how many minutes a day do you exercise?not applicable    If you drink alcohol do you typically have >3 drinks per day or >7 drinks per week? No    Do you usually eat at least 4 servings of fruit and vegetables a day, include whole grains & fiber and avoid regularly eating high fat or \"junk\" foods? Yes    Do you " have any problems taking medications regularly? No    Do you have any side effects from medications? none    Needs assistance for the following daily activities: no assistance needed    Which of the following safety concerns are present in your home?  none identified     Hearing impairment: Yes, wears hearing    In the past 6 months, have you been bothered by leaking of urine? no    There were no vitals taken for this visit.  Weight: Unable to obtain due to video visit  Height: Unable to obtain due to video visit  BMI: Unable to obtain due to video visit  Blood Pressure: Unable to obtain due to video visit    Mental Health:    In general, how would you rate your overall mental or emotional health? excellent  PHQ-2 Score: 0    Do you feel safe in your environment? Yes    Have you ever done Advance Care Planning? (For example, a Health Directive, POLST, or a discussion with a medical provider or your loved ones about your wishes)? No, advance care planning information given to patient to review.  Advanced care planning was discussed at today's visit.    Fall risk:  Fallen 2 or more times in the past year?: No  Any fall with injury in the past year?: No    Cognitive Screening: Unable to complete due to virtual visit; need for additional assessment in future face-to-face visit    Do you have sleep apnea, excessive snoring or daytime drowsiness?: yes    Current providers sharing in care for this patient include:   Patient Care Team:  Zaid Oneill MD as PCP - General  Zaid Oneill MD as Assigned PCP      He is doing well, needs medications refilled. He is active but not exercise, used to go to the gym before Covid closed that down.      No chest pains, no sob, bowels are loose at times with metformin.      Sugars are pretty good if he minds the diet.  Last hgba1c was 7.2      Doing social distancing.              Patient Active Problem List   Diagnosis     Hearing loss     Inguinal hernia     Prostatitis      Pyelonephritis     HYPERLIPIDEMIA LDL GOAL <130     Advanced directives, counseling/discussion     MICHELLE (obstructive sleep apnea)     Anxiety attack     Type 2 diabetes mellitus without complications (H)     Gastroesophageal reflux disease without esophagitis     Essential hypertension with goal blood pressure less than 140/90     Acute right-sided low back pain with right-sided sciatica     Triceps strain, initial encounter     Past Surgical History:   Procedure Laterality Date     AMPUTATE TOE(S) Right 2015    Procedure: AMPUTATE TOE(S);  Surgeon: Neal Slater MD;  [facility]     C LAP,HERNIA REPAIR PROC,UNLIST       COLONOSCOPY  04/15/09     COLONOSCOPY N/A 2017    Procedure: COLONOSCOPY;  Colonoscopy;  Surgeon: Chapin Rodriguez MD;  [facility]     EXCISE MASS FOOT Right 7/10/2015    Procedure: EXCISE MASS FOOT;  Surgeon: Nickolas Farah DPM;  [facility]     REPAIR SYNDACTYLY FOOT Right 7/10/2015    Procedure: REPAIR SYNDACTYLY FOOT;  Surgeon: Nickolas Farah DPM;  Location[facility]       Social History     Tobacco Use     Smoking status: Former Smoker     Last attempt to quit: 1974     Years since quittin.4     Smokeless tobacco: Never Used     Tobacco comment: Quit     Substance Use Topics     Alcohol use: Yes     Alcohol/week: 4.0 standard drinks     Types: 4 Standard drinks or equivalent per week     Frequency: 2-4 times a month     Family History   Problem Relation Age of Onset     Diabetes Mother      Cerebrovascular Disease Mother      Diabetes Father      Cancer - colorectal Maternal Uncle      Connective Tissue Disorder Paternal Uncle         lupus     Connective Tissue Disorder Other         nieces and nephews with lupus     C.A.D. Brother         x 2         Current Outpatient Medications   Medication Sig Dispense Refill     ACCU-CHEK SMARTVIEW test strip USE 1 STRIP TO CHECK GLUCOSE TWICE DAILY (VARY  THE  TIME  OF  DAY) 100 strip 1      aMILoride-hydrochlorothiazide (MODURETIC) 5-50 MG TABS per tablet TAKE 1 TABLET EVERY DAY 90 tablet 3     amLODIPine (NORVASC) 5 MG tablet TAKE 1 TABLET EVERY DAY 90 tablet 3     ASPIRIN 81 MG OR TABS 1 tab po QD (Once per day) 100 3     atorvastatin (LIPITOR) 40 MG tablet TAKE 1 TABLET EVERY DAY 90 tablet 0     blood glucose monitoring (ACCU-CHEK FASTCLIX) lancets Use 1 needle 2-3 times 250 each 3     metFORMIN (GLUCOPHAGE) 500 MG tablet Take 1 tablet (500 mg) by mouth 2 times daily (with meals) 180 tablet 3     metoprolol tartrate (LOPRESSOR) 50 MG tablet TAKE 2 TABLETS IN THE MORNING  AND TAKE 1 TABLET EVERY NIGHT 270 tablet 3     omeprazole (PRILOSEC) 20 MG DR capsule TAKE 1 CAPSULE EVERY DAY 90 capsule 3     order for DME Equipment ordered: [employer] Auto PAP Mask type: Full face  Settings: 8-15 cm h2o       potassium chloride ER (KLOR-CON M) 20 MEQ CR tablet TAKE 1 TABLET THREE TIMES DAILY  (DUE  FOR  LAB  CHECK  BEFORE  NEXT REFILL) 270 tablet 1     acetaminophen (TYLENOL) 325 MG tablet Take 325-650 mg by mouth every 6 hours as needed for mild pain       Cholecalciferol (VITAMIN D PO) Take by mouth daily       meloxicam (MOBIC) 15 MG tablet Take 1 tablet (15 mg) by mouth daily (Patient not taking: Reported on 6/12/2020) 30 tablet 0     Allergies   Allergen Reactions     Sulfa Drugs      rash       Reviewed and updated as needed this visit by Provider         Review of Systems   Constitutional, HEENT, cardiovascular, pulmonary, gi and gu systems are negative, except as otherwise noted.  CONSTITUTIONAL: NEGATIVE for fever, chills, change in weight  INTEGUMENTARY/SKIN: NEGATIVE for worrisome rashes, moles or lesions  EYES: NEGATIVE for vision changes or irritation  ENT/MOUTH: NEGATIVE for ear, mouth and throat problems  RESP: NEGATIVE for significant cough or SOB  CV: NEGATIVE for chest pain, palpitations or peripheral edema  GI: NEGATIVE for nausea, abdominal pain, heartburn, or change in bowel habits  :  NEGATIVE for frequency, dysuria, or hematuria  MUSCULOSKELETAL: NEGATIVE for significant arthralgias or myalgia  NEURO: NEGATIVE for weakness, dizziness or paresthesias  ENDOCRINE: sugar are good.   HEME: NEGATIVE for bleeding problems  PSYCHIATRIC: NEGATIVE for changes in mood or affect      Objective    There were no vitals taken for this visit.  Estimated body mass index is 25.09 kg/m  as calculated from the following:    Height as of 2/12/19: 1.829 m (6').    Weight as of 5/17/19: 83.9 kg (185 lb).  Physical Exam     GENERAL: Healthy, alert and no distress  EYES: Eyes grossly normal to inspection.  No discharge or erythema, or obvious scleral/conjunctival abnormalities.  RESP: No audible wheeze, cough, or visible cyanosis.  No visible retractions or increased work of breathing.    PSYCH: Mentation appears normal, affect normal/bright, judgement and insight intact, normal speech and appearance well-groomed.      Diagnostic Test Results:  Labs reviewed in Epic        Assessment & Plan       ICD-10-CM    1. Encounter for Medicare annual wellness exam  Z00.00    2. Essential hypertension with goal blood pressure less than 140/90  I10 amLODIPine (NORVASC) 5 MG tablet     aMILoride-hydrochlorothiazide (MODURETIC) 5-50 MG TABS per tablet   3. Hyperlipidemia LDL goal <130  E78.5 atorvastatin (LIPITOR) 40 MG tablet   4. Type 2 diabetes mellitus without complication, without long-term current use of insulin (H)  E11.9 metFORMIN (GLUCOPHAGE) 500 MG tablet     **Comprehensive metabolic panel FUTURE anytime     Lipid panel reflex to direct LDL Fasting     **A1C FUTURE 3mo     Albumin Random Urine Quantitative with Creat Ratio     Medicare wellness exam on this patient is 79 years old.  He is got a history of type 2 diabetes, hyperlipidemia, hypertension.  Overall he is doing well his mentation and cognition is intact.  He is active but not exercising right now with the gym is been close due to the COVID-19.  Unfortunately  the video visit did not work we did do a phone visit with him.  He is not sound depressed I guess a cognition is intact, meds are doing well.    We refilled his medications for the next year, ordered labs for him to do in 3 months.    Colonoscopy is up-to-date and due in 2022    Immunizations he is due to get a pneumonia 23 shot, he could get his shingles shot, tetanus and Prevnar 13 are up-to-date.        Return in about 3 months (around 9/12/2020) for Lab Work.    Zaid Oneill MD  [facility]      Video-Visit Details    Type of service:  Video Visit    Video End Time:1:26 PM 14 minutes on phone as video didn't work     Originating Location (pt. Location): Home    Distant Location (provider location):  [facility]     Platform used for Video Visit: Doximity    No follow-ups on file.       Zaid Oneill MD

## 2020-06-23 ENCOUNTER — OFFICE VISIT (OUTPATIENT)
Dept: FAMILY MEDICINE | Facility: CLINIC | Age: 80
End: 2020-06-23
Payer: COMMERCIAL

## 2020-06-23 VITALS
WEIGHT: 175.38 LBS | DIASTOLIC BLOOD PRESSURE: 68 MMHG | RESPIRATION RATE: 12 BRPM | BODY MASS INDEX: 24.55 KG/M2 | HEIGHT: 71 IN | TEMPERATURE: 96.4 F | OXYGEN SATURATION: 97 % | HEART RATE: 58 BPM | SYSTOLIC BLOOD PRESSURE: 136 MMHG

## 2020-06-23 DIAGNOSIS — R59.1 LYMPHADENOPATHY: Primary | ICD-10-CM

## 2020-06-23 PROCEDURE — 99213 OFFICE O/P EST LOW 20 MIN: CPT | Performed by: FAMILY MEDICINE

## 2020-06-23 ASSESSMENT — PAIN SCALES - GENERAL: PAINLEVEL: NO PAIN (0)

## 2020-06-23 ASSESSMENT — MIFFLIN-ST. JEOR: SCORE: 1545.8

## 2020-06-23 NOTE — PROGRESS NOTES
Subjective     Merlin J Koppendrayer is a 79 year old male who presents to clinic today for the following health issues:    HPI   Chief Complaint   Patient presents with     Mass     Noticed t-1. Rt side groin. Painful with pressure only. Brackney size. Firm     Poorly noticed a mass on the right side of his groin and inner aspect of his upper thigh.  He has had a history of malignant melanoma on that leg is status post a couple toe amputations for this disease.  No other complaints at this time no night sweats no weight loss.          Patient Active Problem List   Diagnosis     Hearing loss     Inguinal hernia     Prostatitis     Pyelonephritis     HYPERLIPIDEMIA LDL GOAL <130     Advanced directives, counseling/discussion     MICHELLE (obstructive sleep apnea)     Anxiety attack     Type 2 diabetes mellitus without complications (H)     Gastroesophageal reflux disease without esophagitis     Essential hypertension with goal blood pressure less than 140/90     Acute right-sided low back pain with right-sided sciatica     Triceps strain, initial encounter     Past Surgical History:   Procedure Laterality Date     AMPUTATE TOE(S) Right 2015    Procedure: AMPUTATE TOE(S);  Surgeon: Neal Slater MD;  [facility]     C LAP,HERNIA REPAIR PROC,UNLIST       COLONOSCOPY  04/15/09     COLONOSCOPY N/A 2017    Procedure: COLONOSCOPY;  Colonoscopy;  Surgeon: Chapin Rodriguez MD;  [facility]     EXCISE MASS FOOT Right 7/10/2015    Procedure: EXCISE MASS FOOT;  Surgeon: Nickolas Farah DPM;  [facility]     REPAIR SYNDACTYLY FOOT Right 7/10/2015    Procedure: REPAIR SYNDACTYLY FOOT;  Surgeon: Nickolas Farah DPM;  [facility]       Social History     Tobacco Use     Smoking status: Former Smoker     Last attempt to quit: 1974     Years since quittin.5     Smokeless tobacco: Never Used     Tobacco comment: Quit     Substance Use Topics     Alcohol use: Yes     Alcohol/week: 4.0  standard drinks     Types: 4 Standard drinks or equivalent per week     Frequency: 2-4 times a month     Family History   Problem Relation Age of Onset     Diabetes Mother      Cerebrovascular Disease Mother      Diabetes Father      Cancer - colorectal Maternal Uncle      Connective Tissue Disorder Paternal Uncle         lupus     Connective Tissue Disorder Other         nieces and nephews with lupus     C.A.D. Brother         x 2         Current Outpatient Medications   Medication Sig Dispense Refill     ACCU-CHEK SMARTVIEW test strip USE 1 STRIP TO CHECK GLUCOSE TWICE DAILY (VARY  THE  TIME  OF  DAY) 100 strip 1     aMILoride-hydrochlorothiazide (MODURETIC) 5-50 MG TABS per tablet TAKE 1 TABLET EVERY DAY 90 tablet 3     amLODIPine (NORVASC) 5 MG tablet TAKE 1 TABLET EVERY DAY 90 tablet 3     ASPIRIN 81 MG OR TABS 1 tab po QD (Once per day) 100 3     atorvastatin (LIPITOR) 40 MG tablet One tablet daily 90 tablet 3     blood glucose monitoring (ACCU-CHEK FASTCLIX) lancets Use 1 needle 2-3 times 250 each 3     metFORMIN (GLUCOPHAGE) 500 MG tablet Take 1 tablet (500 mg) by mouth 2 times daily (with meals) 180 tablet 3     metoprolol tartrate (LOPRESSOR) 50 MG tablet TAKE 2 TABLETS IN THE MORNING  AND TAKE 1 TABLET EVERY NIGHT 270 tablet 3     omeprazole (PRILOSEC) 20 MG DR capsule TAKE 1 CAPSULE EVERY DAY 90 capsule 3     order for DME Equipment ordered: [employer] Auto PAP Mask type: Full face  Settings: 8-15 cm h2o       potassium chloride ER (KLOR-CON M) 20 MEQ CR tablet TAKE 1 TABLET THREE TIMES DAILY  (DUE  FOR  LAB  CHECK  BEFORE  NEXT REFILL) 270 tablet 1     Cholecalciferol (VITAMIN D PO) Take by mouth daily         Reviewed and updated as needed this visit by Provider         Review of Systems   Constitutional, HEENT, cardiovascular, pulmonary, gi and gu systems are negative, except as otherwise noted.      Objective    /68   Pulse 58   Temp 96.4  F (35.8  C) (Tympanic)   Resp 12   Ht 1.808 m (5'  "11.2\")   Wt 79.5 kg (175 lb 6 oz)   SpO2 97%   BMI 24.32 kg/m    Body mass index is 24.32 kg/m .  Physical Exam   GENERAL: healthy, alert and no distress  LYMPH: Does have a 3 x 3 cm lymph node in the inguinal chain on the right.  Firm but mobile.  No other inguinal lymphadenopathy was present.        Assessment & Plan   Assessment      Plan  1. Lymphadenopathy  With history of malignant melanoma in his right foot now with right inguinal lymphadenopathy plan the patient will be referred to the oncology surgical service.  I did speak with our surgeon appear and that was her recommendation with his history of malignant melanoma.  The appointment will be made.          Michael Giraldo MD  [facility]        "

## 2020-06-25 ENCOUNTER — PATIENT OUTREACH (OUTPATIENT)
Dept: SURGERY | Facility: CLINIC | Age: 80
End: 2020-06-25

## 2020-06-25 DIAGNOSIS — R19.09 RIGHT GROIN MASS: ICD-10-CM

## 2020-06-25 DIAGNOSIS — C43.71 MALIGNANT MELANOMA OF SKIN OF TOE OF RIGHT FOOT (H): Primary | ICD-10-CM

## 2020-06-25 NOTE — TELEPHONE ENCOUNTER
New Patient Oncology Nurse Navigator Note     Referring provider: Dr. Giraldo     Referring Clinic/Organization: [facility]     Referred to: Surgical Oncology - Melanoma     Requested provider (if applicable): First available - did not specify     Referral Received: 06/25/20       Evaluation for : Groin Lymphadenopathy, history of melanoma.      Clinical History (per Nurse review of records provided):      - patient underwent excision of soft tissue mass between the right second interdigital mass, confirmed melanoma.     - patient was then sent up for amputation of right 2nd and 3rd toe following biopsy confirming malignancy.     - Patient had PET scan in 2015 that confirmed no evidence of distant metastatic disease.     - patient was seen recently with [facility] for evaluation of mass in right groin area, pain with pressure and described as marble size.       Pathology:    7/13/2015 - full report in [facility]   Skin, right 2nd interdigital space (OSC [id number] obtained 07/10/15):        - Melanoma - (see description).    7/31/2015 - full report in epic   FINAL DIAGNOSIS:   Right foot, second and third toes, amputation:   - Scar with foreign body reaction and old hemorrhage consistent with  previous biopsy.   - No evidence of residual malignant melanoma (invasive or in situ).   - Margins free of malignant melanoma.   - Benign skin, bone, and cartilage.          Clinical Assessment / Barriers to Care (Per Nurse):    - None at this time.       Records Location: [facility]     Records Needed:     None at this time.     Additional testing needed prior to consult:     - PET scan and IR biopsy of right groin mass. Following biopsy will plan for consult with provider in the office.       Referral updates:     06/25/2020 8:42 AM - Called and spoke with patient regarding referral and recommendations for patient to have PET scan as well as biopsy of the groin mass. Informed him that we will ask that scheduling arrange for  PET scan first and then a visit to discuss biopsy after the scan so they can imaging to review. Informed him that following the consult with the IR team I will watch to see when a biopsy is scheduled and we would plan to see him in the office to discuss results and surgical follow up after the biopsy. He was agreeable to this plan and would like to proceed as discussed. Provided him with my direct number to call should he have any questions or concerns.       Malena Brown RN, BSN

## 2020-07-02 ENCOUNTER — TELEPHONE (OUTPATIENT)
Dept: SURGERY | Facility: CLINIC | Age: 80
End: 2020-07-02

## 2020-07-02 ENCOUNTER — PRE VISIT (OUTPATIENT)
Dept: ONCOLOGY | Facility: CLINIC | Age: 80
End: 2020-07-02

## 2020-07-02 NOTE — TELEPHONE ENCOUNTER
Oncology/Surgical Oncology Referral Request:     Specialty Requested: Medical Oncology     Referring Provider: Michael Trujillo MD    Referring Clinic/Organization: [facility]    Records location: [facility]     Requested Provider (if specified): Not Specified

## 2020-07-02 NOTE — TELEPHONE ENCOUNTER
PREVISIT INFORMATION                                                    Merlin J Koppendrayer scheduled for future visit at [facility].    Patient is scheduled to see Dr. Keller on 7/7/20  Reason for visit: lympadema  Referring provider Michael Trujillo MD  Has patient seen previous specialist? No  Medical Records:  Available in chart.  Patient was previously seen at a [place] or [facility] facility.    REVIEW                                                      New patient packet mailed to patient: No  Medication reconciliation complete: No      Current Outpatient Medications   Medication Sig Dispense Refill     ACCU-CHEK SMARTVIEW test strip USE 1 STRIP TO CHECK GLUCOSE TWICE DAILY (VARY  THE  TIME  OF  DAY) 100 strip 1     aMILoride-hydrochlorothiazide (MODURETIC) 5-50 MG TABS per tablet TAKE 1 TABLET EVERY DAY 90 tablet 3     amLODIPine (NORVASC) 5 MG tablet TAKE 1 TABLET EVERY DAY 90 tablet 3     ASPIRIN 81 MG OR TABS 1 tab po QD (Once per day) 100 3     atorvastatin (LIPITOR) 40 MG tablet One tablet daily 90 tablet 3     blood glucose monitoring (ACCU-CHEK FASTCLIX) lancets Use 1 needle 2-3 times 250 each 3     Cholecalciferol (VITAMIN D PO) Take by mouth daily       metFORMIN (GLUCOPHAGE) 500 MG tablet Take 1 tablet (500 mg) by mouth 2 times daily (with meals) 180 tablet 3     metoprolol tartrate (LOPRESSOR) 50 MG tablet TAKE 2 TABLETS IN THE MORNING  AND TAKE 1 TABLET EVERY NIGHT 270 tablet 3     omeprazole (PRILOSEC) 20 MG DR capsule TAKE 1 CAPSULE EVERY DAY 90 capsule 3     order for DME Equipment ordered: [employer] Auto PAP Mask type: Full face  Settings: 8-15 cm h2o       potassium chloride ER (KLOR-CON M) 20 MEQ CR tablet TAKE 1 TABLET THREE TIMES DAILY  (DUE  FOR  LAB  CHECK  BEFORE  NEXT REFILL) 270 tablet 1       Allergies: Sulfa drugs        PLAN/FOLLOW-UP NEEDED                                                      Previsit review complete.  Patient  will see provider at future scheduled appointment.     Patient Reminders Given:  Please, make sure you bring an updated list of your medications.   If you are having a procedure, please, present 15 minutes early.  If you need to cancel or reschedule,please call [phone].    Shweta Simmons LPN

## 2020-07-02 NOTE — TELEPHONE ENCOUNTER
"Writer called to speak with patient but patient was not available. Writer spoke with wife Laura to inform her that we are not seeing patients in clinic due to Covid 19. Patient wife stated they \"are not very tech madalyn\". Writer explained how the video visit can be conducted through My Chart and she said she was willing to try it. Writer explained we will be calling a few minutes before the appointment to check him in and if at that time they are not able to do video at that time, then we can change it to a telephone visit. Patient's wife agreed to stephen.    Patient scheduled to have PET scan tomorrow 7/3/20.    Shweta Simmons LPN    "

## 2020-07-03 ENCOUNTER — ANCILLARY PROCEDURE (OUTPATIENT)
Dept: PET IMAGING | Facility: CLINIC | Age: 80
End: 2020-07-03
Attending: SURGERY
Payer: COMMERCIAL

## 2020-07-03 DIAGNOSIS — C43.71 MALIGNANT MELANOMA OF SKIN OF TOE OF RIGHT FOOT (H): ICD-10-CM

## 2020-07-03 LAB
CREAT BLD-MCNC: 1.1 MG/DL (ref 0.66–1.25)
GFR SERPL CREATININE-BSD FRML MDRD: 65 ML/MIN/{1.73_M2}

## 2020-07-03 PROCEDURE — 78816 PET IMAGE W/CT FULL BODY: CPT | Mod: PS | Performed by: RADIOLOGY

## 2020-07-03 PROCEDURE — A9552 F18 FDG: HCPCS | Performed by: RADIOLOGY

## 2020-07-03 RX ORDER — IOPAMIDOL 755 MG/ML
10-135 INJECTION, SOLUTION INTRAVASCULAR ONCE
Status: COMPLETED | OUTPATIENT
Start: 2020-07-03 | End: 2020-07-03

## 2020-07-03 RX ADMIN — IOPAMIDOL 105 ML: 755 INJECTION, SOLUTION INTRAVASCULAR at 13:04

## 2020-07-06 ENCOUNTER — VIRTUAL VISIT (OUTPATIENT)
Dept: INTERVENTIONAL RADIOLOGY/VASCULAR | Facility: CLINIC | Age: 80
End: 2020-07-06
Attending: SURGERY
Payer: COMMERCIAL

## 2020-07-06 DIAGNOSIS — R59.9 ENLARGED LYMPH NODES: Primary | ICD-10-CM

## 2020-07-06 NOTE — LETTER
7/6/2020       RE: Merlin J Charity  [address] [address]     Dear Colleague,    Thank you for referring your patient, Merlin J Koppendrayer, to the [facility] INTERVENTIONAL RADIOLOGY at [facility]. Please see a copy of my visit note below.    Philipp is a 80 yo male who is being evaluated via a billable telephone visit.       Please call patient on Home phone.      The patient has been notified of following:      This telephone visit will be conducted via a call between you and your physician/provider. We have found that certain health care needs can be provided without the need for a physical exam.  This service lets us provide the care you need with a short phone conversation.  If a prescription is necessary we can send it directly to your pharmacy.  If lab work is needed we can place an order for that and you can then stop by our lab to have the test done at a later time.     Telephone visits are billed at different rates depending on your insurance coverage. During this emergency period, for some insurers they may be billed the same as an in-person visit.  Please reach out to your insurance provider with any questions.     If during the course of the call the physician/provider feels a telephone visit is not appropriate, you will not be charged for this service.     Physician has received verbal consent for a Telephone Visit from the patient? Yes     How would you like to obtain your AVS?  My chart    Call started at  2:40 PM  Call ended at  3:00 PM  _________________________________________________  Interventional Radiology Consult    First Name: Merlin   Age: 79 year old   Referring Physician: Dr. Keller   REASON FOR REFERRAL: Consult for lymph node biopsy    Assessment:  Merlin is a 80 yo with a hx of melanoma in the web space between 2nd & 3rd toe, treated definitively with amputation.  Patient now has a new hypermetabolic right inguinal lymph node concerning  for malignancy, biopsy is requested.    Plan:  Image guided right inguinal lymph node biopsy  Will need updated blood work on the day of the biopsy    HPI: This is a patient with type 2 DM on oral medications, HTN, HLD, and esophageal reflux.  In 2015 he was diagnosed with melanoma in the web space between the right foot, 2nd & 3rd toe.  Biopsy margin was positive.  Pt underwent amputation of both toes, margins negative 7/10/2015.  He has annual skin checks performed.  Recently when Philipp was showering he noticed the lump in his right groin and knew that this was not normal.  He had an office visit with family practice.  Patient was referred to oncology surgical service.  (Dr. Keller)  PET scan 7/3/20 revealed:    Hypermetabolic right inguinal lymph node measuring 15 mm with max SUV 12.5. Focal hypermetabolic activity in the rectum 6 cm from anal verge with max SUV 7.1, with mild wall thickening on CT. Additional focus of hypermetabolic activity in the distal small bowel with Max SUV 10.7 correlating with short segment wall thickening seen on CT (series 5 image 371). Decompressed loops of bowel in the right side of the pelvis. Mildly dilated small bowel loops in the low mid abdomen.      PAST MEDICAL HISTORY:   Past Medical History:   Diagnosis Date     Amblyopia of eye, left      Bilateral cataracts 2016     Diabetes (H)      Diabetic eye exam (H) 12/17/14     Esophageal reflux      Hypersomnia with sleep apnea, unspecified      Inguinal hernia without mention of obstruction or gangrene, unilateral or unspecified, (not specified as recurrent)     s/p repair     Melanoma (H) 07/10/2015    web space between 2 & 3 toe, bx to periphery of margin.  Amputation of 2&3 toe, margins clear     Mixed hyperlipidemia      Other isolated or specific phobias     claustrophobia     Presbyopia      Prostatitis, unspecified      Pyelonephritis, unspecified      Unspecified essential hypertension      Unspecified hearing loss      PAST  SURGICAL HISTORY:   Past Surgical History:   Procedure Laterality Date     AMPUTATE TOE(S) Right 2015    Procedure: AMPUTATE TOE(S);  Surgeon: Neal Slater MD;  Location: [facility]     C LAP,HERNIA REPAIR PROC,UNLIST       cataract extraction & yag laser capsulotomy Bilateral 2016     COLONOSCOPY  04/15/09     COLONOSCOPY N/A 2017    Procedure: COLONOSCOPY;  Colonoscopy;  Surgeon: Chapin Rodriguez MD;  Location: [facility]     EXCISE MASS FOOT Right 7/10/2015    Procedure: EXCISE MASS FOOT;  Surgeon: Nickolas Farah DPM;  Location: [facility]     REPAIR SYNDACTYLY FOOT Right 7/10/2015    Procedure: REPAIR SYNDACTYLY FOOT;  Surgeon: Nickolas Farah DPM;  Location: [facility]     FAMILY HISTORY:   Family History   Problem Relation Age of Onset     Diabetes Mother      Cerebrovascular Disease Mother      Diabetes Father      Cancer - colorectal Maternal Uncle      Connective Tissue Disorder Paternal Uncle         lupus     Connective Tissue Disorder Other         nieces and nephews with lupus     C.A.D. Brother         x 2     SOCIAL HISTORY:   Social History     Tobacco Use     Smoking status: Former Smoker     Last attempt to quit: 1974     Years since quittin.5     Smokeless tobacco: Never Used     Tobacco comment: Quit     Substance Use Topics     Alcohol use: Yes     Alcohol/week: 4.0 standard drinks     Types: 4 Standard drinks or equivalent per week     Frequency: 2-4 times a month     PROBLEM LIST:   Patient Active Problem List    Diagnosis Date Noted     Acute right-sided low back pain with right-sided sciatica 10/27/2017     Priority: Medium     Triceps strain, initial encounter 10/27/2017     Priority: Medium     Essential hypertension with goal blood pressure less than 140/90 2016     Priority: Medium     Gastroesophageal reflux disease without esophagitis 2016     Priority: Medium     Type 2 diabetes mellitus without complications (H) 2015     Priority: Medium      Anxiety attack 08/12/2015     Priority: Medium     MICHELLE (obstructive sleep apnea) 09/03/2013     Priority: Medium     Advanced directives, counseling/discussion 05/11/2012     Priority: Medium       Discussed advance care planning with patient; however, patient declined at this time. 5/11/2012

## 2020-07-07 ENCOUNTER — VIRTUAL VISIT (OUTPATIENT)
Dept: SURGERY | Facility: CLINIC | Age: 80
End: 2020-07-07
Attending: FAMILY MEDICINE
Payer: COMMERCIAL

## 2020-07-07 DIAGNOSIS — C43.71 MALIGNANT MELANOMA OF RIGHT LOWER EXTREMITY INCLUDING HIP (H): Primary | ICD-10-CM

## 2020-07-07 DIAGNOSIS — R59.1 LYMPHADENOPATHY: ICD-10-CM

## 2020-07-07 PROCEDURE — 99202 OFFICE O/P NEW SF 15 MIN: CPT | Mod: 95 | Performed by: SURGERY

## 2020-07-07 NOTE — PROGRESS NOTES
Philipp is a 80 yo male who is being evaluated via a billable telephone visit.       Please call patient on Home phone.      The patient has been notified of following:      This telephone visit will be conducted via a call between you and your physician/provider. We have found that certain health care needs can be provided without the need for a physical exam.  This service lets us provide the care you need with a short phone conversation.  If a prescription is necessary we can send it directly to your pharmacy.  If lab work is needed we can place an order for that and you can then stop by our lab to have the test done at a later time.     Telephone visits are billed at different rates depending on your insurance coverage. During this emergency period, for some insurers they may be billed the same as an in-person visit.  Please reach out to your insurance provider with any questions.     If during the course of the call the physician/provider feels a telephone visit is not appropriate, you will not be charged for this service.     Physician has received verbal consent for a Telephone Visit from the patient? Yes     How would you like to obtain your AVS?  My chart    Call started at  2:40 PM  Call ended at  3:00 PM  _________________________________________________  Interventional Radiology Consult    First Name: Merlin   Age: 79 year old   Referring Physician: Dr. Keller   REASON FOR REFERRAL: Consult for lymph node biopsy    Assessment:  Merlin is a 80 yo with a hx of melanoma in the web space between 2nd & 3rd toe, treated definitively with amputation.  Patient now has a new hypermetabolic right inguinal lymph node concerning for malignancy, biopsy is requested.    Plan:  Image guided right inguinal lymph node biopsy  Will need updated blood work on the day of the biopsy    HPI: This is a patient with type 2 DM on oral medications, HTN, HLD, and esophageal reflux.  In 2015 he was diagnosed with melanoma in the web  space between the right foot, 2nd & 3rd toe.  Biopsy margin was positive.  Pt underwent amputation of both toes, margins negative 7/10/2015.  He has annual skin checks performed.  Recently when Philipp was showering he noticed the lump in his right groin and knew that this was not normal.  He had an office visit with family practice.  Patient was referred to oncology surgical service.  (Dr. Keller)  PET scan 7/3/20 revealed:    Hypermetabolic right inguinal lymph node measuring 15 mm with max SUV 12.5. Focal hypermetabolic activity in the rectum 6 cm from anal verge with max SUV 7.1, with mild wall thickening on CT. Additional focus of hypermetabolic activity in the distal small bowel with Max SUV 10.7 correlating with short segment wall thickening seen on CT (series 5 image 371). Decompressed loops of bowel in the right side of the pelvis. Mildly dilated small bowel loops in the low mid abdomen.      PAST MEDICAL HISTORY:   Past Medical History:   Diagnosis Date     Amblyopia of eye, left      Bilateral cataracts 2016     Diabetes (H)      Diabetic eye exam (H) 12/17/14     Esophageal reflux      Hypersomnia with sleep apnea, unspecified      Inguinal hernia without mention of obstruction or gangrene, unilateral or unspecified, (not specified as recurrent)     s/p repair     Melanoma (H) 07/10/2015    web space between 2 & 3 toe, bx to periphery of margin.  Amputation of 2&3 toe, margins clear     Mixed hyperlipidemia      Other isolated or specific phobias     claustrophobia     Presbyopia      Prostatitis, unspecified      Pyelonephritis, unspecified      Unspecified essential hypertension      Unspecified hearing loss      PAST SURGICAL HISTORY:   Past Surgical History:   Procedure Laterality Date     AMPUTATE TOE(S) Right 7/31/2015    Procedure: AMPUTATE TOE(S);  Surgeon: Neal Slater MD;  [facility]     C LAP,HERNIA REPAIR PROC,UNLIST       cataract extraction & yag laser capsulotomy Bilateral 2016      COLONOSCOPY  04/15/09     COLONOSCOPY N/A 2017    Procedure: COLONOSCOPY;  Colonoscopy;  Surgeon: hCapin Rodriguez MD;  [facility]: [facility]     EXCISE MASS FOOT Right 7/10/2015    Procedure: EXCISE MASS FOOT;  Surgeon: Nickolas Farah DPM;  [facility]     REPAIR SYNDACTYLY FOOT Right 7/10/2015    Procedure: REPAIR SYNDACTYLY FOOT;  Surgeon: Nickolas Farah DPM;  Location: [facility]     FAMILY HISTORY:   Family History   Problem Relation Age of Onset     Diabetes Mother      Cerebrovascular Disease Mother      Diabetes Father      Cancer - colorectal Maternal Uncle      Connective Tissue Disorder Paternal Uncle         lupus     Connective Tissue Disorder Other         nieces and nephews with lupus     C.A.D. Brother         x 2     SOCIAL HISTORY:   Social History     Tobacco Use     Smoking status: Former Smoker     Last attempt to quit: 1974     Years since quittin.5     Smokeless tobacco: Never Used     Tobacco comment: Quit     Substance Use Topics     Alcohol use: Yes     Alcohol/week: 4.0 standard drinks     Types: 4 Standard drinks or equivalent per week     Frequency: 2-4 times a month     PROBLEM LIST:   Patient Active Problem List    Diagnosis Date Noted     Acute right-sided low back pain with right-sided sciatica 10/27/2017     Priority: Medium     Triceps strain, initial encounter 10/27/2017     Priority: Medium     Essential hypertension with goal blood pressure less than 140/90 2016     Priority: Medium     Gastroesophageal reflux disease without esophagitis 2016     Priority: Medium     Type 2 diabetes mellitus without complications (H) 2015     Priority: Medium     Anxiety attack 2015     Priority: Medium     MICHELLE (obstructive sleep apnea) 2013     Priority: Medium     Advanced directives, counseling/discussion 2012     Priority: Medium     Discussed advance care planning with patient; however, patient declined at this time. 2012           HYPERLIPIDEMIA LDL GOAL <130 10/31/2010     Priority: Medium     Hearing loss      Priority: Medium     Problem list name updated by automated process. Provider to review       Inguinal hernia      Priority: Medium     Problem list name updated by automated process. Provider to review       Prostatitis      Priority: Medium     Problem list name updated by automated process. Provider to review       Pyelonephritis      Priority: Medium     Problem list name updated by automated process. Provider to review       MEDICATIONS:   Prescription Medications as of 2020       Rx Number Disp Refills Start End Last Dispensed Date Next Fill Date Owning Pharmacy    ACCU-CHEK SMARTVIEW test strip  100 strip 1 3/13/2020    [facility] [address] [place] [address] [address]    Sig: USE 1 STRIP TO CHECK GLUCOSE TWICE DAILY (VARY  THE  TIME  OF  DAY)    Class: E-Prescribe    aMILoride-hydrochlorothiazide (MODURETIC) 5-50 MG TABS per tablet  90 tablet 3 2020    [facility] - [address]    Sig: TAKE 1 TABLET EVERY DAY    Class: E-Prescribe    amLODIPine (NORVASC) 5 MG tablet  90 tablet 3 2020    [facility] - [address]    Sig: TAKE 1 TABLET EVERY DAY    Class: E-Prescribe    ASPIRIN 81 MG OR TABS  100 3 2006        Si tab po QD (Once per day)    Class: Historical    Route: Oral    atorvastatin (LIPITOR) 40 MG tablet  90 tablet 3 2020    [facility] - [address]    Sig: One tablet daily    Class: E-Prescribe    blood glucose monitoring (ACCU-CHEK FASTCLIX) lancets  250 each 3 5/10/2019    [facility] MAIL ORDER    Sig: Use 1 needle 2-3 times    Class: Local Print    Cholecalciferol (VITAMIN D PO)        [facility] [address] [place] [address] [address]    Sig: Take by mouth daily    Class: Historical    Route: Oral    metFORMIN (GLUCOPHAGE) 500 MG tablet   180 tablet 3 6/12/2020    [facility] [address]    Sig: Take 1 tablet (500 mg) by mouth 2 times daily (with meals)    Class: E-Prescribe    Route: Oral    metoprolol tartrate (LOPRESSOR) 50 MG tablet  270 tablet 3 3/24/2020    [facility] [address]    Sig: TAKE 2 TABLETS IN THE MORNING  AND TAKE 1 TABLET EVERY NIGHT    Class: E-Prescribe    omeprazole (PRILOSEC) 20 MG DR capsule  90 capsule 3 11/13/2019    [facility] [place] [id number]    Sig: TAKE 1 CAPSULE EVERY DAY    Class: E-Prescribe    order for DME            Sig: Equipment ordered: RESMED Auto PAP Mask type: Full face  Settings: 8-15 cm h2o    Class: Historical    potassium chloride ER (KLOR-CON M) 20 MEQ CR tablet  270 tablet 1 6/11/2020    [facility] [address]    Sig: TAKE 1 TABLET THREE TIMES DAILY  (DUE  FOR  LAB  CHECK  BEFORE  NEXT REFILL)    Class: E-Prescribe        ALLERGIES: Sulfa drugs  VITALS: There were no vitals taken for this visit.    ROS:  Skin: negative  Eyes: negative  Ears/Nose/Throat: negative  Respiratory: No shortness of breath, dyspnea on exertion, cough, or hemoptysis  Cardiovascular: hypertension, hyperlipidemia  Gastrointestinal: GERD  Genitourinary: negative  Musculoskeletal: negative  Neurologic: negative  Psychiatric: negative  Hematologic/Lymphatic/Immunologic: enlarged hypermetabolic right inguinal lymph node, not painful  Endocrine: diabetes      Physical Examination: virtual visit  Pleasant, oriented    BMP RESULTS:  Lab Results   Component Value Date     07/17/2019    POTASSIUM 3.4 07/17/2019    CHLORIDE 97 07/17/2019    CO2 34 (H) 07/17/2019    ANIONGAP 5 07/17/2019     (H) 07/17/2019    BUN 20 07/17/2019    CR 1.02 07/17/2019    GFRESTIMATED 65 07/03/2020    GFRESTBLACK 79 07/03/2020    ANGELA 8.6 07/17/2019        CBC RESULTS:  Lab  Results   Component Value Date    HGB 14.5 06/29/2015       INR/PTT:  No results found for: INR, PTT    Diagnostic studies: see PET scan    PROVIDER NOTE:  I explained the procedure to Philipp and his wife, Mary.  This included:  Preparing for the procedure, the actual procedure and recovery.  I explained the risks of the lymph node biopsy:  Bleeding, infection, hitting an unintended organ (vessel or nerve)  I explained that usually the results return after two to four business days.    I explained that he/she would be contacted by Dr. Taylor's office following this to determine a future plan.  Thank you for involving us in the care of your patient.    Hannah Mancilla MS, APRN, CNS, CRN  Clinical Nurse Specialist  Interventional Radiology  [phone] (voice mail)  [phone] (pager)    CC  Patient Care Team:  Zaid Oneill MD as PCP - General  Zaid Oneill MD as Assigned PCP  JOSH TAYLOR

## 2020-07-07 NOTE — Clinical Note
"    7/7/2020         RE: Merlin J Koppendrayer  [address] [facility] [address]        Dear Colleague,    Thank you for referring your patient, Merlin J Koppendrayer, to the [facility]. Please see a copy of my visit note below.    Merlin J Koppendrayer is a 79 year old male who is being evaluated via a billable video visit.      The patient has been notified of following:     \"This video visit will be conducted via a call between you and your physician/provider. We have found that certain health care needs can be provided without the need for an in-person physical exam.  This service lets us provide the care you need with a video conversation.  If a prescription is necessary we can send it directly to your pharmacy.  If lab work is needed we can place an order for that and you can then stop by our lab to have the test done at a later time.    Video visits are billed at different rates depending on your insurance coverage.  Please reach out to your insurance provider with any questions.    If during the course of the call the physician/provider feels a video visit is not appropriate, you will not be charged for this service.\"    Patient has given verbal consent for Video visit? Yes  How would you like to [facility] your [facility]  Patient would like the video invitation sent by: Other e-mail: My Chart  Will anyone else be joining your video visit? No  {If patient encounters technical issues they should call [phone]}    Shweta Simmons LPN      Video-Visit Details    Type of service:  Video Visit    Video Start Time: 4:02 PM  Video End Time: 4:44 PM    Originating Location (pt. Location): Home    Distant Location (provider location):  [facility]     Platform used for Video Visit: AmWell then switched to Doximity due to technical issues    NEW SURGICAL CONSULTATION  Jul 7, 2020    Merlin J Koppendrayer is a 79 year old male who presents with a mass in the right groin.  He " was referred by Dr Giraldo.    HPI:    He noted a right groin mass since June 2020.  He denies any overlying skin changes; tenderness; swelling, numbness or weakness in the ipsilateral leg.     He has a history of a 0.8 mm deep melanoma without ulceration or mitoses of the RIGHT 2nd and 3rd toe web space, diagnosed in July 2015.  He underwent a transmetatarsal amputation of the right 2nd and 3rd toes on 7/31/2015 without residual melanoma on surgical pathology.  No sentinel lymph node biopsy was performed.  He has tolerable phantom pain intermittently at the site of the amputation.      He denies noting any other masses, such as behind the right knee, or in the contralateral lower extremity, or in the neck or axillae bilaterally.     MELANOMA-SPECIFIC HISTORY:  Prior skin cancer: Yes - melanoma in 2015; no other skin cancers.  Works indoors/outdoors: Outdoors - mows lawns; retired .  Uses sunscreen    A skin check has not yet been performed.    FAMILY HISTORY:  Pancreatic ca: No  Melanoma: No  Breast ca: No  Other cancer: Yes - paternal uncle with colon    Past Medical History:   Diagnosis Date     Amblyopia of eye, left      Bilateral cataracts 2016     Diabetes (H)      Diabetic eye exam (H) 12/17/14     Esophageal reflux      Hypersomnia with sleep apnea, unspecified      Inguinal hernia without mention of obstruction or gangrene, unilateral or unspecified, (not specified as recurrent)     s/p repair     Melanoma (H) 07/10/2015    web space between 2 & 3 toe, bx to periphery of margin.  Amputation of 2&3 toe, margins clear     Mixed hyperlipidemia      Other isolated or specific phobias     claustrophobia     Presbyopia      Prostatitis, unspecified      Pyelonephritis, unspecified      Unspecified essential hypertension      Unspecified hearing loss    , usually 120-130  No MI, CVA    Past Surgical History:   Procedure Laterality Date     AMPUTATE TOE(S) Right 7/31/2015    Procedure: AMPUTATE  TOE(S);  Surgeon: Neal Slater MD;  Location: [facility] OR     C LAP,HERNIA REPAIR PROC,UNLIST       cataract extraction & yag laser capsulotomy Bilateral 2016     COLONOSCOPY  04/15/09     COLONOSCOPY N/A 11/1/2017    Procedure: COLONOSCOPY;  Colonoscopy;  Surgeon: Chapin Rodriguez MD;  Location: [facility] GI     EXCISE MASS FOOT Right 7/10/2015    Procedure: EXCISE MASS FOOT;  Surgeon: Nickolas Farah DPM;  Location: [facility] OR     REPAIR SYNDACTYLY FOOT Right 7/10/2015    Procedure: REPAIR SYNDACTYLY FOOT;  Surgeon: Nickolas Farah DPM;  Location: [facility] OR   No GA issues    Current Outpatient Medications   Medication Sig Dispense Refill     ACCU-CHEK SMARTVIEW test strip USE 1 STRIP TO CHECK GLUCOSE TWICE DAILY (VARY  THE  TIME  OF  DAY) 100 strip 1     aMILoride-hydrochlorothiazide (MODURETIC) 5-50 MG TABS per tablet TAKE 1 TABLET EVERY DAY 90 tablet 3     amLODIPine (NORVASC) 5 MG tablet TAKE 1 TABLET EVERY DAY 90 tablet 3     ASPIRIN 81 MG OR TABS 1 tab po QD (Once per day) 100 3     atorvastatin (LIPITOR) 40 MG tablet One tablet daily 90 tablet 3     blood glucose monitoring (ACCU-CHEK FASTCLIX) lancets Use 1 needle 2-3 times 250 each 3     Cholecalciferol (VITAMIN D PO) Take by mouth daily       metFORMIN (GLUCOPHAGE) 500 MG tablet Take 1 tablet (500 mg) by mouth 2 times daily (with meals) 180 tablet 3     metoprolol tartrate (LOPRESSOR) 50 MG tablet TAKE 2 TABLETS IN THE MORNING  AND TAKE 1 TABLET EVERY NIGHT 270 tablet 3     omeprazole (PRILOSEC) 20 MG DR capsule TAKE 1 CAPSULE EVERY DAY 90 capsule 3     order for DME Equipment ordered: RESMED Auto PAP Mask type: Full face  Settings: 8-15 cm h2o       potassium chloride ER (KLOR-CON M) 20 MEQ CR tablet TAKE 1 TABLET THREE TIMES DAILY  (DUE  FOR  LAB  CHECK  BEFORE  NEXT REFILL) 270 tablet 1           Allergies   Allergen Reactions     Sulfa Drugs      rash      SOCIAL HISTORY:  Smokes: No - quit 1976  EtOH: Yes 1 every 2 weeks  Illicit drugs:  No    ROS  Headaches No  Vision changes No  Dizziness No  Abdominal pain No   Diarrhea - intermittent with metformin  Rectal bleeding No  Melena No   Last colonoscopy in 2017 per patient - was normal  Unintentional weight loss No    Physical Exam  Constitutional:       General: He is not in acute distress.     Appearance: Normal appearance.      Comments: The rest of a comprehensive physical examination is deferred due to public health emergency video visit restrictions.   Pulmonary:      Effort: No respiratory distress.   Neurological:      Mental Status: He is alert and oriented to person, place, and time.          INVESTIGATIONS:    PET/CT (7/3/2020) showed:  FINDINGS:   HEAD/NECK:  There is no suspicious FDG uptake in the neck.   Mucosal thickening of the right maxillary sinus, otherwise the paranasal sinuses are clear. The mastoid air cells are clear.   The mucosal pharyngeal space, the , prevertebral and carotid spaces are within normal limits.   No masses, mass effect or pathologically enlarged lymph nodes are evident. Hypodense nonhypermetabolic 1.1 cm left thyroid nodule.  CHEST:  There is no suspicious FDG uptake in the chest.   No pulmonary parenchymal consolidation. No pleural effusion or pneumothorax. Bilateral pleural plaques. Dependent atelectasis. No suspicious pulmonary nodules. Multiple sub-4 mm nodules scattered throughout the lungs, some of which are partially calcified, similar to prior. The central tracheobronchial tree is patent. Heart size is normal. No pericardial effusion. Normal caliber of the aorta and main pulmonary artery. Normal branching pattern of the great vessels. No pathologically enlarged mediastinal, hilar, or axillary lymphadenopathy. Normal esophagus.  ABDOMEN AND PELVIS:  Hypermetabolic right inguinal lymph node measuring 15 mm with max SUV 12.5. Focal hypermetabolic activity in the rectum 6 cm from anal verge  with max SUV 7.1, with mild wall thickening on CT.  Additional focus of hypermetabolic activity in the distal small bowel with Max SUV 10.7 correlating with short segment wall thickening seen on CT (series 5 image 371). Decompressed loops of bowel in the right side of the pelvis. Mildly dilated small bowel loops in the low mid abdomen. Normal caliber of the colon. Normal appendix.  There are no suspicious hepatic lesions. There is no splenomegaly or evidence for splenic or pancreatic mass lesion. There are no suspicious adrenal mass lesions or opaque gallbladder calculi.  There is symmetric nephrographic renal phase without hydronephrosis. The bladder is distended and unremarkable. Prostatomegaly.  No free air or free fluid in the abdomen or pelvis. Normal caliber of the infrarenal aorta. Patent portal vein. No pathologically enlarged mesenteric or retroperitoneal lymph nodes.  LOWER EXTREMITIES:   No abnormal masses or hypermetabolic lesions.  BONES:   There are no suspicious lytic or blastic osseous lesions.  There is no abnormal FDG uptake in the skeleton. Degenerative changes of the spine.  IMPRESSION:   In this patient with history of melanoma:  1. There is a hypermetabolic right inguinal lymph node, suspicious for metastatic disease. Recommend tissue sampling.  2. Focal hypermetabolic uptake in the distal small bowel with associated bowel wall thickening, likely infectious/inflammatory in nature.  3. Hypermetabolic uptake in the rectum approximately 6 cm from the liver which with CT soft tissue thickening, differential peristalsis versus true lesion. Consider colonoscopy (Negative Colonoscopy 2017).      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    I reviewed the imaging findings with Merlin J Koppendrayer and his wife. We discussed management options for regionally recurrent melanoma, including surgery and immunotherapy.  We reviewed options include upfront  immunotherapy versus upfront surgery.  I have recommended a needle biopsy to establish the diagnosis first; I have personally communicated this to the Interventional Radiology team today.  I have also recommended a consultation with Dr Mayes of Medical Oncology to further discuss immunotherapy.     Regarding the rectal findings on PET/CT, I recommended endoscopy.  We will likely follow the small bowel findings on subsequent PET/CTs.    For surgical management of regionally recurrent melanoma, I recommended a superficial inguinofemoral lymph node dissection.  In the absence of distant disease, an inguinal lymph node dissection would likely offer the most benefit for locoregional control.  We reviewed that depending on the pathology, adjuvant radiation may be indicated.  We reviewed the risks and benefits of an inguinal lymph node dissection, including bleeding, wound infection, wound dehiscence, seroma formation, and lymphedema.  With his history of diabetes and a HbA1c >7, he likely will have wound complications.    We reviewed that patients with a diagnosis of melanoma are at risk of recurrence (local and distant) and of subsequent de anabell melanoma.  I also reviewed the importance of protection from sun exposure, including wearing long sleeves, hats, etc and also the use of sunscreen with SPF of at least 35, with frequent re-applications. I recommended a dermatology evaluation as well for a full skin check.    All of the above were discussed with the patient and all questions answered.  Merlin J Koppendrayer elected to proceed with needle biopsy of the right groin lymph node.   I will follow up with him after the biopsy to further discuss the next steps.    Total time spent with the patient was 45 minutes, of which 75% was counseling.     PLAN:  1. Needle biopsy of the right groin lymph node by Interventional Radiology  2. Medical oncology referral - will discuss upfront immunotherapy versus upfront  surgery  3. Dermatology referral for full skin check  4. Will need a RIGHT superficial inguinal lymph node dissection  5. Gastroenterology referral for endoscopy for rectal PET/CT findings    Eli Keller MD MSc [facility] FACS    Division of Surgical Oncology  [facility]       Again, thank you for allowing me to participate in the care of your patient.        Sincerely,        Eli Keller MD

## 2020-07-07 NOTE — LETTER
"2020      RE: Merlin J Koppdebbie  [address] [facility] [address]     2020    Zaid Oneill MD   [address] [facility] [address]    RE: Merlin J Koppendrayer  (: 1940)    Dear Dr. Zaid Oneill    Your patient was seen for evaluation in my office.  Please find a copy of my notes for your record and review.  If you have any further questions, please feel free to contact my office.   Thank you for your kind referral.    Sincerely,   Eli Keller MD MSc [facility] FACS    ---   Merlin J Koppendrayer is a 79 year old male who is being evaluated via a billable video visit.      The patient has been notified of following:     \"This video visit will be conducted via a call between you and your physician/provider. We have found that certain health care needs can be provided without the need for an in-person physical exam.  This service lets us provide the care you need with a video conversation.  If a prescription is necessary we can send it directly to your pharmacy.  If lab work is needed we can place an order for that and you can then stop by our lab to have the test done at a later time.    Video visits are billed at different rates depending on your insurance coverage.  Please reach out to your insurance provider with any questions.    If during the course of the call the physician/provider feels a video visit is not appropriate, you will not be charged for this service.\"    Patient has given verbal consent for Video visit? Yes  How would you like to obtain your AVS? [place]  Patient would like the video invitation sent by: Other e-mail: My Chart  Will anyone else be joining your video visit? No      Shweta Simmons LPN      Video-Visit Details    Type of service:  Video Visit    Video Start Time: 4:02 PM  Video End Time: 4:44 PM    Originating Location (pt. Location): Home    Distant Location (provider location):  [facility]     Platform used for Video Visit: Edgar samson " switched to Doximity due to technical issues    NEW SURGICAL CONSULTATION  Jul 7, 2020    Merlin J Koppendrayer is a 79 year old male who presents with a mass in the right groin.  He was referred by Dr Giraldo.    HPI:    He noted a right groin mass since June 2020.  He denies any overlying skin changes; tenderness; swelling, numbness or weakness in the ipsilateral leg.     He has a history of a 0.8 mm deep melanoma without ulceration or mitoses of the RIGHT 2nd and 3rd toe web space, diagnosed in July 2015.  He underwent a transmetatarsal amputation of the right 2nd and 3rd toes on 7/31/2015 without residual melanoma on surgical pathology.  No sentinel lymph node biopsy was performed.  He has tolerable phantom pain intermittently at the site of the amputation.      He denies noting any other masses, such as behind the right knee, or in the contralateral lower extremity, or in the neck or axillae bilaterally.     MELANOMA-SPECIFIC HISTORY:  Prior skin cancer: Yes - melanoma in 2015; no other skin cancers.  Works indoors/outdoors: Outdoors - mows lawns; retired .  Uses sunscreen    A skin check has not yet been performed.    FAMILY HISTORY:  Pancreatic ca: No  Melanoma: No  Breast ca: No  Other cancer: Yes - paternal uncle with colon    Past Medical History:   Diagnosis Date     Amblyopia of eye, left      Bilateral cataracts 2016     Diabetes (H)      Diabetic eye exam (H) 12/17/14     Esophageal reflux      Hypersomnia with sleep apnea, unspecified      Inguinal hernia without mention of obstruction or gangrene, unilateral or unspecified, (not specified as recurrent)     s/p repair     Melanoma (H) 07/10/2015    web space between 2 & 3 toe, bx to periphery of margin.  Amputation of 2&3 toe, margins clear     Mixed hyperlipidemia      Other isolated or specific phobias     claustrophobia     Presbyopia      Prostatitis, unspecified      Pyelonephritis, unspecified      Unspecified essential  hypertension      Unspecified hearing loss    , usually 120-130  No MI, CVA    Past Surgical History:   Procedure Laterality Date     AMPUTATE TOE(S) Right 7/31/2015    Procedure: AMPUTATE TOE(S);  Surgeon: Neal Slater MD;  [facility]     C LAP,HERNIA REPAIR PROC,UNLIST       cataract extraction & yag laser capsulotomy Bilateral 2016     COLONOSCOPY  04/15/09     COLONOSCOPY N/A 11/1/2017    Procedure: COLONOSCOPY;  Colonoscopy;  Surgeon: Chapin Rodriguez MD;  Location: [facility] GI     EXCISE MASS FOOT Right 7/10/2015    Procedure: EXCISE MASS FOOT;  Surgeon: Nickolas Farah DPM;  Location: [facility] OR     REPAIR SYNDACTYLY FOOT Right 7/10/2015    Procedure: REPAIR SYNDACTYLY FOOT;  Surgeon: Nickolas Farah DPM;  Location: [facility] OR   No GA issues    Current Outpatient Medications   Medication Sig Dispense Refill     ACCU-CHEK SMARTVIEW test strip USE 1 STRIP TO CHECK GLUCOSE TWICE DAILY (VARY  THE  TIME  OF  DAY) 100 strip 1     aMILoride-hydrochlorothiazide (MODURETIC) 5-50 MG TABS per tablet TAKE 1 TABLET EVERY DAY 90 tablet 3     amLODIPine (NORVASC) 5 MG tablet TAKE 1 TABLET EVERY DAY 90 tablet 3     ASPIRIN 81 MG OR TABS 1 tab po QD (Once per day) 100 3     atorvastatin (LIPITOR) 40 MG tablet One tablet daily 90 tablet 3     blood glucose monitoring (ACCU-CHEK FASTCLIX) lancets Use 1 needle 2-3 times 250 each 3     Cholecalciferol (VITAMIN D PO) Take by mouth daily       metFORMIN (GLUCOPHAGE) 500 MG tablet Take 1 tablet (500 mg) by mouth 2 times daily (with meals) 180 tablet 3     metoprolol tartrate (LOPRESSOR) 50 MG tablet TAKE 2 TABLETS IN THE MORNING  AND TAKE 1 TABLET EVERY NIGHT 270 tablet 3     omeprazole (PRILOSEC) 20 MG DR capsule TAKE 1 CAPSULE EVERY DAY 90 capsule 3     order for DME Equipment ordered: RESMED Auto PAP Mask type: Full face  Settings: 8-15 cm h2o       potassium chloride ER (KLOR-CON M) 20 MEQ CR tablet TAKE 1 TABLET THREE TIMES DAILY  (DUE  FOR  LAB  CHECK  BEFORE   NEXT REFILL) 270 tablet 1           Allergies   Allergen Reactions     Sulfa Drugs      rash      SOCIAL HISTORY:  Smokes: No - quit 1976  EtOH: Yes 1 every 2 weeks  Illicit drugs: No    ROS  Headaches No  Vision changes No  Dizziness No  Abdominal pain No   Diarrhea - intermittent with metformin  Rectal bleeding No  Melena No   Last colonoscopy in 2017 per patient - was normal  Unintentional weight loss No    Physical Exam  Constitutional:       General: He is not in acute distress.     Appearance: Normal appearance.      Comments: The rest of a comprehensive physical examination is deferred due to public health emergency video visit restrictions.   Pulmonary:      Effort: No respiratory distress.   Neurological:      Mental Status: He is alert and oriented to person, place, and time.          INVESTIGATIONS:    PET/CT (7/3/2020) showed:  FINDINGS:   HEAD/NECK:  There is no suspicious FDG uptake in the neck.   Mucosal thickening of the right maxillary sinus, otherwise the paranasal sinuses are clear. The mastoid air cells are clear.   The mucosal pharyngeal space, the , prevertebral and carotid spaces are within normal limits.   No masses, mass effect or pathologically enlarged lymph nodes are evident. Hypodense nonhypermetabolic 1.1 cm left thyroid nodule.  CHEST:  There is no suspicious FDG uptake in the chest.   No pulmonary parenchymal consolidation. No pleural effusion or pneumothorax. Bilateral pleural plaques. Dependent atelectasis. No suspicious pulmonary nodules. Multiple sub-4 mm nodules scattered throughout the lungs, some of which are partially calcified, similar to prior. The central tracheobronchial tree is patent. Heart size is normal. No pericardial effusion. Normal caliber of the aorta and main pulmonary artery. Normal branching pattern of the great vessels. No pathologically enlarged mediastinal, hilar, or axillary lymphadenopathy. Normal esophagus.  ABDOMEN AND PELVIS:  Hypermetabolic  right inguinal lymph node measuring 15 mm with max SUV 12.5. Focal hypermetabolic activity in the rectum 6 cm from anal verge  with max SUV 7.1, with mild wall thickening on CT. Additional focus of hypermetabolic activity in the distal small bowel with Max SUV 10.7 correlating with short segment wall thickening seen on CT (series 5 image 371). Decompressed loops of bowel in the right side of the pelvis. Mildly dilated small bowel loops in the low mid abdomen. Normal caliber of the colon. Normal appendix.  There are no suspicious hepatic lesions. There is no splenomegaly or evidence for splenic or pancreatic mass lesion. There are no suspicious adrenal mass lesions or opaque gallbladder calculi.  There is symmetric nephrographic renal phase without hydronephrosis. The bladder is distended and unremarkable. Prostatomegaly.  No free air or free fluid in the abdomen or pelvis. Normal caliber of the infrarenal aorta. Patent portal vein. No pathologically enlarged mesenteric or retroperitoneal lymph nodes.  LOWER EXTREMITIES:   No abnormal masses or hypermetabolic lesions.  BONES:   There are no suspicious lytic or blastic osseous lesions.  There is no abnormal FDG uptake in the skeleton. Degenerative changes of the spine.  IMPRESSION:   In this patient with history of melanoma:  1. There is a hypermetabolic right inguinal lymph node, suspicious for metastatic disease. Recommend tissue sampling.  2. Focal hypermetabolic uptake in the distal small bowel with associated bowel wall thickening, likely infectious/inflammatory in nature.  3. Hypermetabolic uptake in the rectum approximately 6 cm from the liver which with CT soft tissue thickening, differential peristalsis versus true lesion. Consider colonoscopy (Negative Colonoscopy 2017).      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    I reviewed the imaging findings with Merlin J  Charity and his wife. We discussed management options for regionally recurrent melanoma, including surgery and immunotherapy.  We reviewed options include upfront immunotherapy versus upfront surgery.  I have recommended a needle biopsy to establish the diagnosis first; I have personally communicated this to the Interventional Radiology team today.  I have also recommended a consultation with Dr Mayes of Medical Oncology to further discuss immunotherapy.     Regarding the rectal findings on PET/CT, I recommended endoscopy.  We will likely follow the small bowel findings on subsequent PET/CTs.    For surgical management of regionally recurrent melanoma, I recommended a superficial inguinofemoral lymph node dissection.  In the absence of distant disease, an inguinal lymph node dissection would likely offer the most benefit for locoregional control.  We reviewed that depending on the pathology, adjuvant radiation may be indicated.  We reviewed the risks and benefits of an inguinal lymph node dissection, including bleeding, wound infection, wound dehiscence, seroma formation, and lymphedema.  With his history of diabetes and a HbA1c >7, he likely will have wound complications.    We reviewed that patients with a diagnosis of melanoma are at risk of recurrence (local and distant) and of subsequent de anabell melanoma.  I also reviewed the importance of protection from sun exposure, including wearing long sleeves, hats, etc and also the use of sunscreen with SPF of at least 35, with frequent re-applications. I recommended a dermatology evaluation as well for a full skin check.    All of the above were discussed with the patient and all questions answered.  Merlin J Koppendrayer elected to proceed with needle biopsy of the right groin lymph node.   I will follow up with him after the biopsy to further discuss the next steps.    Total time spent with the patient was 45 minutes, of which 75% was counseling.      PLAN:  1. Needle biopsy of the right groin lymph node by Interventional Radiology  2. Medical oncology referral - will discuss upfront immunotherapy versus upfront surgery  3. Dermatology referral for full skin check  4. Will need a RIGHT superficial inguinal lymph node dissection  5. Gastroenterology referral for endoscopy for rectal PET/CT findings    Eli Keller MD MSc [facility] FACS    Division of Surgical Oncology  [facility]

## 2020-07-07 NOTE — PROGRESS NOTES
"Merlin J Koppendrayer is a 79 year old male who is being evaluated via a billable video visit.      The patient has been notified of following:     \"This video visit will be conducted via a call between you and your physician/provider. We have found that certain health care needs can be provided without the need for an in-person physical exam.  This service lets us provide the care you need with a video conversation.  If a prescription is necessary we can send it directly to your pharmacy.  If lab work is needed we can place an order for that and you can then stop by our lab to have the test done at a later time.    Video visits are billed at different rates depending on your insurance coverage.  Please reach out to your insurance provider with any questions.    If during the course of the call the physician/provider feels a video visit is not appropriate, you will not be charged for this service.\"    Patient has given verbal consent for Video visit? Yes  How would you like to obtain your AVS? [place]  Patient would like the video invitation sent by: Other e-mail: My Chart  Will anyone else be joining your video visit? No      Shweta Simmons LPN      Video-Visit Details    Type of service:  Video Visit    Video Start Time: 4:02 PM  Video End Time: 4:44 PM    Originating Location (pt. Location): Home    Distant Location (provider location):  [facility]     Platform used for Video Visit: Edgar then switched to eTruck due to technical issues    NEW SURGICAL CONSULTATION  Jul 7, 2020    Merlin J Koppendrayer is a 79 year old male who presents with a mass in the right groin.  He was referred by Dr Giraldo.    HPI:    He noted a right groin mass since June 2020.  He denies any overlying skin changes; tenderness; swelling, numbness or weakness in the ipsilateral leg.     He has a history of a 0.8 mm deep melanoma without ulceration or mitoses of the RIGHT 2nd and 3rd toe web space, diagnosed in July " 2015.  He underwent a transmetatarsal amputation of the right 2nd and 3rd toes on 7/31/2015 without residual melanoma on surgical pathology.  No sentinel lymph node biopsy was performed.  He has tolerable phantom pain intermittently at the site of the amputation.      He denies noting any other masses, such as behind the right knee, or in the contralateral lower extremity, or in the neck or axillae bilaterally.     MELANOMA-SPECIFIC HISTORY:  Prior skin cancer: Yes - melanoma in 2015; no other skin cancers.  Works indoors/outdoors: Outdoors - mows lawns; retired .  Uses sunscreen    A skin check has not yet been performed.    FAMILY HISTORY:  Pancreatic ca: No  Melanoma: No  Breast ca: No  Other cancer: Yes - paternal uncle with colon    Past Medical History:   Diagnosis Date     Amblyopia of eye, left      Bilateral cataracts 2016     Diabetes (H)      Diabetic eye exam (H) 12/17/14     Esophageal reflux      Hypersomnia with sleep apnea, unspecified      Inguinal hernia without mention of obstruction or gangrene, unilateral or unspecified, (not specified as recurrent)     s/p repair     Melanoma (H) 07/10/2015    web space between 2 & 3 toe, bx to periphery of margin.  Amputation of 2&3 toe, margins clear     Mixed hyperlipidemia      Other isolated or specific phobias     claustrophobia     Presbyopia      Prostatitis, unspecified      Pyelonephritis, unspecified      Unspecified essential hypertension      Unspecified hearing loss    , usually 120-130  No MI, CVA    Past Surgical History:   Procedure Laterality Date     AMPUTATE TOE(S) Right 7/31/2015    Procedure: AMPUTATE TOE(S);  Surgeon: Neal Slater MD;  Location: [facility] OR     C LAP,HERNIA REPAIR PROC,UNLIST       cataract extraction & yag laser capsulotomy Bilateral 2016     COLONOSCOPY  04/15/09     COLONOSCOPY N/A 11/1/2017    Procedure: COLONOSCOPY;  Colonoscopy;  Surgeon: Chapin Rodriguez MD;  Location:  GI     EXCISE MASS  FOOT Right 7/10/2015    Procedure: EXCISE MASS FOOT;  Surgeon: Nickolas Farah DPM;  Location: [facility] OR     REPAIR SYNDACTYLY FOOT Right 7/10/2015    Procedure: REPAIR SYNDACTYLY FOOT;  Surgeon: Nickolas Farah DPM;  Location: [facility] OR   No GA issues    Current Outpatient Medications   Medication Sig Dispense Refill     ACCU-CHEK SMARTVIEW test strip USE 1 STRIP TO CHECK GLUCOSE TWICE DAILY (VARY  THE  TIME  OF  DAY) 100 strip 1     aMILoride-hydrochlorothiazide (MODURETIC) 5-50 MG TABS per tablet TAKE 1 TABLET EVERY DAY 90 tablet 3     amLODIPine (NORVASC) 5 MG tablet TAKE 1 TABLET EVERY DAY 90 tablet 3     ASPIRIN 81 MG OR TABS 1 tab po QD (Once per day) 100 3     atorvastatin (LIPITOR) 40 MG tablet One tablet daily 90 tablet 3     blood glucose monitoring (ACCU-CHEK FASTCLIX) lancets Use 1 needle 2-3 times 250 each 3     Cholecalciferol (VITAMIN D PO) Take by mouth daily       metFORMIN (GLUCOPHAGE) 500 MG tablet Take 1 tablet (500 mg) by mouth 2 times daily (with meals) 180 tablet 3     metoprolol tartrate (LOPRESSOR) 50 MG tablet TAKE 2 TABLETS IN THE MORNING  AND TAKE 1 TABLET EVERY NIGHT 270 tablet 3     omeprazole (PRILOSEC) 20 MG DR capsule TAKE 1 CAPSULE EVERY DAY 90 capsule 3     order for DME Equipment ordered: RESMED Auto PAP Mask type: Full face  Settings: 8-15 cm h2o       potassium chloride ER (KLOR-CON M) 20 MEQ CR tablet TAKE 1 TABLET THREE TIMES DAILY  (DUE  FOR  LAB  CHECK  BEFORE  NEXT REFILL) 270 tablet 1           Allergies   Allergen Reactions     Sulfa Drugs      rash      SOCIAL HISTORY:  Smokes: No - quit 1976  EtOH: Yes 1 every 2 weeks  Illicit drugs: No    ROS  Headaches No  Vision changes No  Dizziness No  Abdominal pain No   Diarrhea - intermittent with metformin  Rectal bleeding No  Melena No   Last colonoscopy in 2017 per patient - was normal  Unintentional weight loss No    Physical Exam  Constitutional:       General: He is not in acute distress.     Appearance: Normal  appearance.      Comments: The rest of a comprehensive physical examination is deferred due to public health emergency video visit restrictions.   Pulmonary:      Effort: No respiratory distress.   Neurological:      Mental Status: He is alert and oriented to person, place, and time.          INVESTIGATIONS:    PET/CT (7/3/2020) showed:  FINDINGS:   HEAD/NECK:  There is no suspicious FDG uptake in the neck.   Mucosal thickening of the right maxillary sinus, otherwise the paranasal sinuses are clear. The mastoid air cells are clear.   The mucosal pharyngeal space, the , prevertebral and carotid spaces are within normal limits.   No masses, mass effect or pathologically enlarged lymph nodes are evident. Hypodense nonhypermetabolic 1.1 cm left thyroid nodule.  CHEST:  There is no suspicious FDG uptake in the chest.   No pulmonary parenchymal consolidation. No pleural effusion or pneumothorax. Bilateral pleural plaques. Dependent atelectasis. No suspicious pulmonary nodules. Multiple sub-4 mm nodules scattered throughout the lungs, some of which are partially calcified, similar to prior. The central tracheobronchial tree is patent. Heart size is normal. No pericardial effusion. Normal caliber of the aorta and main pulmonary artery. Normal branching pattern of the great vessels. No pathologically enlarged mediastinal, hilar, or axillary lymphadenopathy. Normal esophagus.  ABDOMEN AND PELVIS:  Hypermetabolic right inguinal lymph node measuring 15 mm with max SUV 12.5. Focal hypermetabolic activity in the rectum 6 cm from anal verge  with max SUV 7.1, with mild wall thickening on CT. Additional focus of hypermetabolic activity in the distal small bowel with Max SUV 10.7 correlating with short segment wall thickening seen on CT (series 5 image 371). Decompressed loops of bowel in the right side of the pelvis. Mildly dilated small bowel loops in the low mid abdomen. Normal caliber of the colon. Normal  appendix.  There are no suspicious hepatic lesions. There is no splenomegaly or evidence for splenic or pancreatic mass lesion. There are no suspicious adrenal mass lesions or opaque gallbladder calculi.  There is symmetric nephrographic renal phase without hydronephrosis. The bladder is distended and unremarkable. Prostatomegaly.  No free air or free fluid in the abdomen or pelvis. Normal caliber of the infrarenal aorta. Patent portal vein. No pathologically enlarged mesenteric or retroperitoneal lymph nodes.  LOWER EXTREMITIES:   No abnormal masses or hypermetabolic lesions.  BONES:   There are no suspicious lytic or blastic osseous lesions.  There is no abnormal FDG uptake in the skeleton. Degenerative changes of the spine.  IMPRESSION:   In this patient with history of melanoma:  1. There is a hypermetabolic right inguinal lymph node, suspicious for metastatic disease. Recommend tissue sampling.  2. Focal hypermetabolic uptake in the distal small bowel with associated bowel wall thickening, likely infectious/inflammatory in nature.  3. Hypermetabolic uptake in the rectum approximately 6 cm from the liver which with CT soft tissue thickening, differential peristalsis versus true lesion. Consider colonoscopy (Negative Colonoscopy 2017).      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    I reviewed the imaging findings with Merlin J Koppendrayer and his wife. We discussed management options for regionally recurrent melanoma, including surgery and immunotherapy.  We reviewed options include upfront immunotherapy versus upfront surgery.  I have recommended a needle biopsy to establish the diagnosis first; I have personally communicated this to the Interventional Radiology team today.  I have also recommended a consultation with Dr Mayes of Medical Oncology to further discuss immunotherapy.     Regarding the rectal  findings on PET/CT, I recommended endoscopy.  We will likely follow the small bowel findings on subsequent PET/CTs.    For surgical management of regionally recurrent melanoma, I recommended a superficial inguinofemoral lymph node dissection.  In the absence of distant disease, an inguinal lymph node dissection would likely offer the most benefit for locoregional control.  We reviewed that depending on the pathology, adjuvant radiation may be indicated.  We reviewed the risks and benefits of an inguinal lymph node dissection, including bleeding, wound infection, wound dehiscence, seroma formation, and lymphedema.  With his history of diabetes and a HbA1c >7, he likely will have wound complications.    We reviewed that patients with a diagnosis of melanoma are at risk of recurrence (local and distant) and of subsequent de anabell melanoma.  I also reviewed the importance of protection from sun exposure, including wearing long sleeves, hats, etc and also the use of sunscreen with SPF of at least 35, with frequent re-applications. I recommended a dermatology evaluation as well for a full skin check.    All of the above were discussed with the patient and all questions answered.  Merlin J Koppendrayer elected to proceed with needle biopsy of the right groin lymph node.   I will follow up with him after the biopsy to further discuss the next steps.    Total time spent with the patient was 45 minutes, of which 75% was counseling.     PLAN:  1. Needle biopsy of the right groin lymph node by Interventional Radiology  2. Medical oncology referral - will discuss upfront immunotherapy versus upfront surgery  3. Dermatology referral for full skin check  4. Will need a RIGHT superficial inguinal lymph node dissection  5. Gastroenterology referral for endoscopy for rectal PET/CT findings    Eli Keller MD MSc [facility] FACS    Division of Surgical Oncology  [facility]

## 2020-07-07 NOTE — LETTER
"2020      RE: Merlin J Kosubhash  [address] [address]     2020    Michael Giraldo MD  [address] [place] [address]    RE: Merlin J Komiahjuvenal  (: 1940)    Dear Dr. Giraldo     Your patient was seen for evaluation in my office.  Please find a copy of my notes for your record and review.  If you have any further questions, please feel free to contact my office.   Thank you for your kind referral.    Sincerely,   Eli Keller MD MSc [facility] FACS    ---       Merlin J Koppendrayer is a 79 year old male who is being evaluated via a billable video visit.      The patient has been notified of following:     \"This video visit will be conducted via a call between you and your physician/provider. We have found that certain health care needs can be provided without the need for an in-person physical exam.  This service lets us provide the care you need with a video conversation.  If a prescription is necessary we can send it directly to your pharmacy.  If lab work is needed we can place an order for that and you can then stop by our lab to have the test done at a later time.    Video visits are billed at different rates depending on your insurance coverage.  Please reach out to your insurance provider with any questions.    If during the course of the call the physician/provider feels a video visit is not appropriate, you will not be charged for this service.\"    Patient has given verbal consent for Video visit? Yes  How would you like to obtain your AVS? [facility]  Patient would like the video invitation sent by: Other e-mail: My Chart  Will anyone else be joining your video visit? No      Shweta Simmons LPN      Video-Visit Details    Type of service:  Video Visit    Video Start Time: 4:02 PM  Video End Time: 4:44 PM    Originating Location (pt. Location): Home    Distant Location (provider location):  [facility]     Platform used for Video Visit: Edgar samson switched " to Doximity due to technical issues    NEW SURGICAL CONSULTATION  Jul 7, 2020    Merlin J Koppendrayer is a 79 year old male who presents with a mass in the right groin.  He was referred by Dr Giraldo.    HPI:    He noted a right groin mass since June 2020.  He denies any overlying skin changes; tenderness; swelling, numbness or weakness in the ipsilateral leg.     He has a history of a 0.8 mm deep melanoma without ulceration or mitoses of the RIGHT 2nd and 3rd toe web space, diagnosed in July 2015.  He underwent a transmetatarsal amputation of the right 2nd and 3rd toes on 7/31/2015 without residual melanoma on surgical pathology.  No sentinel lymph node biopsy was performed.  He has tolerable phantom pain intermittently at the site of the amputation.      He denies noting any other masses, such as behind the right knee, or in the contralateral lower extremity, or in the neck or axillae bilaterally.     MELANOMA-SPECIFIC HISTORY:  Prior skin cancer: Yes - melanoma in 2015; no other skin cancers.  Works indoors/outdoors: Outdoors - mows lawns; retired .  Uses sunscreen    A skin check has not yet been performed.    FAMILY HISTORY:  Pancreatic ca: No  Melanoma: No  Breast ca: No  Other cancer: Yes - paternal uncle with colon    Past Medical History:   Diagnosis Date     Amblyopia of eye, left      Bilateral cataracts 2016     Diabetes (H)      Diabetic eye exam (H) 12/17/14     Esophageal reflux      Hypersomnia with sleep apnea, unspecified      Inguinal hernia without mention of obstruction or gangrene, unilateral or unspecified, (not specified as recurrent)     s/p repair     Melanoma (H) 07/10/2015    web space between 2 & 3 toe, bx to periphery of margin.  Amputation of 2&3 toe, margins clear     Mixed hyperlipidemia      Other isolated or specific phobias     claustrophobia     Presbyopia      Prostatitis, unspecified      Pyelonephritis, unspecified      Unspecified essential hypertension       Unspecified hearing loss    , usually 120-130  No MI, CVA    Past Surgical History:   Procedure Laterality Date     AMPUTATE TOE(S) Right 7/31/2015    Procedure: AMPUTATE TOE(S);  Surgeon: Neal Slater MD;  [facility]     C LAP,HERNIA REPAIR PROC,UNLIST       cataract extraction & yag laser capsulotomy Bilateral 2016     COLONOSCOPY  04/15/09     COLONOSCOPY N/A 11/1/2017    Procedure: COLONOSCOPY;  Colonoscopy;  Surgeon: Chapin Rodriguez MD;  [facility] GI     EXCISE MASS FOOT Right 7/10/2015    Procedure: EXCISE MASS FOOT;  Surgeon: Nickolas Farah DPM;  Location[facility]     REPAIR SYNDACTYLY FOOT Right 7/10/2015    Procedure: REPAIR SYNDACTYLY FOOT;  Surgeon: Nickolas Farah DPM;  Location: [facility] OR   No GA issues    Current Outpatient Medications   Medication Sig Dispense Refill     ACCU-CHEK SMARTVIEW test strip USE 1 STRIP TO CHECK GLUCOSE TWICE DAILY (VARY  THE  TIME  OF  DAY) 100 strip 1     aMILoride-hydrochlorothiazide (MODURETIC) 5-50 MG TABS per tablet TAKE 1 TABLET EVERY DAY 90 tablet 3     amLODIPine (NORVASC) 5 MG tablet TAKE 1 TABLET EVERY DAY 90 tablet 3     ASPIRIN 81 MG OR TABS 1 tab po QD (Once per day) 100 3     atorvastatin (LIPITOR) 40 MG tablet One tablet daily 90 tablet 3     blood glucose monitoring (ACCU-CHEK FASTCLIX) lancets Use 1 needle 2-3 times 250 each 3     Cholecalciferol (VITAMIN D PO) Take by mouth daily       metFORMIN (GLUCOPHAGE) 500 MG tablet Take 1 tablet (500 mg) by mouth 2 times daily (with meals) 180 tablet 3     metoprolol tartrate (LOPRESSOR) 50 MG tablet TAKE 2 TABLETS IN THE MORNING  AND TAKE 1 TABLET EVERY NIGHT 270 tablet 3     omeprazole (PRILOSEC) 20 MG DR capsule TAKE 1 CAPSULE EVERY DAY 90 capsule 3     order for DME Equipment ordered: RESMED Auto PAP Mask type: Full face  Settings: 8-15 cm h2o       potassium chloride ER (KLOR-CON M) 20 MEQ CR tablet TAKE 1 TABLET THREE TIMES DAILY  (DUE  FOR  LAB  CHECK  BEFORE  NEXT REFILL) 270  tablet 1           Allergies   Allergen Reactions     Sulfa Drugs      rash      SOCIAL HISTORY:  Smokes: No - quit 1976  EtOH: Yes 1 every 2 weeks  Illicit drugs: No    ROS  Headaches No  Vision changes No  Dizziness No  Abdominal pain No   Diarrhea - intermittent with metformin  Rectal bleeding No  Melena No   Last colonoscopy in 2017 per patient - was normal  Unintentional weight loss No    Physical Exam  Constitutional:       General: He is not in acute distress.     Appearance: Normal appearance.      Comments: The rest of a comprehensive physical examination is deferred due to public health emergency video visit restrictions.   Pulmonary:      Effort: No respiratory distress.   Neurological:      Mental Status: He is alert and oriented to person, place, and time.          INVESTIGATIONS:    PET/CT (7/3/2020) showed:  FINDINGS:   HEAD/NECK:  There is no suspicious FDG uptake in the neck.   Mucosal thickening of the right maxillary sinus, otherwise the paranasal sinuses are clear. The mastoid air cells are clear.   The mucosal pharyngeal space, the , prevertebral and carotid spaces are within normal limits.   No masses, mass effect or pathologically enlarged lymph nodes are evident. Hypodense nonhypermetabolic 1.1 cm left thyroid nodule.  CHEST:  There is no suspicious FDG uptake in the chest.   No pulmonary parenchymal consolidation. No pleural effusion or pneumothorax. Bilateral pleural plaques. Dependent atelectasis. No suspicious pulmonary nodules. Multiple sub-4 mm nodules scattered throughout the lungs, some of which are partially calcified, similar to prior. The central tracheobronchial tree is patent. Heart size is normal. No pericardial effusion. Normal caliber of the aorta and main pulmonary artery. Normal branching pattern of the great vessels. No pathologically enlarged mediastinal, hilar, or axillary lymphadenopathy. Normal esophagus.  ABDOMEN AND PELVIS:  Hypermetabolic right inguinal  lymph node measuring 15 mm with max SUV 12.5. Focal hypermetabolic activity in the rectum 6 cm from anal verge  with max SUV 7.1, with mild wall thickening on CT. Additional focus of hypermetabolic activity in the distal small bowel with Max SUV 10.7 correlating with short segment wall thickening seen on CT (series 5 image 371). Decompressed loops of bowel in the right side of the pelvis. Mildly dilated small bowel loops in the low mid abdomen. Normal caliber of the colon. Normal appendix.  There are no suspicious hepatic lesions. There is no splenomegaly or evidence for splenic or pancreatic mass lesion. There are no suspicious adrenal mass lesions or opaque gallbladder calculi.  There is symmetric nephrographic renal phase without hydronephrosis. The bladder is distended and unremarkable. Prostatomegaly.  No free air or free fluid in the abdomen or pelvis. Normal caliber of the infrarenal aorta. Patent portal vein. No pathologically enlarged mesenteric or retroperitoneal lymph nodes.  LOWER EXTREMITIES:   No abnormal masses or hypermetabolic lesions.  BONES:   There are no suspicious lytic or blastic osseous lesions.  There is no abnormal FDG uptake in the skeleton. Degenerative changes of the spine.  IMPRESSION:   In this patient with history of melanoma:  1. There is a hypermetabolic right inguinal lymph node, suspicious for metastatic disease. Recommend tissue sampling.  2. Focal hypermetabolic uptake in the distal small bowel with associated bowel wall thickening, likely infectious/inflammatory in nature.  3. Hypermetabolic uptake in the rectum approximately 6 cm from the liver which with CT soft tissue thickening, differential peristalsis versus true lesion. Consider colonoscopy (Negative Colonoscopy 2017).      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    I reviewed the imaging findings with Merlin J Koppendrayer  and his wife. We discussed management options for regionally recurrent melanoma, including surgery and immunotherapy.  We reviewed options include upfront immunotherapy versus upfront surgery.  I have recommended a needle biopsy to establish the diagnosis first; I have personally communicated this to the Interventional Radiology team today.  I have also recommended a consultation with Dr Mayes of Medical Oncology to further discuss immunotherapy.     Regarding the rectal findings on PET/CT, I recommended endoscopy.  We will likely follow the small bowel findings on subsequent PET/CTs.    For surgical management of regionally recurrent melanoma, I recommended a superficial inguinofemoral lymph node dissection.  In the absence of distant disease, an inguinal lymph node dissection would likely offer the most benefit for locoregional control.  We reviewed that depending on the pathology, adjuvant radiation may be indicated.  We reviewed the risks and benefits of an inguinal lymph node dissection, including bleeding, wound infection, wound dehiscence, seroma formation, and lymphedema.  With his history of diabetes and a HbA1c >7, he likely will have wound complications.    We reviewed that patients with a diagnosis of melanoma are at risk of recurrence (local and distant) and of subsequent de anabell melanoma.  I also reviewed the importance of protection from sun exposure, including wearing long sleeves, hats, etc and also the use of sunscreen with SPF of at least 35, with frequent re-applications. I recommended a dermatology evaluation as well for a full skin check.    All of the above were discussed with the patient and all questions answered.  Merlin J Koppendrayer elected to proceed with needle biopsy of the right groin lymph node.   I will follow up with him after the biopsy to further discuss the next steps.    Total time spent with the patient was 45 minutes, of which 75% was counseling.     PLAN:  1.  Needle biopsy of the right groin lymph node by Interventional Radiology  2. Medical oncology referral - will discuss upfront immunotherapy versus upfront surgery  3. Dermatology referral for full skin check  4. Will need a RIGHT superficial inguinal lymph node dissection  5. Gastroenterology referral for endoscopy for rectal PET/CT findings    Eli Keller MD MSc [facility] FACS    Division of Surgical Oncology  [facility]

## 2020-07-08 ENCOUNTER — PATIENT OUTREACH (OUTPATIENT)
Dept: ONCOLOGY | Facility: CLINIC | Age: 80
End: 2020-07-08

## 2020-07-08 ENCOUNTER — PATIENT OUTREACH (OUTPATIENT)
Dept: SURGERY | Facility: CLINIC | Age: 80
End: 2020-07-08

## 2020-07-08 DIAGNOSIS — R94.8 ABNORMAL PET SCAN OF COLON: ICD-10-CM

## 2020-07-08 DIAGNOSIS — R59.1 LYMPHADENOPATHY: ICD-10-CM

## 2020-07-08 DIAGNOSIS — C43.71 MALIGNANT MELANOMA OF SKIN OF TOE OF RIGHT FOOT (H): Primary | ICD-10-CM

## 2020-07-08 DIAGNOSIS — Z11.59 ENCOUNTER FOR SCREENING FOR OTHER VIRAL DISEASES: Primary | ICD-10-CM

## 2020-07-08 DIAGNOSIS — R19.09 RIGHT GROIN MASS: ICD-10-CM

## 2020-07-08 NOTE — PROGRESS NOTES
New Patient Oncology Nurse Navigator Note     Referring provider: Dr Keller    Referring Clinic/Organization: [facility]     Referred to: Medical Oncology - Solid Tumor     Requested provider (if applicable): Dr. Oliveira    Referral Received: 07/08/20       Evaluation for : Hx right toe melanoma, now with new right inguinal node suspicious for metastatic disease     Clinical History (per Nurse review of records provided):      7/2015 melanoma right toes, s/p amputation 2nd & 3rd toes by Dr Keller    7/3/2020 PET shows hypermetabolic right inguinal node suspicious for metastatic disease - He's met with IR to plan for biopsy.     Clinical Assessment / Barriers to Care (Per Nurse):    Dr Keller requesting consult with Dr Oliveira to discuss possible neoadjuvant systemic treatment. Pt agreeable to do telephone visit with Dr Oliveira tomorrow 7/9/20 at 10:45am. Will use home# for the call and use pt's cell as back up. Pt agrees & verbalizes understanding of this plan.    Records Location: [facility]     Records Needed:     None    Additional testing needed prior to consult:     None    Higinio Arellano, RN, BSN, OCN

## 2020-07-08 NOTE — TELEPHONE ENCOUNTER
Surgical Oncology RN Care Coordination Note:     Received a note from Dr. Keller to arrange for the following:   - Visit with Dr. Oliveira - medical oncology   - Visit with dermatology - history of melanoma   - Colonoscopy - further evaluation the findings noted on PET scan.   - IR biopsy for groin mass - Dr. Keller messaged IR already and pt as been seen.     Called and spoke with patient regarding the plan outlined above. He agreed with plan and is aware he will be contacted to schedule the additional appointments.       Malena Brown, RN, BSN  Care Coordinator   [phone]

## 2020-07-09 ENCOUNTER — VIRTUAL VISIT (OUTPATIENT)
Dept: ONCOLOGY | Facility: CLINIC | Age: 80
End: 2020-07-09
Attending: SURGERY
Payer: COMMERCIAL

## 2020-07-09 ENCOUNTER — PRE VISIT (OUTPATIENT)
Dept: ONCOLOGY | Facility: CLINIC | Age: 80
End: 2020-07-09

## 2020-07-09 DIAGNOSIS — R19.09 RIGHT GROIN MASS: ICD-10-CM

## 2020-07-09 DIAGNOSIS — C43.71 MALIGNANT MELANOMA OF SKIN OF TOE OF RIGHT FOOT (H): ICD-10-CM

## 2020-07-09 PROCEDURE — 99214 OFFICE O/P EST MOD 30 MIN: CPT | Mod: 95 | Performed by: INTERNAL MEDICINE

## 2020-07-09 PROCEDURE — 40001009 ZZH VIDEO/TELEPHONE VISIT; NO CHARGE

## 2020-07-09 ASSESSMENT — PAIN SCALES - GENERAL: PAINLEVEL: NO PAIN (0)

## 2020-07-09 NOTE — PROGRESS NOTES
[facility]  MEDICAL ONCOLOGY CONSULT  Jul 9, 2020    CHIEF COMPLAINT: Acral melanoma recurrence    HISTORY OF PRESENT ILLNESS  Merlin J Koppendrayer is a 79 year old male with a recent diagnosis of an acral melanoma of the right 2nd and 3rd toe webspace, diagnosed in July 2015. He is referred by Dr. Keller for multidisciplinary consultation.    He underwent a transmetatarsal amputation of the right 2nd and 3rd toes on 7/31/2015 without residual melanoma on surgical pathology.  No sentinel lymph node biopsy was performed.  He has some residual phantom pain intermittently at the site of amputation.    He notes development of a palpable right upper thigh lymph node over the last 2-3 weeks. No other palpable masses. PET-CT scan obtained 7/3/2020 showed a hypermetabolic right inguinal lymph node. There was also focal hypermetabolism in the distal small bowel and in the rectum about 5 cm from liver. Dr Keller has arranged for core needle biopsy to confirm the diagnosis. She has also recommended endoscopy for the bowel findings.    Patient denies fevers, chills, night sweats, shortness of breath, chest pain, nausea, vomiting, or bowel complaints. ECOG performance status is 0.      REVIEW OF SYSTEMS  A 12-point ROS negative except as in HPI    Current Outpatient Medications   Medication Sig Dispense Refill     ACCU-CHEK SMARTVIEW test strip USE 1 STRIP TO CHECK GLUCOSE TWICE DAILY (VARY  THE  TIME  OF  DAY) 100 strip 1     aMILoride-hydrochlorothiazide (MODURETIC) 5-50 MG TABS per tablet TAKE 1 TABLET EVERY DAY 90 tablet 3     amLODIPine (NORVASC) 5 MG tablet TAKE 1 TABLET EVERY DAY 90 tablet 3     ASPIRIN 81 MG OR TABS 1 tab po QD (Once per day) 100 3     atorvastatin (LIPITOR) 40 MG tablet One tablet daily 90 tablet 3     blood glucose monitoring (ACCU-CHEK FASTCLIX) lancets Use 1 needle 2-3 times 250 each 3     Cholecalciferol (VITAMIN D PO) Take by mouth daily       metFORMIN (GLUCOPHAGE) 500 MG tablet Take 1  tablet (500 mg) by mouth 2 times daily (with meals) 180 tablet 3     metoprolol tartrate (LOPRESSOR) 50 MG tablet TAKE 2 TABLETS IN THE MORNING  AND TAKE 1 TABLET EVERY NIGHT 270 tablet 3     omeprazole (PRILOSEC) 20 MG DR capsule TAKE 1 CAPSULE EVERY DAY 90 capsule 3     order for DME Equipment ordered: RESMED Auto PAP Mask type: Full face  Settings: 8-15 cm h2o       potassium chloride ER (KLOR-CON M) 20 MEQ CR tablet TAKE 1 TABLET THREE TIMES DAILY  (DUE  FOR  LAB  CHECK  BEFORE  NEXT REFILL) 270 tablet 1       Allergies   Allergen Reactions     Sulfa Drugs      rash     Immunization History   Administered Date(s) Administered     HEPA 03/28/2012     HepA-Adult 11/28/2012, 01/28/2013     HepB 01/28/2013     HepB-Adult 11/28/2012     Influenza (H1N1) 02/01/2010     Influenza (High Dose) 3 valent vaccine 11/30/2010, 10/23/2012, 10/28/2013, 10/27/2014, 10/26/2015, 01/23/2017, 10/27/2017, 09/21/2018, 10/30/2019     Influenza (IIV3) PF 10/28/2003, 11/03/2005, 11/18/2006, 12/03/2007, 10/28/2008, 09/25/2009, 10/15/2011     Pneumo Conj 13-V (2010&after) 01/20/2016     Pneumococcal 23 valent 12/03/2007     TD (ADULT, 7+) 05/15/1996, 02/21/2006     TDAP Vaccine (Boostrix) 05/11/2012       Past Medical History:   Diagnosis Date     Amblyopia of eye, left      Bilateral cataracts 2016     Diabetes (H)      Diabetic eye exam (H) 12/17/14     Esophageal reflux      Hypersomnia with sleep apnea, unspecified      Inguinal hernia without mention of obstruction or gangrene, unilateral or unspecified, (not specified as recurrent)     s/p repair     Melanoma (H) 07/10/2015    web space between 2 & 3 toe, bx to periphery of margin.  Amputation of 2&3 toe, margins clear     Mixed hyperlipidemia      Other isolated or specific phobias     claustrophobia     Presbyopia      Prostatitis, unspecified      Pyelonephritis, unspecified      Unspecified essential hypertension      Unspecified hearing loss        Past Surgical History:    Procedure Laterality Date     AMPUTATE TOE(S) Right 7/31/2015    Procedure: AMPUTATE TOE(S);  Surgeon: Neal Slater MD;  [facility]     C LAP,HERNIA REPAIR PROC,UNLIST       cataract extraction & yag laser capsulotomy Bilateral 2016     COLONOSCOPY  04/15/09     COLONOSCOPY N/A 11/1/2017    Procedure: COLONOSCOPY;  Colonoscopy;  Surgeon: Chapin Rodriguez MD;  [facility]     EXCISE MASS FOOT Right 7/10/2015    Procedure: EXCISE MASS FOOT;  Surgeon: Nickolas Farah DPM;  [facility]     REPAIR SYNDACTYLY FOOT Right 7/10/2015    Procedure: REPAIR SYNDACTYLY FOOT;  Surgeon: Nickolas Farah DPM;  Location: [facility] OR       SOCIAL HISTORY  History   Smoking Status     Former Smoker     Quit date: 1/1/1974   Smokeless Tobacco     Never Used     Comment: Quit 1974     Social History    Substance and Sexual Activity      Alcohol use: Yes        Alcohol/week: 4.0 standard drinks        Types: 4 Standard drinks or equivalent per week        Frequency: 2-4 times a month     History   Drug Use No       FAMILY HISTORY  Family History   Problem Relation Age of Onset     Diabetes Mother      Cerebrovascular Disease Mother      Diabetes Father      Cancer - colorectal Maternal Uncle      Connective Tissue Disorder Paternal Uncle         lupus     Connective Tissue Disorder Other         nieces and nephews with lupus     C.A.D. Brother         x 2       PHYSICAL EXAMINATION  There were no vitals taken for this visit.  Wt Readings from Last 2 Encounters:   06/23/20 79.5 kg (175 lb 6 oz)   06/12/20 78.9 kg (174 lb)   Telephone consultation.    IMAGING  PET-CT, 7/3/2020  IMPRESSION:   In this patient with history of melanoma:  1. There is a hypermetabolic right inguinal lymph node, suspicious for  metastatic disease. Recommend tissue sampling.  2. Focal hypermetabolic uptake in the distal small bowel with  associated bowel wall thickening, likely infectious/inflammatory in  nature.  3. Hypermetabolic uptake  "in the rectum approximately 6 cm from the  liver which with CT soft tissue thickening, differential peristalsis  versus true lesion. Consider colonoscopy (Negative Colonoscopy 2017).     ASSESSMENT AND PLAN    #1 Acral melanoma, right 2nd and 3rd two webspace, resected in 2015 now with laura recurrence, T1b N1, Stage IIIB  It was a pleasure to meet Mr. Nash. He is a very pleasant 79 year old man with appearance of a laura recurrence of acral melanoma. He has already met with Dr. Keller, and she plans to obtain cored needle biopsy for confirmation of diagnosis. She has also recommended endoscopy. I agree with plans for further work-up.    I reviewed the diagnosis of stage III disease with the patient and the ultimate need for surgery to remove the involved lymph node(s). Pre-operative therapy could be considered to try to minimize the need for completion lymph node dissection and risk of lymphedema. We discussed clinical trial options as well as immunotherapy treatment off protocol. Surgery and adjuvant therapy would be another consideration.    I will follow the results of the planned needle biopsy next week, and we will submit for molecular testing and PD-L1 expression to assess for changes that could impact choice of therapy.    I will plan to review results of the biopsy and tumor board recommendations with him in 2-3 weeks. Multiple questions answered.    Renata Mayes M.D.   of Medicine  Hematology, Oncology and Transplantation          Merlin J Koppendrayer is a 79 year old male who is being evaluated via a billable telephone visit.      The patient has been notified of following:     \"This telephone visit will be conducted via a call between you and your physician/provider. We have found that certain health care needs can be provided without the need for a physical exam.  This service lets us provide the care you need with a short phone conversation.  If a prescription is " "necessary we can send it directly to your pharmacy.  If lab work is needed we can place an order for that and you can then stop by our lab to have the test done at a later time.    Telephone visits are billed at different rates depending on your insurance coverage. During this emergency period, for some insurers they may be billed the same as an in-person visit.  Please reach out to your insurance provider with any questions.    If during the course of the call the physician/provider feels a telephone visit is not appropriate, you will not be charged for this service.\"    Patient has given verbal consent for Telephone visit?  Yes    What phone number would you like to be contacted at? [phone]    How would you like to obtain your AVS? Jean Pierrehart    Vitals - Patient Reported  Weight (Patient Reported): 77.6 kg (171 lb)  Height (Patient Reported): 180.3 cm (5' 11\")  BMI (Based on Pt Reported Ht/Wt): 23.85    Akilah Pizarro CMA July 9, 2020  10:19 AM     Phone call duration: 25 minutes        "

## 2020-07-09 NOTE — TELEPHONE ENCOUNTER
RECORDS STATUS - ALL OTHER DIAGNOSIS      RECORDS RECEIVED FROM: [facility] - Internal Referral   DATE RECEIVED: 7/9/20    NOTES STATUS DETAILS   OFFICE NOTE from referring provider     OFFICE NOTE from medical oncologist     DISCHARGE SUMMARY from hospital     DISCHARGE REPORT from the ER     OPERATIVE REPORT     MEDICATION LIST     CLINICAL TRIAL TREATMENTS TO DATE     LABS     PATHOLOGY REPORTS     ANYTHING RELATED TO DIAGNOSIS     GENONOMIC TESTING     TYPE:     IMAGING (NEED IMAGES & REPORT)     CT SCANS     MRI     MAMMO     ULTRASOUND     PET

## 2020-07-09 NOTE — LETTER
7/9/2020         RE: Merlin J Koppendrayer  [address] [facility] [address]        Dear Colleague,    Thank you for referring your patient, Merlin J Koppendrayer, to the [facility]. Please see a copy of my visit note below.    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 He is referred by Dr. Keller for multidisciplinary consultation.    He underwent a transmetatarsal amputation of the right 2nd and 3rd toes on 7/31/2015 without residual melanoma on surgical pathology.  No sentinel lymph node biopsy was performed.  He has some residual phantom pain intermittently at the site of amputation.    He notes development of a palpable right upper thigh lymph node over the last 2-3 weeks. No other palpable masses. PET-CT scan obtained 7/3/2020 showed a hypermetabolic right inguinal lymph node. There was also focal hypermetabolism in the distal small bowel and in the rectum about 5 cm from liver. Dr Keller has arranged for core needle biopsy to confirm the diagnosis. She has also recommended endoscopy for the bowel findings.    Patient denies fevers, chills, night sweats, shortness of breath, chest pain, nausea, vomiting, or bowel complaints. ECOG performance status is 0.      REVIEW OF SYSTEMS  A 12-point ROS negative except as in HPI    Current Outpatient Medications   Medication Sig Dispense Refill     ACCU-CHEK SMARTVIEW test strip USE 1 STRIP TO CHECK GLUCOSE TWICE DAILY (VARY  THE  TIME  OF  DAY) 100 strip 1     aMILoride-hydrochlorothiazide (MODURETIC) 5-50 MG TABS per tablet TAKE 1 TABLET EVERY DAY 90 tablet 3     amLODIPine (NORVASC) 5 MG tablet TAKE 1 TABLET EVERY DAY 90 tablet 3     ASPIRIN 81 MG OR TABS 1 tab po QD (Once per day) 100 3     atorvastatin  (LIPITOR) 40 MG tablet One tablet daily 90 tablet 3     blood glucose monitoring (ACCU-CHEK FASTCLIX) lancets Use 1 needle 2-3 times 250 each 3     Cholecalciferol (VITAMIN D PO) Take by mouth daily       metFORMIN (GLUCOPHAGE) 500 MG tablet Take 1 tablet (500 mg) by mouth 2 times daily (with meals) 180 tablet 3     metoprolol tartrate (LOPRESSOR) 50 MG tablet TAKE 2 TABLETS IN THE MORNING  AND TAKE 1 TABLET EVERY NIGHT 270 tablet 3     omeprazole (PRILOSEC) 20 MG DR capsule TAKE 1 CAPSULE EVERY DAY 90 capsule 3     order for DME Equipment ordered: RESMED Auto PAP Mask type: Full face  Settings: 8-15 cm h2o       potassium chloride ER (KLOR-CON M) 20 MEQ CR tablet TAKE 1 TABLET THREE TIMES DAILY  (DUE  FOR  LAB  CHECK  BEFORE  NEXT REFILL) 270 tablet 1       Allergies   Allergen Reactions     Sulfa Drugs      rash     Immunization History   Administered Date(s) Administered     HEPA 03/28/2012     HepA-Adult 11/28/2012, 01/28/2013     HepB 01/28/2013     HepB-Adult 11/28/2012     Influenza (H1N1) 02/01/2010     Influenza (High Dose) 3 valent vaccine 11/30/2010, 10/23/2012, 10/28/2013, 10/27/2014, 10/26/2015, 01/23/2017, 10/27/2017, 09/21/2018, 10/30/2019     Influenza (IIV3) PF 10/28/2003, 11/03/2005, 11/18/2006, 12/03/2007, 10/28/2008, 09/25/2009, 10/15/2011     Pneumo Conj 13-V (2010&after) 01/20/2016     Pneumococcal 23 valent 12/03/2007     TD (ADULT, 7+) 05/15/1996, 02/21/2006     TDAP Vaccine (Boostrix) 05/11/2012       Past Medical History:   Diagnosis Date     Amblyopia of eye, left      Bilateral cataracts 2016     Diabetes (H)      Diabetic eye exam (H) 12/17/14     Esophageal reflux      Hypersomnia with sleep apnea, unspecified      Inguinal hernia without mention of obstruction or gangrene, unilateral or unspecified, (not specified as recurrent)     s/p repair     Melanoma (H) 07/10/2015    web space between 2 & 3 toe, bx to periphery of margin.  Amputation of 2&3 toe, margins clear     Mixed  hyperlipidemia      Other isolated or specific phobias     claustrophobia     Presbyopia      Prostatitis, unspecified      Pyelonephritis, unspecified      Unspecified essential hypertension      Unspecified hearing loss        Past Surgical History:   Procedure Laterality Date     AMPUTATE TOE(S) Right 7/31/2015    Procedure: AMPUTATE TOE(S);  Surgeon: Neal Slater MD;  [facility]: [facility]     C LAP,HERNIA REPAIR PROC,UNLIST       cataract extraction & yag laser capsulotomy Bilateral 2016     COLONOSCOPY  04/15/09     COLONOSCOPY N/A 11/1/2017    Procedure: COLONOSCOPY;  Colonoscopy;  Surgeon: Chapin Rodriguez MD;  Location: [facility]     EXCISE MASS FOOT Right 7/10/2015    Procedure: EXCISE MASS FOOT;  Surgeon: Nickolas Farah DPM;  Location: [facility]     REPAIR SYNDACTYLY FOOT Right 7/10/2015    Procedure: REPAIR SYNDACTYLY FOOT;  Surgeon: Nickolas Farah DPM;  Location: [facility] OR       SOCIAL HISTORY  History   Smoking Status     Former Smoker     Quit date: 1/1/1974   Smokeless Tobacco     Never Used     Comment: Quit 1974     Social History    Substance and Sexual Activity      Alcohol use: Yes        Alcohol/week: 4.0 standard drinks        Types: 4 Standard drinks or equivalent per week        Frequency: 2-4 times a month     History   Drug Use No       FAMILY HISTORY  Family History   Problem Relation Age of Onset     Diabetes Mother      Cerebrovascular Disease Mother      Diabetes Father      Cancer - colorectal Maternal Uncle      Connective Tissue Disorder Paternal Uncle         lupus     Connective Tissue Disorder Other         nieces and nephews with lupus     C.A.D. Brother         x 2       PHYSICAL EXAMINATION  There were no vitals taken for this visit.  Wt Readings from Last 2 Encounters:   06/23/20 79.5 kg (175 lb 6 oz)   06/12/20 78.9 kg (174 lb)   Telephone consultation.    IMAGING  PET-CT, 7/3/2020  IMPRESSION:   In this patient with history of melanoma:  1. There is a hypermetabolic  "right inguinal lymph node, suspicious for  metastatic disease. Recommend tissue sampling.  2. Focal hypermetabolic uptake in the distal small bowel with  associated bowel wall thickening, likely infectious/inflammatory in  nature.  3. Hypermetabolic uptake in the rectum approximately 6 cm from the  liver which with CT soft tissue thickening, differential peristalsis  versus true lesion. Consider colonoscopy (Negative Colonoscopy 2017).     ASSESSMENT AND PLAN    #1 Acral melanoma, right 2nd and 3rd two webspace, resected in 2015 now with laura recurrence, T1b N1, Stage IIIB  It was a pleasure to meet Mr. Nash. He is a very pleasant 79 year old man with appearance of a laura recurrence of acral melanoma. He has already met with Dr. Keller, and she plans to obtain cored needle biopsy for confirmation of diagnosis. She has also recommended endoscopy. I agree with plans for further work-up.    I reviewed the diagnosis of stage III disease with the patient and the ultimate need for surgery to remove the involved lymph node(s). Pre-operative therapy could be considered to try to minimize the need for completion lymph node dissection and risk of lymphedema. We discussed clinical trial options as well as immunotherapy treatment off protocol. Surgery and adjuvant therapy would be another consideration.    I will follow the results of the planned needle biopsy next week, and we will submit for molecular testing and PD-L1 expression to assess for changes that could impact choice of therapy.    I will plan to review results of the biopsy and tumor board recommendations with him in 2-3 weeks. Multiple questions answered.    Renata Mayes M.D.   of Medicine  Hematology, Oncology and Transplantation          Merlin J Koppendrayer is a 79 year old male who is being evaluated via a billable telephone visit.      The patient has been notified of following:     \"This telephone visit will be conducted " "via a call between you and your physician/provider. We have found that certain health care needs can be provided without the need for a physical exam.  This service lets us provide the care you need with a short phone conversation.  If a prescription is necessary we can send it directly to your pharmacy.  If lab work is needed we can place an order for that and you can then stop by our lab to have the test done at a later time.    Telephone visits are billed at different rates depending on your insurance coverage. During this emergency period, for some insurers they may be billed the same as an in-person visit.  Please reach out to your insurance provider with any questions.    If during the course of the call the physician/provider feels a telephone visit is not appropriate, you will not be charged for this service.\"    Patient has given verbal consent for Telephone visit?  Yes    What phone number would you like to be contacted at? [phone]    How would you like to obtain your AVS? Selene    Vitals - Patient Reported  Weight (Patient Reported): 77.6 kg (171 lb)  Height (Patient Reported): 180.3 cm (5' 11\")  BMI (Based on Pt Reported Ht/Wt): 23.85    Akilah Pizarro CMA July 9, 2020  10:19 AM     Phone call duration: 25 minutes          Again, thank you for allowing me to participate in the care of your patient.        Sincerely,        Renata Oliveira MD    "

## 2020-07-10 ENCOUNTER — TELEPHONE (OUTPATIENT)
Dept: INTERNAL MEDICINE | Facility: CLINIC | Age: 80
End: 2020-07-10

## 2020-07-10 ENCOUNTER — TELEPHONE (OUTPATIENT)
Dept: GASTROENTEROLOGY | Facility: CLINIC | Age: 80
End: 2020-07-10

## 2020-07-10 DIAGNOSIS — K63.9 COLONIC THICKENING: ICD-10-CM

## 2020-07-10 DIAGNOSIS — I10 ESSENTIAL HYPERTENSION WITH GOAL BLOOD PRESSURE LESS THAN 140/90: Primary | ICD-10-CM

## 2020-07-10 RX ORDER — BISACODYL 5 MG
15 TABLET, DELAYED RELEASE (ENTERIC COATED) ORAL ONCE
Qty: 4 TABLET | Refills: 0 | Status: SHIPPED | OUTPATIENT
Start: 2020-07-10 | End: 2020-07-10

## 2020-07-10 NOTE — TELEPHONE ENCOUNTER
Called and talked with the patient about his new lymph nodes on the right side, PET scan showing that and his small bowel and rectal area.  Patient is seen surgery and oncology virtually he would like a referral to [place] as is been there before and we will refer him to the [facility] oncology.    He is set up for a biopsy with radiology next week and should get a COVID test 2 days before which is ordered.  He also has a colonoscopy which will make sure he needs done.    Please do referral to [place] for oncology and metastatic melanoma.

## 2020-07-10 NOTE — TELEPHONE ENCOUNTER
Date of colonoscopy/EGD: 7/23  Surgeon: Dr. Zendejas  Prep:GoLytely, meds ordered, routed to provider  Packet:Colonoscopy/EGD instructions mailed to patient's home address.   Date: 7/10/2020      Surgery Scheduler

## 2020-07-10 NOTE — TELEPHONE ENCOUNTER
Reason for Call: Request for an order or referral:    Order or referral being requested: [facility]    Date needed: as soon as possible    Has the patient been seen by the PCP for this problem? YES    Additional comments: pt was told his cancer is back. Requesting referral to [facility]    Phone number Patient can be reached at:  Home number on file [phone] (home)    Best Time:      Can we leave a detailed message on this number?  YES    Call taken on 7/10/2020 at 9:45 AM by Ashia Culver

## 2020-07-13 ENCOUNTER — TELEPHONE (OUTPATIENT)
Dept: INTERNAL MEDICINE | Facility: CLINIC | Age: 80
End: 2020-07-13

## 2020-07-13 DIAGNOSIS — Z11.59 ENCOUNTER FOR SCREENING FOR OTHER VIRAL DISEASES: Primary | ICD-10-CM

## 2020-07-13 NOTE — TELEPHONE ENCOUNTER
Reason for call:  Other   Patient called regarding (reason for call): call back  Additional comments: Patient spouse requesting a derm appt for this month as referred by Eli Keller    Phone number to reach patient:  Home number on file [phone] (home)    Best Time:  Anytime    Can we leave a detailed message on this number?  YES    Travel screening: Not Applicable

## 2020-07-14 ENCOUNTER — TELEPHONE (OUTPATIENT)
Dept: DERMATOLOGY | Facility: CLINIC | Age: 80
End: 2020-07-14

## 2020-07-14 ENCOUNTER — TELEPHONE (OUTPATIENT)
Dept: INTERNAL MEDICINE | Facility: CLINIC | Age: 80
End: 2020-07-14

## 2020-07-14 NOTE — TELEPHONE ENCOUNTER
[facility] Call Center    Phone Message    May a detailed message be left on voicemail: yes     Reason for Call: Other: Pt was scheduled for an appt in derm and appt was cancelled. Eva called to schedule this appt for pt. Writer unable to schedule as clinic schedules for diagnosis, clinic unavailable at time of call. Please call pt to schedule appt.      Action Taken: Message routed to:  Adult Clinics: Dermatology p [id number]    Travel Screening: Not Applicable

## 2020-07-14 NOTE — TELEPHONE ENCOUNTER
Lump is larger than original, softer than when they first noticed it.  He has a biopsy scheduled for Friday at [facility].    Would you like to see him before then, or call and discuss?      Wife (Mary) calling:  [phone]    Isabela Monsalve XRO/

## 2020-07-14 NOTE — TELEPHONE ENCOUNTER
Spoke to Mary, patient's wife and scheduled with Dr. Bell on Thursday, 7/16 at 2:30PM?. No further questions or concerns at this time.  Mirian Camargo LPN

## 2020-07-15 ENCOUNTER — OFFICE VISIT (OUTPATIENT)
Dept: INTERNAL MEDICINE | Facility: CLINIC | Age: 80
End: 2020-07-15
Payer: COMMERCIAL

## 2020-07-15 VITALS
BODY MASS INDEX: 24.33 KG/M2 | HEART RATE: 53 BPM | OXYGEN SATURATION: 100 % | TEMPERATURE: 96.7 F | SYSTOLIC BLOOD PRESSURE: 132 MMHG | DIASTOLIC BLOOD PRESSURE: 70 MMHG | RESPIRATION RATE: 18 BRPM | WEIGHT: 175.4 LBS

## 2020-07-15 DIAGNOSIS — K40.90 NON-RECURRENT UNILATERAL INGUINAL HERNIA WITHOUT OBSTRUCTION OR GANGRENE: Primary | ICD-10-CM

## 2020-07-15 DIAGNOSIS — R59.9 ENLARGED LYMPH NODES: ICD-10-CM

## 2020-07-15 DIAGNOSIS — Z85.820 HISTORY OF MELANOMA: ICD-10-CM

## 2020-07-15 DIAGNOSIS — K63.9 COLONIC THICKENING: ICD-10-CM

## 2020-07-15 DIAGNOSIS — Z11.59 ENCOUNTER FOR SCREENING FOR OTHER VIRAL DISEASES: ICD-10-CM

## 2020-07-15 PROCEDURE — 99214 OFFICE O/P EST MOD 30 MIN: CPT | Performed by: INTERNAL MEDICINE

## 2020-07-15 PROCEDURE — U0003 INFECTIOUS AGENT DETECTION BY NUCLEIC ACID (DNA OR RNA); SEVERE ACUTE RESPIRATORY SYNDROME CORONAVIRUS 2 (SARS-COV-2) (CORONAVIRUS DISEASE [COVID-19]), AMPLIFIED PROBE TECHNIQUE, MAKING USE OF HIGH THROUGHPUT TECHNOLOGIES AS DESCRIBED BY CMS-2020-01-R: HCPCS | Performed by: CLINICAL NURSE SPECIALIST

## 2020-07-15 ASSESSMENT — PAIN SCALES - GENERAL: PAINLEVEL: NO PAIN (0)

## 2020-07-15 NOTE — PROGRESS NOTES
Subjective     Merlin J Koppendrayer is a 79 year old male who presents to clinic today for the following health issues:    HPI       Chief Complaint   Patient presents with     Mass     right side of groin, notice it 2 days ago       First notice some right side nodules. Work up going on.  Now more prominent bulge and soft.      Patient Active Problem List   Diagnosis     Hearing loss     Inguinal hernia     Prostatitis     Pyelonephritis     HYPERLIPIDEMIA LDL GOAL <130     Advanced directives, counseling/discussion     MICHELLE (obstructive sleep apnea)     Anxiety attack     Type 2 diabetes mellitus without complications (H)     Gastroesophageal reflux disease without esophagitis     Essential hypertension with goal blood pressure less than 140/90     Acute right-sided low back pain with right-sided sciatica     Triceps strain, initial encounter     Past Surgical History:   Procedure Laterality Date     AMPUTATE TOE(S) Right 2015    Procedure: AMPUTATE TOE(S);  Surgeon: Neal Slater MD;  [facility]     C LAP,HERNIA REPAIR PROC,UNLIST       cataract extraction & yag laser capsulotomy Bilateral      COLONOSCOPY  04/15/09     COLONOSCOPY N/A 2017    Procedure: COLONOSCOPY;  Colonoscopy;  Surgeon: Chapin Rodriguez MD;  [facility]     EXCISE MASS FOOT Right 7/10/2015    Procedure: EXCISE MASS FOOT;  Surgeon: Nickolas Farah DPM;  [facility]: [facility] OR     REPAIR SYNDACTYLY FOOT Right 7/10/2015    Procedure: REPAIR SYNDACTYLY FOOT;  Surgeon: Nickolas Farah DPM;  Location: [facility] OR       Social History     Tobacco Use     Smoking status: Former Smoker     Last attempt to quit: 1974     Years since quittin.5     Smokeless tobacco: Never Used     Tobacco comment: Quit     Substance Use Topics     Alcohol use: Yes     Alcohol/week: 4.0 standard drinks     Types: 4 Standard drinks or equivalent per week     Frequency: 2-4 times a month     Family History   Problem Relation Age of  Onset     Diabetes Mother      Cerebrovascular Disease Mother      Diabetes Father      Cancer - colorectal Maternal Uncle      Connective Tissue Disorder Paternal Uncle         lupus     Connective Tissue Disorder Other         nieces and nephews with lupus     C.A.D. Brother         x 2         Current Outpatient Medications   Medication Sig Dispense Refill     ACCU-CHEK SMARTVIEW test strip USE 1 STRIP TO CHECK GLUCOSE TWICE DAILY (VARY  THE  TIME  OF  DAY) 100 strip 1     aMILoride-hydrochlorothiazide (MODURETIC) 5-50 MG TABS per tablet TAKE 1 TABLET EVERY DAY 90 tablet 3     amLODIPine (NORVASC) 5 MG tablet TAKE 1 TABLET EVERY DAY 90 tablet 3     ASPIRIN 81 MG OR TABS 1 tab po QD (Once per day) 100 3     atorvastatin (LIPITOR) 40 MG tablet One tablet daily 90 tablet 3     blood glucose monitoring (ACCU-CHEK FASTCLIX) lancets Use 1 needle 2-3 times 250 each 3     Cholecalciferol (VITAMIN D PO) Take by mouth daily       metFORMIN (GLUCOPHAGE) 500 MG tablet Take 1 tablet (500 mg) by mouth 2 times daily (with meals) 180 tablet 3     metoprolol tartrate (LOPRESSOR) 50 MG tablet TAKE 2 TABLETS IN THE MORNING  AND TAKE 1 TABLET EVERY NIGHT 270 tablet 3     omeprazole (PRILOSEC) 20 MG DR capsule TAKE 1 CAPSULE EVERY DAY 90 capsule 3     order for DME Equipment ordered: RESMED Auto PAP Mask type: Full face  Settings: 8-15 cm h2o       potassium chloride ER (KLOR-CON M) 20 MEQ CR tablet TAKE 1 TABLET THREE TIMES DAILY  (DUE  FOR  LAB  CHECK  BEFORE  NEXT REFILL) 270 tablet 1     Allergies   Allergen Reactions     Sulfa Drugs      rash       Reviewed and updated as needed this visit by Provider         Review of Systems   Constitutional, HEENT, cardiovascular, pulmonary, gi and gu systems are negative, except as otherwise noted.      Objective    There were no vitals taken for this visit.  There is no height or weight on file to calculate BMI.  Physical Exam   No acute distress  Right inguinal area as a reducible hernia  up in the inguinal canal  Lower down there is a 1 cm hard mobile node on the right side    Diagnostic Test Results:  Labs reviewed in Epic        Assessment & Plan       ICD-10-CM    1. Non-recurrent unilateral inguinal hernia without obstruction or gangrene  K40.90    2. Colonic thickening  K63.9    3. Enlarged lymph nodes  R59.9    4. History of melanoma  Z85.820      Patient comes in due to a new squishy mass on the right side this is above the nodule or node that as he had noted earlier.  This appears to be a right inguinal hernia.  It is reducible is not giving him pain.  Plan to continue with the biopsy on Friday.  Then can discuss this with general surgery as he continues the work-up for the colon thickening and lymph node on the right side.    Patient is still awaiting consultation at the [facility] forms were sent down there but he has not heard back from them yet.  Colonoscopy is at the end of the month.        No follow-ups on file.    Zaid Oneill MD  [facility]

## 2020-07-16 ENCOUNTER — OFFICE VISIT (OUTPATIENT)
Dept: DERMATOLOGY | Facility: CLINIC | Age: 80
End: 2020-07-16
Payer: COMMERCIAL

## 2020-07-16 DIAGNOSIS — Z85.820 HISTORY OF MELANOMA: Primary | ICD-10-CM

## 2020-07-16 DIAGNOSIS — D18.01 CHERRY ANGIOMA: ICD-10-CM

## 2020-07-16 DIAGNOSIS — L57.8 SUN-DAMAGED SKIN: ICD-10-CM

## 2020-07-16 DIAGNOSIS — D22.9 MULTIPLE BENIGN NEVI: ICD-10-CM

## 2020-07-16 DIAGNOSIS — L82.1 SEBORRHEIC KERATOSIS: ICD-10-CM

## 2020-07-16 DIAGNOSIS — R22.9 SUBCUTANEOUS NODULE: ICD-10-CM

## 2020-07-16 DIAGNOSIS — L81.4 SOLAR LENTIGO: ICD-10-CM

## 2020-07-16 LAB
SARS-COV-2 RNA SPEC QL NAA+PROBE: NOT DETECTED
SPECIMEN SOURCE: NORMAL

## 2020-07-16 PROCEDURE — 99203 OFFICE O/P NEW LOW 30 MIN: CPT | Performed by: DERMATOLOGY

## 2020-07-16 ASSESSMENT — PAIN SCALES - GENERAL: PAINLEVEL: NO PAIN (0)

## 2020-07-16 NOTE — PROGRESS NOTES
Merlin J Koppendrayer's goals for this visit include:   Chief Complaint   Patient presents with     Skin Check     Hx Melanoma 2015. No family hx SC. Not immunosuppressed. No areas of concern       He requests these members of his care team be copied on today's visit information: Yes    PCP: Zaid Oneill    Referring Provider:  No referring provider defined for this encounter.    There were no vitals taken for this visit.    Do you need any medication refills at today's visit? No  Mirian Camargo LPN

## 2020-07-16 NOTE — PROGRESS NOTES
[facility] Dermatology Note      Dermatology Problem List:  1. 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 Pathology showed 0.8mm depth, no ulceration, no mitoses.Treated with transmettatarsal amputation. No SLNB was completed.  -Developed palpable right thigh mass 6/2020. PET scan showed hypermetatolic activity in R inguinal nodes. Plan for core needle biopsy. There was also focal hypermetabolism in the distal small bowel and in the rectum about 5 cm from liver. Plan for endoscopy to evaluate.    CC:   Chief Complaint   Patient presents with     Skin Check     Hx Melanoma 2015. No family hx SC. Not immunosuppressed. No areas of concern         Encounter Date: Jul 16, 2020    History of Present Illness:  Mr. Merlin J Koppendrayer is a 79 year old male with history of acral melanoma on the right foot and possible recurrence to lymph nodes presents for skin check.    Melanoma was diagnosed and treated with amputation in 2015. Noticed new swelling in right groin in June 2020. PET showed enlarged lymph nodes here and activity in the distal small bowl and rectum. Plan is for core biopsy (scheduled tomorrow) and endoscopy at the end of the month to evaluate rectal lesions.    Merlin notes that he followed with a dermatologist at [place] for two years after his melanoma diagnosis. He has not had a skin check recently. He is very diligent with sun protection (applies susncreen three times a day, sometimes more in the summer), but he does like to be outside a lot. 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 He has tried using a wide brim hat but it didn't work with his ear covers. He wears a ball cap now. He wears short sleeves almost all the time.    Today, he does not have any specific skin lesions of concern. He denies anything tenderr or not healing.    No other  concerns.    Past Medical History:   Patient Active Problem List   Diagnosis     Hearing loss     Inguinal hernia     Prostatitis     Pyelonephritis     HYPERLIPIDEMIA LDL GOAL <130     Advanced directives, counseling/discussion     MICHELLE (obstructive sleep apnea)     Anxiety attack     Type 2 diabetes mellitus without complications (H)     Gastroesophageal reflux disease without esophagitis     Essential hypertension with goal blood pressure less than 140/90     Acute right-sided low back pain with right-sided sciatica     Triceps strain, initial encounter     Past Medical History:   Diagnosis Date     Amblyopia of eye, left      Bilateral cataracts 2016     Diabetes (H)      Diabetic eye exam (H) 12/17/14     Esophageal reflux      Hypersomnia with sleep apnea, unspecified      Inguinal hernia without mention of obstruction or gangrene, unilateral or unspecified, (not specified as recurrent)     s/p repair     Melanoma (H) 07/10/2015    web space between 2 & 3 toe, bx to periphery of margin.  Amputation of 2&3 toe, margins clear     Mixed hyperlipidemia      Other isolated or specific phobias     claustrophobia     Presbyopia      Prostatitis, unspecified      Pyelonephritis, unspecified      Unspecified essential hypertension      Unspecified hearing loss      Past Surgical History:   Procedure Laterality Date     AMPUTATE TOE(S) Right 7/31/2015    Procedure: AMPUTATE TOE(S);  Surgeon: Neal Slater MD;  Location:  OR     C LAP,HERNIA REPAIR PROC,UNLIST       cataract extraction & yag laser capsulotomy Bilateral 2016     COLONOSCOPY  04/15/09     COLONOSCOPY N/A 11/1/2017    Procedure: COLONOSCOPY;  Colonoscopy;  Surgeon: Chapin Rodriguez MD;  Location:  GI     EXCISE MASS FOOT Right 7/10/2015    Procedure: EXCISE MASS FOOT;  Surgeon: Nickolas Farah DPM;  Location:  OR     REPAIR SYNDACTYLY FOOT Right 7/10/2015    Procedure: REPAIR SYNDACTYLY FOOT;  Surgeon: Nickolas Farah DPM;  Location:  [facility] OR       Social History:  Patient reports that he quit smoking about 46 years ago. He has never used smokeless tobacco. He reports current alcohol use of about 4.0 standard drinks of alcohol per week. He reports that he does not use drugs.  with children and grandchildren. 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 Wears sunscreen daily in the summer, not daily in the winter.    Family History:  Negative for melanoma.     Medications:  Current Outpatient Medications   Medication Sig Dispense Refill     ACCU-CHEK SMARTVIEW test strip USE 1 STRIP TO CHECK GLUCOSE TWICE DAILY (VARY  THE  TIME  OF  DAY) 100 strip 1     aMILoride-hydrochlorothiazide (MODURETIC) 5-50 MG TABS per tablet TAKE 1 TABLET EVERY DAY 90 tablet 3     amLODIPine (NORVASC) 5 MG tablet TAKE 1 TABLET EVERY DAY 90 tablet 3     ASPIRIN 81 MG OR TABS 1 tab po QD (Once per day) 100 3     atorvastatin (LIPITOR) 40 MG tablet One tablet daily 90 tablet 3     blood glucose monitoring (ACCU-CHEK FASTCLIX) lancets Use 1 needle 2-3 times 250 each 3     Cholecalciferol (VITAMIN D PO) Take by mouth daily       metFORMIN (GLUCOPHAGE) 500 MG tablet Take 1 tablet (500 mg) by mouth 2 times daily (with meals) 180 tablet 3     metoprolol tartrate (LOPRESSOR) 50 MG tablet TAKE 2 TABLETS IN THE MORNING  AND TAKE 1 TABLET EVERY NIGHT 270 tablet 3     omeprazole (PRILOSEC) 20 MG DR capsule TAKE 1 CAPSULE EVERY DAY 90 capsule 3     order for DME Equipment ordered: RESMED Auto PAP Mask type: Full face  Settings: 8-15 cm h2o       potassium chloride ER (KLOR-CON M) 20 MEQ CR tablet TAKE 1 TABLET THREE TIMES DAILY  (DUE  FOR  LAB  CHECK  BEFORE  NEXT REFILL) 270 tablet 1     Allergies   Allergen Reactions     Sulfa Drugs      rash         Review of Systems:  -Constitutional: The patient is feeling generally well. No fevers/chills, shortness of breath, cough, unintended weight loss.  -Skin: As above in HPI. No additional skin concerns.    Physical exam:  Vitals:  There were no vitals taken for this visit.  GEN: This is a well developed, well-nourished male in no acute distress, in a pleasant mood.    SKIN: Total skin excluding the undergarment areas was performed. The exam included the head/face, neck, both arms, chest, back, abdomen, both legs, digits and/or nails. Declined genital concerns.  -Fournier skin type: II  -Tanned exposed skin, especially on the arms and neck.  -Solar lentigines in sun exposed areas. Several on the face without atypia on dermoscopy.  -Cherry angiomas on the trunk.  -Few seborrheic keratoses on the trunk.  -Very few true nevi. 30-40 in all. A mix of flat brown macules and pink papules. All uniform and without atypia on dermoscopy.  -Well healed amputation scar at right 2nd and 3rd metatarsals. No pigment recurrence or nodularity  -No other lesions of concern on areas examined.   LYMPH NODES: No submandibular, cervical, supraclavicular, axillary, or popliteal lymphadenopathy palpable on examination. There is a fixed, firm 2-3cm nodule on right anterior proximal thigh just inferior to the iliac chain.      Impression/Plan:  1. Acral melanoma, right plantar foot underneath second toe per patient. Initially diagnosed in 2015, now with palpable lymph node in the R inguinal chain concerning for recurrence. Acivity seen on PET as well. Plan for core biopsy tomorrow to get diagnosis. Plan for endoscopy to evaluate rectal activity seen on PET. Plan for serial PETs to monitor small bowel activity. Explained role  Of dermatology in care. Discussed risk of additional primary melanomas. Explained routine schedule for follow up examinations in office and need for self skin checks monthly.   - ABCDs of melanoma were discussed and self skin checks were advised.   - Sun precaution was advised including the use of sunscreens of SPF 30 or higher, sun protective clothing, and avoidance of tanning beds.  - Recommended yearly dental and ophthalmology  examinations.  - Recommended first degree relatives get yearly skin exams as well.  - Will plan to see patient every 3 months if he is upstage to stage III disease    2. Sun damaged skin, solar lentigines: Discussed strategies to allow patient to enjoy time outside safely. Recommended sun sleeves with mowing.    - Sun precaution was advised including the use of sun screens of SPF 30 or higher, sun protective clothing, and avoidance of tanning beds.    3. Benign findings including: SKs, cherry angiomas  - No further intervention required. Patient to report changes.     4. Multiple clinically benign nevi, no atypia  - ABCDs of melanoma were discussed and self skin checks were advised.     Follow-up in 3 months, earlier for new or changing lesions.       Staff Involved:  Staff Only    Magalie Bell MD    Department of Dermatology  [facility]: Phone: [phone], Fax:[phone]  [facility]: Phone: [phone], Fax: [phone]

## 2020-07-16 NOTE — LETTER
7/16/2020         RE: Merlin J Koppendrayer  [address] [place] [address]        Dear Colleague,    Thank you for referring your patient, Merlin J Koppendrayer, to the [facility]. Please see a copy of my visit note below.    [facility] Dermatology Note      Dermatology Problem List:  1. 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 Pathology showed 0.8mm depth, no ulceration, no mitoses.Treated with transmettatarsal amputation. No SLNB was completed.  -Developed palpable right thigh mass 6/2020. PET scan showed hypermetatolic activity in R inguinal nodes. Plan for core needle biopsy. There was also focal hypermetabolism in the distal small bowel and in the rectum about 5 cm from liver. Plan for endoscopy to evaluate.    CC:   Chief Complaint   Patient presents with     Skin Check     Hx Melanoma 2015. No family hx SC. Not immunosuppressed. No areas of concern         Encounter Date: Jul 16, 2020    History of Present Illness:  Mr. Merlin J Koppendrayer is a 79 year old male with history of acral melanoma on the right foot and possible recurrence to lymph nodes presents for skin check.    Melanoma was diagnosed and treated with amputation in 2015. Noticed new swelling in right groin in June 2020. PET showed enlarged lymph nodes here and activity in the distal small bowl and rectum. Plan is for core biopsy (scheduled tomorrow) and endoscopy at the end of the month to evaluate rectal lesions.    Merlin notes that he followed with a dermatologist at [place] for two years after his melanoma diagnosis. He has not had a skin check recently. He is very diligent with sun protection (applies susncreen three times a day, sometimes more in the summer), but he does like to be outside a lot. 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 He has tried using a wide  brim hat but it didn't work with his ear covers. He wears a ball cap now. He wears short sleeves almost all the time.    Today, he does not have any specific skin lesions of concern. He denies anything tenderr or not healing.    No other concerns.    Past Medical History:   Patient Active Problem List   Diagnosis     Hearing loss     Inguinal hernia     Prostatitis     Pyelonephritis     HYPERLIPIDEMIA LDL GOAL <130     Advanced directives, counseling/discussion     MICHELLE (obstructive sleep apnea)     Anxiety attack     Type 2 diabetes mellitus without complications (H)     Gastroesophageal reflux disease without esophagitis     Essential hypertension with goal blood pressure less than 140/90     Acute right-sided low back pain with right-sided sciatica     Triceps strain, initial encounter     Past Medical History:   Diagnosis Date     Amblyopia of eye, left      Bilateral cataracts 2016     Diabetes (H)      Diabetic eye exam (H) 12/17/14     Esophageal reflux      Hypersomnia with sleep apnea, unspecified      Inguinal hernia without mention of obstruction or gangrene, unilateral or unspecified, (not specified as recurrent)     s/p repair     Melanoma (H) 07/10/2015    web space between 2 & 3 toe, bx to periphery of margin.  Amputation of 2&3 toe, margins clear     Mixed hyperlipidemia      Other isolated or specific phobias     claustrophobia     Presbyopia      Prostatitis, unspecified      Pyelonephritis, unspecified      Unspecified essential hypertension      Unspecified hearing loss      Past Surgical History:   Procedure Laterality Date     AMPUTATE TOE(S) Right 7/31/2015    Procedure: AMPUTATE TOE(S);  Surgeon: Neal Slater MD;  Location:  OR     C LAP,HERNIA REPAIR PROC,UNLIST       cataract extraction & yag laser capsulotomy Bilateral 2016     COLONOSCOPY  04/15/09     COLONOSCOPY N/A 11/1/2017    Procedure: COLONOSCOPY;  Colonoscopy;  Surgeon: Chapin Rodriguez MD;  Location:  GI     EXCISE  MASS FOOT Right 7/10/2015    Procedure: EXCISE MASS FOOT;  Surgeon: Nickolas Farah DPM;  Location: [facility] OR     REPAIR SYNDACTYLY FOOT Right 7/10/2015    Procedure: REPAIR SYNDACTYLY FOOT;  Surgeon: Nickolas Farah DPM;  Location: [facility] OR       Social History:  Patient reports that he quit smoking about 46 years ago. He has never used smokeless tobacco. He reports current alcohol use of about 4.0 standard drinks of alcohol per week. He reports that he does not use drugs.  with children and grandchildren. 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 Wears sunscreen daily in the summer, not daily in the winter.    Family History:  Negative for melanoma.     Medications:  Current Outpatient Medications   Medication Sig Dispense Refill     ACCU-CHEK SMARTVIEW test strip USE 1 STRIP TO CHECK GLUCOSE TWICE DAILY (VARY  THE  TIME  OF  DAY) 100 strip 1     aMILoride-hydrochlorothiazide (MODURETIC) 5-50 MG TABS per tablet TAKE 1 TABLET EVERY DAY 90 tablet 3     amLODIPine (NORVASC) 5 MG tablet TAKE 1 TABLET EVERY DAY 90 tablet 3     ASPIRIN 81 MG OR TABS 1 tab po QD (Once per day) 100 3     atorvastatin (LIPITOR) 40 MG tablet One tablet daily 90 tablet 3     blood glucose monitoring (ACCU-CHEK FASTCLIX) lancets Use 1 needle 2-3 times 250 each 3     Cholecalciferol (VITAMIN D PO) Take by mouth daily       metFORMIN (GLUCOPHAGE) 500 MG tablet Take 1 tablet (500 mg) by mouth 2 times daily (with meals) 180 tablet 3     metoprolol tartrate (LOPRESSOR) 50 MG tablet TAKE 2 TABLETS IN THE MORNING  AND TAKE 1 TABLET EVERY NIGHT 270 tablet 3     omeprazole (PRILOSEC) 20 MG DR capsule TAKE 1 CAPSULE EVERY DAY 90 capsule 3     order for DME Equipment ordered: RESMED Auto PAP Mask type: Full face  Settings: 8-15 cm h2o       potassium chloride ER (KLOR-CON M) 20 MEQ CR tablet TAKE 1 TABLET THREE TIMES DAILY  (DUE  FOR  LAB  CHECK  BEFORE  NEXT REFILL) 270 tablet 1     Allergies   Allergen Reactions     Sulfa Drugs       rash         Review of Systems:  -Constitutional: The patient is feeling generally well. No fevers/chills, shortness of breath, cough, unintended weight loss.  -Skin: As above in HPI. No additional skin concerns.    Physical exam:  Vitals: There were no vitals taken for this visit.  GEN: This is a well developed, well-nourished male in no acute distress, in a pleasant mood.    SKIN: Total skin excluding the undergarment areas was performed. The exam included the head/face, neck, both arms, chest, back, abdomen, both legs, digits and/or nails. Declined genital concerns.  -Fournier skin type: II  -Tanned exposed skin, especially on the arms and neck.  -Solar lentigines in sun exposed areas. Several on the face without atypia on dermoscopy.  -Cherry angiomas on the trunk.  -Few seborrheic keratoses on the trunk.  -Very few true nevi. 30-40 in all. A mix of flat brown macules and pink papules. All uniform and without atypia on dermoscopy.  -Well healed amputation scar at right 2nd and 3rd metatarsals. No pigment recurrence or nodularity  -No other lesions of concern on areas examined.   LYMPH NODES: No submandibular, cervical, supraclavicular, axillary, or popliteal lymphadenopathy palpable on examination. There is a fixed, firm 2-3cm nodule on right anterior proximal thigh just inferior to the iliac chain.      Impression/Plan:  1. Acral melanoma, right plantar foot underneath second toe per patient. Initially diagnosed in 2015, now with palpable lymph node in the R inguinal chain concerning for recurrence. Acivity seen on PET as well. Plan for core biopsy tomorrow to get diagnosis. Plan for endoscopy to evaluate rectal activity seen on PET. Plan for serial PETs to monitor small bowel activity. Explained role  Of dermatology in care. Discussed risk of additional primary melanomas. Explained routine schedule for follow up examinations in office and need for self skin checks monthly.   - ABCDs of melanoma were  discussed and self skin checks were advised.   - Sun precaution was advised including the use of sunscreens of SPF 30 or higher, sun protective clothing, and avoidance of tanning beds.  - Recommended yearly dental and ophthalmology examinations.  - Recommended first degree relatives get yearly skin exams as well.  - Will plan to see patient every 3 months if he is upstage to stage III disease    2. Sun damaged skin, solar lentigines: Discussed strategies to allow patient to enjoy time outside safely. Recommended sun sleeves with mowing.    - Sun precaution was advised including the use of sun screens of SPF 30 or higher, sun protective clothing, and avoidance of tanning beds.    3. Benign findings including: SKs, cherry angiomas  - No further intervention required. Patient to report changes.     4. Multiple clinically benign nevi, no atypia  - ABCDs of melanoma were discussed and self skin checks were advised.     Follow-up in 3 months, earlier for new or changing lesions.       Staff Involved:  Staff Only    Magalie Bell MD    Department of Dermatology  [facility]: Phone: [phone], Fax:[phone]  [facility]: Phone: [phone], Fax: [phone]              Merlin J Koppendrayer's goals for this visit include:   Chief Complaint   Patient presents with     Skin Check     Hx Melanoma 2015. No family hx SC. Not immunosuppressed. No areas of concern       He requests these members of his care team be copied on today's visit information: Yes    PCP: Zaid Oneill    Referring Provider:  No referring provider defined for this encounter.    There were no vitals taken for this visit.    Do you need any medication refills at today's visit? No  Mirian Camargo LPN        Again, thank you for allowing me to participate in the care of your patient.        Sincerely,        Magalie Bell  MD

## 2020-07-17 ENCOUNTER — HOSPITAL ENCOUNTER (OUTPATIENT)
Dept: ULTRASOUND IMAGING | Facility: CLINIC | Age: 80
Discharge: HOME | End: 2020-07-17
Attending: CLINICAL NURSE SPECIALIST | Admitting: CLINICAL NURSE SPECIALIST
Payer: MEDICARE

## 2020-07-17 DIAGNOSIS — R59.9 ENLARGED LYMPH NODES: ICD-10-CM

## 2020-07-17 PROCEDURE — 27211108 US BIOPSY CORE LYMPH NODE

## 2020-07-17 PROCEDURE — 25000125 ZZHC RX 250: Performed by: CLINICAL NURSE SPECIALIST

## 2020-07-17 PROCEDURE — 88342 IMHCHEM/IMCYTCHM 1ST ANTB: CPT | Mod: 26 | Performed by: CLINICAL NURSE SPECIALIST

## 2020-07-17 PROCEDURE — 88341 IMHCHEM/IMCYTCHM EA ADD ANTB: CPT | Performed by: CLINICAL NURSE SPECIALIST

## 2020-07-17 PROCEDURE — 88342 IMHCHEM/IMCYTCHM 1ST ANTB: CPT | Performed by: CLINICAL NURSE SPECIALIST

## 2020-07-17 PROCEDURE — 88305 TISSUE EXAM BY PATHOLOGIST: CPT | Performed by: CLINICAL NURSE SPECIALIST

## 2020-07-17 PROCEDURE — 00000159 ZZHCL STATISTIC H-SEND OUTS PREP: Performed by: CLINICAL NURSE SPECIALIST

## 2020-07-17 PROCEDURE — 88341 IMHCHEM/IMCYTCHM EA ADD ANTB: CPT | Mod: 26 | Performed by: CLINICAL NURSE SPECIALIST

## 2020-07-17 PROCEDURE — 88305 TISSUE EXAM BY PATHOLOGIST: CPT | Mod: 26 | Performed by: CLINICAL NURSE SPECIALIST

## 2020-07-17 RX ORDER — LIDOCAINE HYDROCHLORIDE 10 MG/ML
10 INJECTION, SOLUTION EPIDURAL; INFILTRATION; INTRACAUDAL; PERINEURAL ONCE
Status: COMPLETED | OUTPATIENT
Start: 2020-07-17 | End: 2020-07-17

## 2020-07-17 RX ADMIN — LIDOCAINE HYDROCHLORIDE 7 ML: 10 INJECTION, SOLUTION EPIDURAL; INFILTRATION; INTRACAUDAL; PERINEURAL at 09:21

## 2020-07-21 LAB — COPATH REPORT: NORMAL

## 2020-07-23 ENCOUNTER — TELEPHONE (OUTPATIENT)
Dept: INTERNAL MEDICINE | Facility: CLINIC | Age: 80
End: 2020-07-23

## 2020-07-23 NOTE — TELEPHONE ENCOUNTER
Reason for Call: FYI patient update    Detailed comments: Pt's wife calling to update Dr Oneill that pt's cancer is back. Wife states they got the results and are scheduled at  [place] for oncology on August 6th.     Phone Number Patient can be reached at: Home number on file [phone] (home)    Best Time:     Can we leave a detailed message on this number? YES    Call taken on 7/23/2020 at 3:00 PM by Delmy Pittman

## 2020-07-26 ENCOUNTER — MYC MEDICAL ADVICE (OUTPATIENT)
Dept: INTERNAL MEDICINE | Facility: CLINIC | Age: 80
End: 2020-07-26

## 2020-07-28 DIAGNOSIS — Z11.59 ENCOUNTER FOR SCREENING FOR OTHER VIRAL DISEASES: ICD-10-CM

## 2020-07-28 PROCEDURE — U0003 INFECTIOUS AGENT DETECTION BY NUCLEIC ACID (DNA OR RNA); SEVERE ACUTE RESPIRATORY SYNDROME CORONAVIRUS 2 (SARS-COV-2) (CORONAVIRUS DISEASE [COVID-19]), AMPLIFIED PROBE TECHNIQUE, MAKING USE OF HIGH THROUGHPUT TECHNOLOGIES AS DESCRIBED BY CMS-2020-01-R: HCPCS | Performed by: INTERNAL MEDICINE

## 2020-07-29 LAB
SARS-COV-2 RNA SPEC QL NAA+PROBE: NOT DETECTED
SPECIMEN SOURCE: NORMAL

## 2020-07-31 ENCOUNTER — SURGERY (OUTPATIENT)
Age: 80
End: 2020-07-31
Payer: COMMERCIAL

## 2020-07-31 ENCOUNTER — HOSPITAL ENCOUNTER (OUTPATIENT)
Facility: CLINIC | Age: 80
Discharge: HOME | End: 2020-07-31
Attending: INTERNAL MEDICINE | Admitting: INTERNAL MEDICINE
Payer: MEDICARE

## 2020-07-31 VITALS
HEART RATE: 70 BPM | RESPIRATION RATE: 14 BRPM | DIASTOLIC BLOOD PRESSURE: 82 MMHG | SYSTOLIC BLOOD PRESSURE: 147 MMHG | OXYGEN SATURATION: 95 % | TEMPERATURE: 98 F

## 2020-07-31 LAB — COLONOSCOPY: NORMAL

## 2020-07-31 PROCEDURE — G0121 COLON CA SCRN NOT HI RSK IND: HCPCS | Performed by: INTERNAL MEDICINE

## 2020-07-31 PROCEDURE — 25000128 H RX IP 250 OP 636: Performed by: INTERNAL MEDICINE

## 2020-07-31 PROCEDURE — 99153 MOD SED SAME PHYS/QHP EA: CPT | Performed by: INTERNAL MEDICINE

## 2020-07-31 PROCEDURE — 45378 DIAGNOSTIC COLONOSCOPY: CPT | Performed by: INTERNAL MEDICINE

## 2020-07-31 PROCEDURE — 99152 MOD SED SAME PHYS/QHP 5/>YRS: CPT | Performed by: INTERNAL MEDICINE

## 2020-07-31 PROCEDURE — 25000125 ZZHC RX 250: Performed by: INTERNAL MEDICINE

## 2020-07-31 PROCEDURE — G0500 MOD SEDAT ENDO SERVICE >5YRS: HCPCS | Performed by: INTERNAL MEDICINE

## 2020-07-31 RX ORDER — ONDANSETRON 2 MG/ML
4 INJECTION INTRAMUSCULAR; INTRAVENOUS
Status: DISCONTINUED | OUTPATIENT
Start: 2020-07-31 | End: 2020-07-31 | Stop reason: HOSPADM

## 2020-07-31 RX ORDER — LIDOCAINE 40 MG/G
CREAM TOPICAL
Status: DISCONTINUED | OUTPATIENT
Start: 2020-07-31 | End: 2020-07-31 | Stop reason: HOSPADM

## 2020-07-31 RX ORDER — FLUMAZENIL 0.1 MG/ML
0.2 INJECTION, SOLUTION INTRAVENOUS
Status: DISCONTINUED | OUTPATIENT
Start: 2020-07-31 | End: 2020-07-31 | Stop reason: HOSPADM

## 2020-07-31 RX ORDER — NALOXONE HYDROCHLORIDE 0.4 MG/ML
.1-.4 INJECTION, SOLUTION INTRAMUSCULAR; INTRAVENOUS; SUBCUTANEOUS
Status: DISCONTINUED | OUTPATIENT
Start: 2020-07-31 | End: 2020-07-31 | Stop reason: HOSPADM

## 2020-07-31 RX ORDER — ONDANSETRON 4 MG/1
4 TABLET, ORALLY DISINTEGRATING ORAL EVERY 6 HOURS PRN
Status: DISCONTINUED | OUTPATIENT
Start: 2020-07-31 | End: 2020-07-31 | Stop reason: HOSPADM

## 2020-07-31 RX ORDER — FENTANYL CITRATE 50 UG/ML
INJECTION, SOLUTION INTRAMUSCULAR; INTRAVENOUS PRN
Status: DISCONTINUED | OUTPATIENT
Start: 2020-07-31 | End: 2020-07-31 | Stop reason: HOSPADM

## 2020-07-31 RX ORDER — ONDANSETRON 2 MG/ML
4 INJECTION INTRAMUSCULAR; INTRAVENOUS EVERY 6 HOURS PRN
Status: DISCONTINUED | OUTPATIENT
Start: 2020-07-31 | End: 2020-07-31 | Stop reason: HOSPADM

## 2020-07-31 RX ADMIN — MIDAZOLAM 1 MG: 1 INJECTION INTRAMUSCULAR; INTRAVENOUS at 12:27

## 2020-07-31 RX ADMIN — FENTANYL CITRATE 25 MCG: 50 INJECTION, SOLUTION INTRAMUSCULAR; INTRAVENOUS at 12:30

## 2020-07-31 RX ADMIN — FENTANYL CITRATE 50 MCG: 50 INJECTION, SOLUTION INTRAMUSCULAR; INTRAVENOUS at 12:25

## 2020-07-31 RX ADMIN — MIDAZOLAM 2 MG: 1 INJECTION INTRAMUSCULAR; INTRAVENOUS at 12:25

## 2020-07-31 RX ADMIN — MIDAZOLAM 1 MG: 1 INJECTION INTRAMUSCULAR; INTRAVENOUS at 12:30

## 2020-07-31 RX ADMIN — LIDOCAINE HYDROCHLORIDE 1 ML: 10 INJECTION, SOLUTION EPIDURAL; INFILTRATION; INTRACAUDAL; PERINEURAL at 12:10

## 2020-07-31 RX ADMIN — MIDAZOLAM 1 MG: 1 INJECTION INTRAMUSCULAR; INTRAVENOUS at 12:26

## 2020-07-31 NOTE — CONSULTS
[facility] GI Pre-Procedure Physical Assessment    Merlin J Koppendrayer MRN# [id number]   Age: 79 year old YOB: 1940      Date of Surgery: 2020  Location [facility]      Date of Exam 2020 Facility (Same day)       Home clinic: [facility]  Primary care provider: Zaid Oneill         Active problem list:   Patient Active Problem List   Diagnosis     Hearing loss     Inguinal hernia     Prostatitis     Pyelonephritis     HYPERLIPIDEMIA LDL GOAL <130     Advanced directives, counseling/discussion     MICHELLE (obstructive sleep apnea)     Anxiety attack     Type 2 diabetes mellitus without complications (H)     Gastroesophageal reflux disease without esophagitis     Essential hypertension with goal blood pressure less than 140/90     Acute right-sided low back pain with right-sided sciatica     Triceps strain, initial encounter            Medications (include herbals and vitamins):   Any Plavix use in the last 7 days?  No     Current Facility-Administered Medications   Medication     lidocaine (LMX4) kit     lidocaine 1 % 0.1-1 mL     ondansetron (ZOFRAN) injection 4 mg     sodium chloride (PF) 0.9% PF flush 3 mL     sodium chloride (PF) 0.9% PF flush 3 mL             Allergies:      Allergies   Allergen Reactions     Sulfa Drugs      rash     Allergy to Latex?  No  Allergy to tape?    No          Social History:     Social History     Tobacco Use     Smoking status: Former Smoker     Last attempt to quit: 1974     Years since quittin.6     Smokeless tobacco: Never Used     Tobacco comment: Quit     Substance Use Topics     Alcohol use: Yes     Alcohol/week: 4.0 standard drinks     Types: 4 Standard drinks or equivalent per week     Frequency: 2-4 times a month            Physical Exam:   All vitals have been reviewed  Blood pressure (!) 165/85, temperature 98  F (36.7  C), temperature source Oral, resp. rate 18, SpO2 97 %.  Airway assessment:    Patient is able to open mouth wide  Patient is able to stick out tongue      Lungs:   No increased work of breathing, good air exchange, clear to auscultation bilaterally, no crackles or wheezing      Cardiovascular:   Normal apical impulse, regular rate and rhythm, normal S1 and S2, no S3 or S4, and no murmur noted           Lab / Radiology Results:   All laboratory data reviewed          Assessment:   Appropriately NPO  Chief complaint or anatomic assessment of involved area: abnormal PET scan of colon, FHx colon cancer         Plan:   Moderate (conscious) sedation     Patient's active problems diagnostically and therapeutically optimized for the planned procedure  Risks, benefits, alternatives to sedation and blood explained and consent obtained  Risks, benefits, alternatives to procedure explained and consent obtained  Orders and progress notes are in the chart  Discharge from Phase 1 and / or Phase 2 recovery when patient meets criteria    I have reviewed the history and physical, lab finding(s), diagnostic data, medicaitons, and the plan for sedation.  I have determined this patient to be an appropriate candidate for the planned sedation / procedure and have reassessed the patient immediately prior to sedation / procedure.    I have personally and medically directed the administration of medications used.    Chapin Rodriguez MD

## 2020-07-31 NOTE — DISCHARGE INSTRUCTIONS
[facility]    Home Care Following Endoscopy          Activity:    You have just undergone an endoscopic procedure usually performed with conscious sedation.  Do not work or operate machinery (including a car) for at least 12 hours.      I encourage you to walk and attempt to pass this air as soon as possible.    Diet:    Return to the diet you were on before your procedure but eat lightly for the first 12-24 hours.    Drink plenty of water.    Resume any regular medications unless otherwise advised by your physician.  Please begin any new medication prescribed as a result of your procedure as directed by your physician.     Pain:    You may take Tylenol as needed for pain.  Expected Recovery:    You can expect some mild abdominal fullness and/or discomfort due to the air used to inflate your intestinal tract.     Call Your Physician if You Have:      After Colonoscopy:  o Worsening persisting abdominal pain which is worse with activity.  o Fevers (>101 degrees F), chills or shakes.  o Passage of continued blood with bowel movements.   Any questions or concerns about your recovery, please call [phone] or after hours [phone] Nurse Advice Line.

## 2020-08-06 ENCOUNTER — TRANSFERRED RECORDS (OUTPATIENT)
Dept: HEALTH INFORMATION MANAGEMENT | Facility: CLINIC | Age: 80
End: 2020-08-06

## 2020-08-07 ENCOUNTER — TRANSFERRED RECORDS (OUTPATIENT)
Dept: HEALTH INFORMATION MANAGEMENT | Facility: CLINIC | Age: 80
End: 2020-08-07

## 2020-08-07 LAB
ALT SERPL-CCNC: 31 U/L (ref 7–55)
AST SERPL-CCNC: 18 U/L (ref 8–48)
CREAT SERPL-MCNC: 1.2 MG/DL (ref 0.74–1.35)
GFR SERPL CREATININE-BSD FRML MDRD: 58 ML/MIN/BSA
GLUCOSE SERPL-MCNC: 116 MG/DL (ref 70–140)
INR PPP: 1.2
POTASSIUM SERPL-SCNC: 4.5 MMOL/L (ref 3.6–5.2)
TSH SERPL-ACNC: 2.8 MIU/L (ref 0.3–4.2)

## 2020-08-12 ENCOUNTER — TRANSFERRED RECORDS (OUTPATIENT)
Dept: HEALTH INFORMATION MANAGEMENT | Facility: CLINIC | Age: 80
End: 2020-08-12

## 2020-08-14 ENCOUNTER — TRANSFERRED RECORDS (OUTPATIENT)
Dept: HEALTH INFORMATION MANAGEMENT | Facility: CLINIC | Age: 80
End: 2020-08-14

## 2020-08-17 ENCOUNTER — TELEPHONE (OUTPATIENT)
Dept: INTERNAL MEDICINE | Facility: CLINIC | Age: 80
End: 2020-08-17

## 2020-08-17 NOTE — TELEPHONE ENCOUNTER
Can we check on this and if he can test here or not.  Usually the surgeon or doctor needing him tested orders the test.

## 2020-08-17 NOTE — TELEPHONE ENCOUNTER
Reason for Call: Request for an order or referral:    Order or referral being requested: Going to [place] for cancer treatment 09/11.  Prior to that he needs to have a Covid test per [facility].  Requesting Dr Oneill to order this for them so he can do it here 5 days prior to going [place].    Date needed: at your convenience    Has the patient been seen by the PCP for this problem? YES    Additional comments: Will have to have a covid test everytime he goes to [place] for his cancer treatments as long as this lasts    Phone number Patient can be reached at:  Home number on file [phone] (home)    Best Time:  any    Can we leave a detailed message on this number?  YES    Call taken on 8/17/2020 at 11:04 AM by Diana Fisher

## 2020-09-11 ENCOUNTER — TRANSFERRED RECORDS (OUTPATIENT)
Dept: HEALTH INFORMATION MANAGEMENT | Facility: CLINIC | Age: 80
End: 2020-09-11

## 2020-09-11 LAB
ALT SERPL-CCNC: 31 U/L (ref 7–55)
AST SERPL-CCNC: 19 U/L (ref 8–48)
CREAT SERPL-MCNC: 1.11 MG/DL (ref 0.74–1.35)
GFR SERPL CREATININE-BSD FRML MDRD: 64 ML/MIN/BSA
GLUCOSE SERPL-MCNC: 215 MG/DL (ref 70–140)
POTASSIUM SERPL-SCNC: 4.2 MMOL/L (ref 3.6–5.2)
TSH SERPL-ACNC: 3 MIU/L (ref 0.3–4.2)

## 2020-09-23 DIAGNOSIS — E78.5 HYPERLIPIDEMIA LDL GOAL <130: ICD-10-CM

## 2020-09-23 DIAGNOSIS — E11.9 TYPE 2 DIABETES MELLITUS WITHOUT COMPLICATION (H): ICD-10-CM

## 2020-09-23 RX ORDER — BLOOD SUGAR DIAGNOSTIC
STRIP MISCELLANEOUS
Qty: 100 STRIP | Refills: 5 | Status: SHIPPED | OUTPATIENT
Start: 2020-09-23 | End: 2021-11-18

## 2020-09-23 NOTE — TELEPHONE ENCOUNTER
"  Requested Prescriptions   Pending Prescriptions Disp Refills     ACCU-CHEK SMARTVIEW test strip [Pharmacy Med Name: Accu-Chek SmartView In Vitro Strip]  0     Sig: USE 1 STRIP TO CHECK GLUCOSE TWICE DAILY VARY  THE  TIME  OF  DAY   Last visit 7/15/2020    Diabetic Supplies Protocol Passed - 9/23/2020 10:46 AM        Passed - Medication is active on med list        Passed - Patient is 18 years of age or older        Passed - Recent (6 mo) or future (30 days) visit within the authorizing provider's specialty     Patient had office visit in the last 6 months or has a visit in the next 30 days with authorizing provider.  See \"Patient Info\" tab in inbasket, or \"Choose Columns\" in Meds & Orders section of the refill encounter.             Prescription approved per [facility] Refill Protocol.  Pretty Arcos RN      "

## 2020-10-09 ENCOUNTER — TRANSFERRED RECORDS (OUTPATIENT)
Dept: HEALTH INFORMATION MANAGEMENT | Facility: CLINIC | Age: 80
End: 2020-10-09

## 2020-10-09 LAB
ALT SERPL-CCNC: 32 U/L (ref 7–55)
AST SERPL-CCNC: 19 U/L (ref 8–48)
CREAT SERPL-MCNC: 1.21 MG/DL (ref 0.74–1.35)
GFR SERPL CREATININE-BSD FRML MDRD: 57 ML/MIN/BSA
GLUCOSE SERPL-MCNC: 185 MG/DL (ref 70–140)
POTASSIUM SERPL-SCNC: 4 MMOL/L (ref 3.6–5.2)
TSH SERPL-ACNC: 5.9 MIU/L (ref 0.3–4.2)

## 2020-10-23 DIAGNOSIS — K21.9 GASTROESOPHAGEAL REFLUX DISEASE WITHOUT ESOPHAGITIS: ICD-10-CM

## 2020-11-03 ENCOUNTER — TRANSFERRED RECORDS (OUTPATIENT)
Dept: HEALTH INFORMATION MANAGEMENT | Facility: CLINIC | Age: 80
End: 2020-11-03

## 2020-11-03 LAB
ALT SERPL-CCNC: 29 U/L (ref 7–55)
AST SERPL-CCNC: 18 U/L (ref 8–48)
CREAT SERPL-MCNC: 1.34 MG/DL (ref 0.74–1.35)
GFR SERPL CREATININE-BSD FRML MDRD: 50 ML/MIN/BSA
GLUCOSE SERPL-MCNC: 174 MG/DL (ref 70–140)
POTASSIUM SERPL-SCNC: 4.8 MMOL/L (ref 3.6–5.2)
TSH SERPL-ACNC: 4.2 MIU/L (ref 0.3–4.2)

## 2020-11-17 DIAGNOSIS — I10 ESSENTIAL HYPERTENSION WITH GOAL BLOOD PRESSURE LESS THAN 140/90: ICD-10-CM

## 2020-11-17 DIAGNOSIS — E87.6 HYPOPOTASSEMIA: ICD-10-CM

## 2020-11-17 RX ORDER — POTASSIUM CHLORIDE 1500 MG/1
TABLET, EXTENDED RELEASE ORAL
Qty: 270 TABLET | Refills: 1 | Status: CANCELLED | OUTPATIENT
Start: 2020-11-17

## 2020-11-17 NOTE — TELEPHONE ENCOUNTER
DISREGARD PREVIOUS ENCOUNTER. Pt called to say he found his newer bottle in the back of his cabinet so he does not need a refill at this time. He will go through [employer] when he's ready.

## 2020-11-17 NOTE — TELEPHONE ENCOUNTER
Reason for Call:  Medication or medication refill: Refill    Do you use a [facility]?  Name of the pharmacy and phone number for the current request:  [facility] - [phone]    Name of the medication requested: Potassium    Other request: Pt is out, requesting for this to be sent to [facility] so he can get it sooner. Going through [employer] will take another 2 weeks. Please call to advise.    Can we leave a detailed message on this number? YES    Phone number patient can be reached at: Home number on file [phone] (mobile)    Best Time: Anytime    Call taken on 11/17/2020 at 9:12 AM by Reyna Coyne

## 2020-12-01 ENCOUNTER — TRANSFERRED RECORDS (OUTPATIENT)
Dept: HEALTH INFORMATION MANAGEMENT | Facility: CLINIC | Age: 80
End: 2020-12-01

## 2020-12-01 LAB
ALT SERPL-CCNC: 35 U/L (ref 7–55)
AST SERPL-CCNC: 21 U/L (ref 8–48)
CREAT SERPL-MCNC: 1.23 MG/DL (ref 0.74–1.35)
GFR SERPL CREATININE-BSD FRML MDRD: 56 ML/MIN/BSA
GLUCOSE SERPL-MCNC: 155 MG/DL (ref 70–140)
POTASSIUM SERPL-SCNC: 4.4 MMOL/L (ref 3.6–5.2)
TSH SERPL-ACNC: 3.2 MIU/L (ref 0.3–4.2)

## 2020-12-03 DIAGNOSIS — E87.6 HYPOPOTASSEMIA: ICD-10-CM

## 2020-12-03 DIAGNOSIS — I10 ESSENTIAL HYPERTENSION WITH GOAL BLOOD PRESSURE LESS THAN 140/90: ICD-10-CM

## 2020-12-03 RX ORDER — POTASSIUM CHLORIDE 1500 MG/1
TABLET, EXTENDED RELEASE ORAL
Qty: 270 TABLET | Refills: 1 | Status: SHIPPED | OUTPATIENT
Start: 2020-12-03 | End: 2021-06-04

## 2020-12-03 NOTE — TELEPHONE ENCOUNTER
Routing refill request to provider for review/approval because:  Drug interaction warning    Fay Bermeo RN

## 2020-12-10 DIAGNOSIS — I10 HYPERTENSION GOAL BP (BLOOD PRESSURE) < 140/90: ICD-10-CM

## 2020-12-11 RX ORDER — METOPROLOL TARTRATE 50 MG
TABLET ORAL
Qty: 270 TABLET | Refills: 3 | OUTPATIENT
Start: 2020-12-11

## 2020-12-11 NOTE — TELEPHONE ENCOUNTER
Refused duplicate see script 3/24/20 with 1 year refills.                Jenn Mtz RN  Triage Nurse  [facility], 24 hour nurse line, available by calling clinic at [phone] and following prompts.

## 2020-12-16 RX ORDER — METOPROLOL TARTRATE 50 MG
TABLET ORAL
Qty: 270 TABLET | Refills: 3 | Status: SHIPPED | OUTPATIENT
Start: 2020-12-16 | End: 2022-03-10

## 2020-12-16 NOTE — TELEPHONE ENCOUNTER
Spoke to pt. He called [facility] regarding his metoprolol tartrate (LOPRESSOR) 50 MG tablet meds. They state this is not a duplicate. They need the provider to resubmit the med for approval. For any questions, please contact pt at #[phone].

## 2020-12-17 DIAGNOSIS — E11.9 TYPE 2 DIABETES MELLITUS WITHOUT COMPLICATION, WITHOUT LONG-TERM CURRENT USE OF INSULIN (H): ICD-10-CM

## 2020-12-17 LAB
ALBUMIN SERPL-MCNC: 3.9 G/DL (ref 3.4–5)
ALP SERPL-CCNC: 65 U/L (ref 40–150)
ALT SERPL W P-5'-P-CCNC: 31 U/L (ref 0–70)
ANION GAP SERPL CALCULATED.3IONS-SCNC: 2 MMOL/L (ref 3–14)
AST SERPL W P-5'-P-CCNC: 14 U/L (ref 0–45)
BILIRUB SERPL-MCNC: 0.6 MG/DL (ref 0.2–1.3)
BUN SERPL-MCNC: 16 MG/DL (ref 7–30)
CALCIUM SERPL-MCNC: 9.2 MG/DL (ref 8.5–10.1)
CHLORIDE SERPL-SCNC: 104 MMOL/L (ref 94–109)
CHOLEST SERPL-MCNC: 91 MG/DL
CO2 SERPL-SCNC: 36 MMOL/L (ref 20–32)
CREAT SERPL-MCNC: 1.14 MG/DL (ref 0.66–1.25)
CREAT UR-MCNC: 96 MG/DL
GFR SERPL CREATININE-BSD FRML MDRD: 61 ML/MIN/{1.73_M2}
GLUCOSE SERPL-MCNC: 152 MG/DL (ref 70–99)
HBA1C MFR BLD: 7.5 % (ref 0–5.6)
HDLC SERPL-MCNC: 33 MG/DL
LDLC SERPL CALC-MCNC: 42 MG/DL
MICROALBUMIN UR-MCNC: 6 MG/L
MICROALBUMIN/CREAT UR: 6.23 MG/G CR (ref 0–17)
NONHDLC SERPL-MCNC: 58 MG/DL
POTASSIUM SERPL-SCNC: 4.1 MMOL/L (ref 3.4–5.3)
PROT SERPL-MCNC: 7.2 G/DL (ref 6.8–8.8)
SODIUM SERPL-SCNC: 142 MMOL/L (ref 133–144)
TRIGL SERPL-MCNC: 79 MG/DL

## 2020-12-17 PROCEDURE — 36415 COLL VENOUS BLD VENIPUNCTURE: CPT | Performed by: INTERNAL MEDICINE

## 2020-12-17 PROCEDURE — 80053 COMPREHEN METABOLIC PANEL: CPT | Performed by: INTERNAL MEDICINE

## 2020-12-17 PROCEDURE — 83036 HEMOGLOBIN GLYCOSYLATED A1C: CPT | Performed by: INTERNAL MEDICINE

## 2020-12-17 PROCEDURE — 80061 LIPID PANEL: CPT | Performed by: INTERNAL MEDICINE

## 2020-12-17 PROCEDURE — 82043 UR ALBUMIN QUANTITATIVE: CPT | Performed by: INTERNAL MEDICINE

## 2020-12-30 ENCOUNTER — TRANSFERRED RECORDS (OUTPATIENT)
Dept: HEALTH INFORMATION MANAGEMENT | Facility: CLINIC | Age: 80
End: 2020-12-30

## 2020-12-30 LAB
ALT SERPL-CCNC: 30 U/L (ref 7–55)
AST SERPL-CCNC: 21 U/L (ref 8–48)
CREAT SERPL-MCNC: 1.21 MG/DL (ref 0.74–1.35)
GFR SERPL CREATININE-BSD FRML MDRD: 57 ML/MIN/BSA
GLUCOSE SERPL-MCNC: 135 MG/DL (ref 70–140)
POTASSIUM SERPL-SCNC: 4.6 MMOL/L (ref 3.6–5.2)
TSH SERPL-ACNC: 2.8 MIU/L (ref 0.3–4.2)

## 2021-01-28 ENCOUNTER — TRANSFERRED RECORDS (OUTPATIENT)
Dept: HEALTH INFORMATION MANAGEMENT | Facility: CLINIC | Age: 81
End: 2021-01-28

## 2021-01-28 LAB
ALT SERPL-CCNC: 33 U/L (ref 7–55)
AST SERPL-CCNC: 22 U/L (ref 8–48)
CREAT SERPL-MCNC: 1.37 MG/DL (ref 0.74–1.35)
GFR SERPL CREATININE-BSD FRML MDRD: 49 ML/MIN/BSA
GLUCOSE SERPL-MCNC: 137 MG/DL (ref 70–140)
POTASSIUM SERPL-SCNC: 4.4 MMOL/L (ref 3.6–5.2)
TSH SERPL-ACNC: 2.7 MIU/L (ref 0.3–4.2)

## 2021-02-16 ENCOUNTER — IMMUNIZATION (OUTPATIENT)
Dept: FAMILY MEDICINE | Facility: CLINIC | Age: 81
End: 2021-02-16
Payer: COMMERCIAL

## 2021-02-16 PROCEDURE — 0001A PR COVID VAC PFIZER DIL RECON 30 MCG/0.3 ML IM: CPT

## 2021-02-16 PROCEDURE — 91300 PR COVID VAC PFIZER DIL RECON 30 MCG/0.3 ML IM: CPT

## 2021-02-25 ENCOUNTER — TRANSFERRED RECORDS (OUTPATIENT)
Dept: HEALTH INFORMATION MANAGEMENT | Facility: CLINIC | Age: 81
End: 2021-02-25

## 2021-02-25 LAB
ALT SERPL-CCNC: 30 U/L (ref 7–55)
ALT SERPL-CCNC: 30 U/L (ref 7–55)
CREAT SERPL-MCNC: 1.1 MG/DL (ref 0.74–1.35)
GFR SERPL CREATININE-BSD FRML MDRD: 64 ML/MIN/BSA
GLUCOSE SERPL-MCNC: 181 MG/DL (ref 70–140)
POTASSIUM SERPL-SCNC: 4.5 MMOL/L (ref 3.6–5.2)
TSH SERPL-ACNC: 3.3 MIU/L (ref 0.3–4.2)

## 2021-03-09 ENCOUNTER — IMMUNIZATION (OUTPATIENT)
Dept: FAMILY MEDICINE | Facility: CLINIC | Age: 81
End: 2021-03-09
Attending: FAMILY MEDICINE
Payer: COMMERCIAL

## 2021-03-09 PROCEDURE — 91300 PR COVID VAC PFIZER DIL RECON 30 MCG/0.3 ML IM: CPT

## 2021-03-09 PROCEDURE — 0002A PR COVID VAC PFIZER DIL RECON 30 MCG/0.3 ML IM: CPT

## 2021-03-25 ENCOUNTER — TELEPHONE (OUTPATIENT)
Dept: INTERNAL MEDICINE | Facility: CLINIC | Age: 81
End: 2021-03-25

## 2021-03-25 ENCOUNTER — TRANSFERRED RECORDS (OUTPATIENT)
Dept: HEALTH INFORMATION MANAGEMENT | Facility: CLINIC | Age: 81
End: 2021-03-25

## 2021-03-25 LAB
ALT SERPL-CCNC: 28 U/L (ref 7–55)
AST SERPL-CCNC: 23 U/L (ref 8–48)
CREAT SERPL-MCNC: 1.19 MG/DL (ref 0.74–1.35)
GFR SERPL CREATININE-BSD FRML MDRD: 58 ML/MIN/BSA
GLUCOSE SERPL-MCNC: 152 MG/DL (ref 70–100)
HBA1C MFR BLD: 8 % (ref 4–5.6)
POTASSIUM SERPL-SCNC: 3.9 MMOL/L (ref 3.6–5.2)
TSH SERPL-ACNC: 3.3 MIU/L (ref 0.3–4.2)

## 2021-03-25 NOTE — TELEPHONE ENCOUNTER
Leslie Victor - from [facility]   [phone]    Patient is having neurological symptoms - wanting to see if you would place MRI brain.     Please call to discuss

## 2021-03-26 ENCOUNTER — TRANSFERRED RECORDS (OUTPATIENT)
Dept: HEALTH INFORMATION MANAGEMENT | Facility: CLINIC | Age: 81
End: 2021-03-26

## 2021-03-29 NOTE — TELEPHONE ENCOUNTER
Please see if they want to just fax an order to radiology then he can do the brain MRI here. I haven't seen him or know why he needs it.

## 2021-03-30 NOTE — TELEPHONE ENCOUNTER
I called pt and informed him of the below msg and made him an appt with Dr. Oneill to see about getting the MRI.  Malathi Marshall MA

## 2021-04-26 ENCOUNTER — OFFICE VISIT (OUTPATIENT)
Dept: INTERNAL MEDICINE | Facility: CLINIC | Age: 81
End: 2021-04-26
Payer: COMMERCIAL

## 2021-04-26 VITALS
RESPIRATION RATE: 16 BRPM | WEIGHT: 176 LBS | HEIGHT: 71 IN | HEART RATE: 78 BPM | BODY MASS INDEX: 24.64 KG/M2 | SYSTOLIC BLOOD PRESSURE: 138 MMHG | TEMPERATURE: 97.1 F | OXYGEN SATURATION: 97 % | DIASTOLIC BLOOD PRESSURE: 84 MMHG

## 2021-04-26 DIAGNOSIS — Z85.820 HISTORY OF MELANOMA: ICD-10-CM

## 2021-04-26 DIAGNOSIS — E11.9 TYPE 2 DIABETES MELLITUS WITHOUT COMPLICATION, WITHOUT LONG-TERM CURRENT USE OF INSULIN (H): ICD-10-CM

## 2021-04-26 DIAGNOSIS — R42 DIZZINESS: Primary | ICD-10-CM

## 2021-04-26 PROCEDURE — 99214 OFFICE O/P EST MOD 30 MIN: CPT | Performed by: INTERNAL MEDICINE

## 2021-04-26 ASSESSMENT — MIFFLIN-ST. JEOR: SCORE: 1540.46

## 2021-04-26 ASSESSMENT — PAIN SCALES - GENERAL: PAINLEVEL: NO PAIN (0)

## 2021-04-26 NOTE — PROGRESS NOTES
Assessment & Plan     Dizziness  Dizziness for no clear reason appears to be more vertigo.  Not syncope or lightheadedness.  Ears have a little bit of wax in it today on the left side which is removed a little bit of erythema in the ear and he will not wear his hearing aid for a day or 2.  Loss of hearing he [facility]  His dizziness is overall improved we discussed physical therapy options are meclizine if he has more symptoms.    Type 2 diabetes mellitus without complication, without long-term current use of insulin (H)  Diabetes is doing well endocrinology at the [facility] increase his Metformin from 500 once a day to 500 twice a day repeat an A1c in 3 months.    History of melanoma  Melanoma between his toes with metastases up into his groin he is on immunotherapy doing well PET scan every 3 months has been negative.  MRI of the brain was negative for metastases or stroke.      Review of prior external note(s) from - oncology at [facility]             No follow-ups on file.    Zaid Oneill MD  [facility]   Merlin is a 80 year old who presents for the following health issues     HPI     Chief Complaint   Patient presents with     Dizziness     discuss episodes of dizziness     Been at [place], PET every 3 months, on immunotherapy.  Shrunk in size.  Saw endocrinology for diabetes getting higher, just increased metformin.  Sugars are better.   a1c was up to 8.0.  Got covid vaccine.      Dizziness was happening a month ago.  Seeing oncology at [facility], brain scan was clear.  Mri 3/26.   He would feel unsteady, not dizzy, not weak, no passing out. Claritin has been helping.     Past Medical History:   Diagnosis Date     Amblyopia of eye, left      Bilateral cataracts 2016     Diabetes (H)      Diabetic eye exam (H) 12/17/14     Esophageal reflux      Hypersomnia with sleep apnea, unspecified      Inguinal hernia without mention of  "obstruction or gangrene, unilateral or unspecified, (not specified as recurrent)     s/p repair     Melanoma (H) 07/10/2015    web space between 2 & 3 toe, bx to periphery of margin.  Amputation of 2&3 toe, margins clear     Mixed hyperlipidemia      Other isolated or specific phobias     claustrophobia     Presbyopia      Prostatitis, unspecified      Pyelonephritis, unspecified      Unspecified essential hypertension      Unspecified hearing loss      Current Outpatient Medications   Medication     ACCU-CHEK SMARTVIEW test strip     aMILoride-hydrochlorothiazide (MODURETIC) 5-50 MG TABS per tablet     amLODIPine (NORVASC) 5 MG tablet     ASPIRIN 81 MG OR TABS     atorvastatin (LIPITOR) 40 MG tablet     blood glucose monitoring (ACCU-CHEK FASTCLIX) lancets     Cholecalciferol (VITAMIN D PO)     metFORMIN (GLUCOPHAGE) 500 MG tablet     metoprolol tartrate (LOPRESSOR) 50 MG tablet     omeprazole (PRILOSEC) 20 MG DR capsule     order for DME     potassium chloride ER (KLOR-CON M) 20 MEQ CR tablet     No current facility-administered medications for this visit.      Social History     Tobacco Use     Smoking status: Former Smoker     Quit date: 1974     Years since quittin.3     Smokeless tobacco: Never Used     Tobacco comment: Quit     Substance Use Topics     Alcohol use: Yes     Alcohol/week: 4.0 standard drinks     Types: 4 Standard drinks or equivalent per week     Frequency: 2-4 times a month     Drug use: No       Review of Systems         Objective    /84   Pulse 78   Temp 97.1  F (36.2  C) (Temporal)   Resp 16   Ht 1.803 m (5' 11\")   Wt 79.8 kg (176 lb)   SpO2 97%   BMI 24.55 kg/m    Body mass index is 24.55 kg/m .  Physical Exam   NAD  Heart is regular  Lungs are clear  Right ear is clear, left ear little wax flushed out, mild erythema on the TM.   Neck supple.                 "

## 2021-05-21 ENCOUNTER — TRANSFERRED RECORDS (OUTPATIENT)
Dept: HEALTH INFORMATION MANAGEMENT | Facility: CLINIC | Age: 81
End: 2021-05-21

## 2021-06-02 ENCOUNTER — TELEPHONE (OUTPATIENT)
Dept: INTERNAL MEDICINE | Facility: CLINIC | Age: 81
End: 2021-06-02

## 2021-06-02 ENCOUNTER — OFFICE VISIT (OUTPATIENT)
Dept: INTERNAL MEDICINE | Facility: CLINIC | Age: 81
End: 2021-06-02
Payer: COMMERCIAL

## 2021-06-02 VITALS
BODY MASS INDEX: 24.13 KG/M2 | DIASTOLIC BLOOD PRESSURE: 74 MMHG | RESPIRATION RATE: 16 BRPM | TEMPERATURE: 97.4 F | WEIGHT: 173 LBS | OXYGEN SATURATION: 95 % | SYSTOLIC BLOOD PRESSURE: 132 MMHG | HEART RATE: 58 BPM

## 2021-06-02 DIAGNOSIS — C43.9 METASTATIC MELANOMA (H): ICD-10-CM

## 2021-06-02 DIAGNOSIS — R19.04 LEFT LOWER QUADRANT ABDOMINAL MASS: Primary | ICD-10-CM

## 2021-06-02 PROCEDURE — 99213 OFFICE O/P EST LOW 20 MIN: CPT | Performed by: INTERNAL MEDICINE

## 2021-06-02 ASSESSMENT — PAIN SCALES - GENERAL: PAINLEVEL: NO PAIN (0)

## 2021-06-02 NOTE — PROGRESS NOTES
Assessment & Plan   Problem List Items Addressed This Visit     Metastatic melanoma (H)      Other Visit Diagnoses     Left lower quadrant abdominal mass    -  Primary    Relevant Orders    US Abdomen Limited         Patient with metastatic melanoma from a lesion on his foot.  Treated with immunotherapy at [facility]. Last PET was negative.  Now has lesion or mass on abdomen. I think this is a small hernia but atypical location. No pain with it. Will try to verify with an ultrasound.                 No follow-ups on file.    Zaid Oneill MD  [facility]   Merlin is a 80 year old who presents for the following health issues     HPI     Chief Complaint   Patient presents with     Mass     noticed mass in left groin area     Lump in the left groin area, squishy feeling.  Suspects it is a hernia. Discussed with oncology team and told to see us.  Just noticed it two days ago.      Last two PET were clear, due again in July.      Diabetes is doing well, added one more metformin.       Review of Systems         Objective    /74   Pulse 58   Temp 97.4  F (36.3  C) (Temporal)   Resp 16   Wt 78.5 kg (173 lb)   SpO2 95%   BMI 24.13 kg/m    Body mass index is 24.13 kg/m .  Physical Exam   NAD  abd soft nontender, left lower quadrant has a small squishy mass, possible hernia, disappears when laying down.

## 2021-06-02 NOTE — TELEPHONE ENCOUNTER
Spouse calling and stating patient has been going to [place] for his cancer treatment. Now patient has developed a new lump to the left side of his groin and per their recommendations that patient see his primary for follow up on new lump. Spouse is calling to see if patient can be worked in this week. Please advise. Ofe Farley LPN

## 2021-06-04 ENCOUNTER — HOSPITAL ENCOUNTER (OUTPATIENT)
Dept: ULTRASOUND IMAGING | Facility: CLINIC | Age: 81
Discharge: HOME | End: 2021-06-04
Attending: INTERNAL MEDICINE | Admitting: INTERNAL MEDICINE
Payer: MEDICARE

## 2021-06-04 DIAGNOSIS — E87.6 HYPOPOTASSEMIA: ICD-10-CM

## 2021-06-04 DIAGNOSIS — R19.04 LEFT LOWER QUADRANT ABDOMINAL MASS: ICD-10-CM

## 2021-06-04 DIAGNOSIS — I10 ESSENTIAL HYPERTENSION WITH GOAL BLOOD PRESSURE LESS THAN 140/90: ICD-10-CM

## 2021-06-04 PROCEDURE — 76705 ECHO EXAM OF ABDOMEN: CPT

## 2021-06-04 RX ORDER — POTASSIUM CHLORIDE 1500 MG/1
TABLET, EXTENDED RELEASE ORAL
Qty: 270 TABLET | Refills: 1 | Status: SHIPPED | OUTPATIENT
Start: 2021-06-04 | End: 2021-12-10

## 2021-06-04 NOTE — TELEPHONE ENCOUNTER
Pending Prescriptions:                       Disp   Refills    potassium chloride ER (KLOR-CON M) 20 MEQ *270 ta*1        Sig: TAKE 1 TABLET THREE TIMES DAILY      Routing refill request to provider for review/approval because:  Drug interaction warning    Makenna Westbrook RN on 6/4/2021 at 3:27 PM

## 2021-06-18 ENCOUNTER — TRANSFERRED RECORDS (OUTPATIENT)
Dept: HEALTH INFORMATION MANAGEMENT | Facility: CLINIC | Age: 81
End: 2021-06-18

## 2021-06-22 ENCOUNTER — TRANSFERRED RECORDS (OUTPATIENT)
Dept: HEALTH INFORMATION MANAGEMENT | Facility: CLINIC | Age: 81
End: 2021-06-22

## 2021-06-23 ENCOUNTER — TELEPHONE (OUTPATIENT)
Dept: INTERNAL MEDICINE | Facility: CLINIC | Age: 81
End: 2021-06-23

## 2021-06-23 DIAGNOSIS — E11.9 TYPE 2 DIABETES MELLITUS WITHOUT COMPLICATION, WITHOUT LONG-TERM CURRENT USE OF INSULIN (H): ICD-10-CM

## 2021-06-23 NOTE — TELEPHONE ENCOUNTER
Patient is out of his metformin.  He said he is taking 2 tablets 2 times daily 500 mg.  This is not what is in his chart.  [facility] needs [facility] to [facility] [place] and then a years work to [employer].

## 2021-06-25 DIAGNOSIS — E78.5 HYPERLIPIDEMIA LDL GOAL <130: ICD-10-CM

## 2021-06-25 DIAGNOSIS — I10 ESSENTIAL HYPERTENSION WITH GOAL BLOOD PRESSURE LESS THAN 140/90: ICD-10-CM

## 2021-06-28 RX ORDER — ATORVASTATIN CALCIUM 40 MG/1
TABLET, FILM COATED ORAL
Qty: 90 TABLET | Refills: 1 | Status: SHIPPED | OUTPATIENT
Start: 2021-06-28 | End: 2021-12-10

## 2021-06-28 RX ORDER — AMLODIPINE BESYLATE 5 MG/1
TABLET ORAL
Qty: 90 TABLET | Refills: 1 | Status: SHIPPED | OUTPATIENT
Start: 2021-06-28 | End: 2021-12-10

## 2021-06-28 RX ORDER — AMILORIDE HYDROCHLORIDE AND HYDROCHLOROTHIAZIDE 5; 50 MG/1; MG/1
TABLET ORAL
Qty: 90 TABLET | Refills: 1 | Status: SHIPPED | OUTPATIENT
Start: 2021-06-28 | End: 2021-12-10

## 2021-06-28 NOTE — TELEPHONE ENCOUNTER
"Requested Prescriptions   Pending Prescriptions Disp Refills     aMILoride-hydrochlorothiazide (MODURETIC) 5-50 MG TABS per tablet [Pharmacy Med Name: AMILORIDE/HYDROCHLOROTHIAZIDE 5-50 MG Tablet] 90 tablet 3     Sig: TAKE 1 TABLET EVERY DAY   6/2/2021    Diuretics (Including Combos) Protocol Passed - 6/25/2021  9:11 AM        Passed - Blood pressure under 140/90 in past 12 months     BP Readings from Last 3 Encounters:   06/02/21 132/74   04/26/21 138/84   07/31/20 (!) 147/82           Passed - Recent (12 mo) or future (30 days) visit within the authorizing provider's specialty     Patient has had an office visit with the authorizing provider or a provider within the authorizing providers department within the previous 12 mos or has a future within next 30 days. See \"Patient Info\" tab in inbasket, or \"Choose Columns\" in Meds & Orders section of the refill encounter.              Passed - Medication is active on med list        Passed - Patient is age 18 or older        Passed - Normal serum creatinine on file in past 12 months     Recent Labs   Lab Test 03/25/21   CR 1.19              Passed - Normal serum potassium on file in past 12 months     Recent Labs   Lab Test 03/25/21   POTASSIUM 3.9              Passed - Normal serum sodium on file in past 12 months     Recent Labs   Lab Test 12/17/20  0953               Routing refill request to provider for review/approval        atorvastatin (LIPITOR) 40 MG tablet [Pharmacy Med Name: ATORVASTATIN CALCIUM 40 MG Tablet] 90 tablet 3     Sig: TAKE 1 TABLET EVERY DAY       Statins Protocol Passed - 6/25/2021  9:11 AM        Passed - LDL on file in past 12 months     Recent Labs   Lab Test 12/17/20  0953   LDL 42             Passed - No abnormal creatine kinase in past 12 months     Recent Labs   Lab Test 07/28/16  1611   CKT 57                Passed - Recent (12 mo) or future (30 days) visit within the authorizing provider's specialty     Patient has had an office " "visit with the authorizing provider or a provider within the authorizing providers department within the previous 12 mos or has a future within next 30 days. See \"Patient Info\" tab in inbasket, or \"Choose Columns\" in Meds & Orders section of the refill encounter.              Passed - Medication is active on med list        Passed - Patient is age 18 or older      Prescription approved per [facility] Refill Protocol.       amLODIPine (NORVASC) 5 MG tablet [Pharmacy Med Name: AMLODIPINE BESYLATE 5 MG Tablet] 90 tablet 3     Sig: TAKE 1 TABLET EVERY DAY       Calcium Channel Blockers Protocol  Passed - 6/25/2021  9:11 AM        Passed - Blood pressure under 140/90 in past 12 months     BP Readings from Last 3 Encounters:   06/02/21 132/74   04/26/21 138/84   07/31/20 (!) 147/82           Passed - Recent (12 mo) or future (30 days) visit within the authorizing provider's specialty     Patient has had an office visit with the authorizing provider or a provider within the authorizing providers department within the previous 12 mos or has a future within next 30 days. See \"Patient Info\" tab in inbasket, or \"Choose Columns\" in Meds & Orders section of the refill encounter.              Passed - Medication is active on med list        Passed - Patient is age 18 or older        Passed - Normal serum creatinine on file in past 12 months     Recent Labs   Lab Test 03/25/21 07/03/20  1347 07/03/20  1347   CR 1.19   < >  --    CREAT  --   --  1.1    < > = values in this interval not displayed.       Ok to refill medication if creatinine is low           Prescription approved per [facility] Refill Protocol.  Pretty Arcos RN      "

## 2021-06-28 NOTE — TELEPHONE ENCOUNTER
aMILoride-hydrochlorothiazide (MODURETIC) 5-50 MG TABS per tablet [Pharmacy Med Name: AMILORIDE/HYDROCHLOROTHIAZIDE 5-50 MG Tablet] 90 tablet 3    Sig: TAKE 1 TABLET EVERY DAY   Last filled 6/12/2020 for 90 tablets and 3 refills  Routing refill request to provider for review/approval because:  Drug interaction warning - HIGH  T'dup for 3 months per provider request for review    Pretty Arcos RN

## 2021-07-12 DIAGNOSIS — G47.33 OBSTRUCTIVE SLEEP APNEA: Primary | ICD-10-CM

## 2021-07-15 ENCOUNTER — TRANSFERRED RECORDS (OUTPATIENT)
Dept: HEALTH INFORMATION MANAGEMENT | Facility: CLINIC | Age: 81
End: 2021-07-15
Payer: COMMERCIAL

## 2021-07-21 DIAGNOSIS — K21.9 GASTROESOPHAGEAL REFLUX DISEASE WITHOUT ESOPHAGITIS: ICD-10-CM

## 2021-08-16 ENCOUNTER — TRANSFERRED RECORDS (OUTPATIENT)
Dept: HEALTH INFORMATION MANAGEMENT | Facility: CLINIC | Age: 81
End: 2021-08-16

## 2021-08-16 LAB — RETINOPATHY: NORMAL

## 2021-08-28 ENCOUNTER — HEALTH MAINTENANCE LETTER (OUTPATIENT)
Age: 81
End: 2021-08-28

## 2021-09-14 ENCOUNTER — OFFICE VISIT (OUTPATIENT)
Dept: INTERNAL MEDICINE | Facility: CLINIC | Age: 81
End: 2021-09-14
Payer: COMMERCIAL

## 2021-09-14 VITALS
DIASTOLIC BLOOD PRESSURE: 80 MMHG | RESPIRATION RATE: 16 BRPM | BODY MASS INDEX: 24.41 KG/M2 | TEMPERATURE: 97.4 F | SYSTOLIC BLOOD PRESSURE: 130 MMHG | WEIGHT: 175 LBS | OXYGEN SATURATION: 98 % | HEART RATE: 62 BPM

## 2021-09-14 DIAGNOSIS — K21.9 GASTROESOPHAGEAL REFLUX DISEASE WITHOUT ESOPHAGITIS: ICD-10-CM

## 2021-09-14 DIAGNOSIS — I10 ESSENTIAL HYPERTENSION WITH GOAL BLOOD PRESSURE LESS THAN 140/90: ICD-10-CM

## 2021-09-14 DIAGNOSIS — E78.5 HYPERLIPIDEMIA LDL GOAL <130: ICD-10-CM

## 2021-09-14 DIAGNOSIS — E11.9 TYPE 2 DIABETES MELLITUS WITHOUT COMPLICATION, WITHOUT LONG-TERM CURRENT USE OF INSULIN (H): ICD-10-CM

## 2021-09-14 DIAGNOSIS — Z00.00 MEDICARE ANNUAL WELLNESS VISIT, SUBSEQUENT: Primary | ICD-10-CM

## 2021-09-14 DIAGNOSIS — E87.6 HYPOPOTASSEMIA: ICD-10-CM

## 2021-09-14 DIAGNOSIS — Z85.820 HISTORY OF MELANOMA: ICD-10-CM

## 2021-09-14 LAB
ALBUMIN SERPL-MCNC: 4.1 G/DL (ref 3.4–5)
ALP SERPL-CCNC: 61 U/L (ref 40–150)
ALT SERPL W P-5'-P-CCNC: 38 U/L (ref 0–70)
ANION GAP SERPL CALCULATED.3IONS-SCNC: 6 MMOL/L (ref 3–14)
AST SERPL W P-5'-P-CCNC: 20 U/L (ref 0–45)
BILIRUB SERPL-MCNC: 0.5 MG/DL (ref 0.2–1.3)
BUN SERPL-MCNC: 16 MG/DL (ref 7–30)
CALCIUM SERPL-MCNC: 8.9 MG/DL (ref 8.5–10.1)
CHLORIDE BLD-SCNC: 102 MMOL/L (ref 94–109)
CHOLEST SERPL-MCNC: 108 MG/DL
CO2 SERPL-SCNC: 32 MMOL/L (ref 20–32)
CREAT SERPL-MCNC: 1.14 MG/DL (ref 0.66–1.25)
CREAT UR-MCNC: 109 MG/DL
FASTING STATUS PATIENT QL REPORTED: YES
GFR SERPL CREATININE-BSD FRML MDRD: 61 ML/MIN/1.73M2
GLUCOSE BLD-MCNC: 141 MG/DL (ref 70–99)
HBA1C MFR BLD: 7.2 % (ref 0–5.6)
HDLC SERPL-MCNC: 33 MG/DL
LDLC SERPL CALC-MCNC: 58 MG/DL
MICROALBUMIN UR-MCNC: 7 MG/L
MICROALBUMIN/CREAT UR: 6.42 MG/G CR (ref 0–17)
NONHDLC SERPL-MCNC: 75 MG/DL
POTASSIUM BLD-SCNC: 3.9 MMOL/L (ref 3.4–5.3)
PROT SERPL-MCNC: 7.3 G/DL (ref 6.8–8.8)
SODIUM SERPL-SCNC: 140 MMOL/L (ref 133–144)
TRIGL SERPL-MCNC: 84 MG/DL

## 2021-09-14 PROCEDURE — 80061 LIPID PANEL: CPT | Performed by: INTERNAL MEDICINE

## 2021-09-14 PROCEDURE — 80053 COMPREHEN METABOLIC PANEL: CPT | Performed by: INTERNAL MEDICINE

## 2021-09-14 PROCEDURE — 99397 PER PM REEVAL EST PAT 65+ YR: CPT | Performed by: INTERNAL MEDICINE

## 2021-09-14 PROCEDURE — 36415 COLL VENOUS BLD VENIPUNCTURE: CPT | Performed by: INTERNAL MEDICINE

## 2021-09-14 PROCEDURE — 82043 UR ALBUMIN QUANTITATIVE: CPT | Performed by: INTERNAL MEDICINE

## 2021-09-14 PROCEDURE — 83036 HEMOGLOBIN GLYCOSYLATED A1C: CPT | Performed by: INTERNAL MEDICINE

## 2021-09-14 ASSESSMENT — PAIN SCALES - GENERAL: PAINLEVEL: NO PAIN (0)

## 2021-09-14 ASSESSMENT — ACTIVITIES OF DAILY LIVING (ADL): CURRENT_FUNCTION: NO ASSISTANCE NEEDED

## 2021-09-14 NOTE — PROGRESS NOTES
"SUBJECTIVE:   Merlin J Koppendrayer is a 80 year old male who presents for Preventive Visit.    Patient has been advised of split billing requirements and indicates understanding: Yes   Are you in the first 12 months of your Medicare coverage?  No    Healthy Habits:     In general, how would you rate your overall health?  Very good    Frequency of exercise:  None    Duration of exercise:  Less than 15 minutes    Do you usually eat at least 4 servings of fruit and vegetables a day, include whole grains    & fiber and avoid regularly eating high fat or \"junk\" foods?  Yes    Taking medications regularly:  Yes    Barriers to taking medications:  None    Medication side effects:  None    Ability to successfully perform activities of daily living:  No assistance needed    Home Safety:  No safety concerns identified    Hearing impairment symptoms: Wears hearing aids.    In the past 6 months, have you been bothered by leaking of urine?  No    In general, how would you rate your overall mental or emotional health?  Very good      PHQ-2 Total Score: 0    Additional concerns today:  No    Sees oncology at [facility], thought that cancer is in remission for the last 6 months. Will restage now in October with CT at [facility].    Feeling good, energy is coming back.  Just activity and no exercise.     Was on immunotherapy when received Covid shots in Feb and March, he should get the third shot.      Checking sugars each morning 110 -150 range.  a1c was 7.2      Taking his medications, no problems with them, occasional diarrhea.     MICHELLE and has cpap, uses it everyday, does well.       Do you feel safe in your environment? Yes    Have you ever done Advance Care Planning? (For example, a Health Directive, POLST, or a discussion with a medical provider or your loved ones about your wishes): No, advance care planning information given to patient to review.  Advanced care planning was discussed at today's visit.       Fall risk  Fallen 2 or more " times in the past year?: No  Any fall with injury in the past year?: No    Cognitive Screening   1) Repeat 3 items (Leader, Season, Table)    2) Clock draw: NORMAL  3) 3 item recall: Recalls 3 objects  Results: 3 items recalled: COGNITIVE IMPAIRMENT LESS LIKELY    Mini-CogTM Copyright KEEGAN Stokes. Licensed by the author for use in [facility]; reprinted with permission (adánob@[facility]). All rights reserved.      Do you have sleep apnea, excessive snoring or daytime drowsiness?: yes    Reviewed and updated as needed this visit by clinical staff  Tobacco   Meds              Reviewed and updated as needed this visit by Provider                Social History     Tobacco Use     Smoking status: Former Smoker     Quit date: 1974     Years since quittin.7     Smokeless tobacco: Never Used     Tobacco comment: Quit     Substance Use Topics     Alcohol use: Yes     Alcohol/week: 4.0 standard drinks     Types: 4 Standard drinks or equivalent per week     If you drink alcohol do you typically have >3 drinks per day or >7 drinks per week? No    Alcohol Use 5/10/2019   Prescreen: >3 drinks/day or >7 drinks/week? No   Prescreen: >3 drinks/day or >7 drinks/week? -       Current providers sharing in care for this patient include:   Patient Care Team:  Zaid Oneill MD as PCP - General  Zaid Oneill MD as Assigned PCP  Magalie Bell MD as Assigned Surgical Provider  Renata Damico MD as Assigned Cancer Care Provider    The following health maintenance items are reviewed in Epic and correct as of today:  Health Maintenance Due   Topic Date Due     ANNUAL REVIEW OF  ORDERS  Never done     HEPATITIS C SCREENING  Never done     ZOSTER IMMUNIZATION (1 of 2) Never done     DIABETIC FOOT EXAM  10/27/2018     MEDICARE ANNUAL WELLNESS VISIT  2021     FALL RISK ASSESSMENT  2021     INFLUENZA VACCINE (1) 2021     A1C  2021     Lab work is in process  Pneumonia Vaccine:up to  "date    Review of Systems  CONSTITUTIONAL: NEGATIVE for fever, chills, change in weight  INTEGUMENTARY/SKIN: NEGATIVE for worrisome rashes, moles or lesions  EYES: NEGATIVE for vision changes or irritation  ENT/MOUTH: NEGATIVE for ear, mouth and throat problems  RESP: NEGATIVE for significant cough or SOB  CV: NEGATIVE for chest pain, palpitations or peripheral edema  GI: NEGATIVE for nausea, abdominal pain, heartburn, or change in bowel habits   male :decreased urinary stream  MUSCULOSKELETAL: NEGATIVE for significant arthralgias or myalgia  NEURO: NEGATIVE for weakness, dizziness or paresthesias  ENDOCRINE: NEGATIVE for temperature intolerance, skin/hair changes  HEME: NEGATIVE for bleeding problems  PSYCHIATRIC: NEGATIVE for changes in mood or affect    OBJECTIVE:   /80   Pulse 62   Temp 97.4  F (36.3  C) (Temporal)   Resp 16   Wt 79.4 kg (175 lb)   SpO2 98%   BMI 24.41 kg/m   Estimated body mass index is 24.41 kg/m  as calculated from the following:    Height as of 4/26/21: 1.803 m (5' 11\").    Weight as of this encounter: 79.4 kg (175 lb).  Physical Exam  GENERAL: healthy, alert and no distress  EYES: Eyes grossly normal to inspection, PERRL and conjunctivae and sclerae normal  HENT: ear canals and TM's normal, nose and mouth without ulcers or lesions  NECK: no adenopathy, no asymmetry, masses, or scars and thyroid normal to palpation  RESP: lungs clear to auscultation - no rales, rhonchi or wheezes  CV: regular rate and rhythm, normal S1 S2, no S3 or S4, no murmur, click or rub, no peripheral edema and peripheral pulses strong  ABDOMEN: soft, nontender, no hepatosplenomegaly, no masses and bowel sounds normal  MS: no gross musculoskeletal defects noted, no edema  SKIN: no suspicious lesions or rashes  NEURO: Normal strength and tone, mentation intact and speech normal  PSYCH: mentation appears normal, affect normal/bright      ASSESSMENT / PLAN:       ICD-10-CM    1. Medicare annual wellness " visit, subsequent  Z00.00    2. Hyperlipidemia LDL goal <130  E78.5 Lipid panel reflex to direct LDL Fasting   3. Gastroesophageal reflux disease without esophagitis  K21.9    4. Essential hypertension with goal blood pressure less than 140/90  I10 Lipid panel reflex to direct LDL Fasting     Albumin Random Urine Quantitative with Creat Ratio     Comprehensive metabolic panel (BMP + Alb, Alk Phos, ALT, AST, Total. Bili, TP)   5. Type 2 diabetes mellitus without complication, without long-term current use of insulin (H)  E11.9 Lipid panel reflex to direct LDL Fasting     Hemoglobin A1c     Albumin Random Urine Quantitative with Creat Ratio     Comprehensive metabolic panel (BMP + Alb, Alk Phos, ALT, AST, Total. Bili, TP)   6. Hypopotassemia  E87.6    7. History of melanoma  Z85.820      Overall the patient is doing well.  He says his melanoma is in remission he has been clear for 6 months he will have imaging studies done in October at the [facility].    Type 2 diabetes is well controlled on Metformin A1c was 7.3.  Possibly higher with his immunotherapy his sugars are good at home he checks every morning and it runs between 110 and 150.  We will check his A1c today.    Blood pressures well controlled on multiple medications we will refill those as needed and check his microalbumin and electrolytes today.  Colonoscopy is up-to-date  We discussed immunizations because he received the Covid 19 vaccination when he was on immunotherapy he should receive the third dose.  I recommended he go to our pharmacy and receive the Pfizer third dose as soon as possible.  He will then get the flu vaccine in mid-to-late October.  He will consider doing the shingles vaccine later in the year as well after checking with the cost with his insurance.        Patient has been advised of split billing requirements and indicates understanding: Yes  COUNSELING:  Reviewed preventive health counseling, as reflected in patient instructions        "Regular exercise       Healthy diet/nutrition       Immunizations    Recommended third covid and flu later this fall           Estimated body mass index is 24.41 kg/m  as calculated from the following:    Height as of 4/26/21: 1.803 m (5' 11\").    Weight as of this encounter: 79.4 kg (175 lb).        He reports that he quit smoking about 47 years ago. He has never used smokeless tobacco.      Appropriate preventive services were discussed with this patient, including applicable screening as appropriate for cardiovascular disease, diabetes, osteopenia/osteoporosis, and glaucoma.  As appropriate for age/gender, discussed screening for colorectal cancer, prostate cancer, breast cancer, and cervical cancer. Checklist reviewing preventive services available has been given to the patient.    Reviewed patients plan of care and provided an AVS. The Basic Care Plan (routine screening as documented in Health Maintenance) for Merlin meets the Care Plan requirement. This Care Plan has been established and reviewed with the Patient.    Counseling Resources:  ATP IV Guidelines  Pooled Cohorts Equation Calculator  Breast Cancer Risk Calculator  Breast Cancer: Medication to Reduce Risk  FRAX Risk Assessment  ICSI Preventive Guidelines  Dietary Guidelines for Americans, 2010  USDA's MyPlate  ASA Prophylaxis  Lung CA Screening    Zaid Oneill MD  [facility]    Identified Health Risks:  "

## 2021-10-23 ENCOUNTER — HEALTH MAINTENANCE LETTER (OUTPATIENT)
Age: 81
End: 2021-10-23

## 2021-10-28 ENCOUNTER — TRANSFERRED RECORDS (OUTPATIENT)
Dept: HEALTH INFORMATION MANAGEMENT | Facility: CLINIC | Age: 81
End: 2021-10-28
Payer: COMMERCIAL

## 2021-11-01 ENCOUNTER — TELEPHONE (OUTPATIENT)
Dept: INTERNAL MEDICINE | Facility: CLINIC | Age: 81
End: 2021-11-01

## 2021-11-01 DIAGNOSIS — I72.4 FEMORAL ARTERY PSEUDO-ANEURYSM, RIGHT (H): ICD-10-CM

## 2021-11-01 DIAGNOSIS — R93.89 ABNORMAL CT SCAN: Primary | ICD-10-CM

## 2021-11-01 NOTE — TELEPHONE ENCOUNTER
Patient needs an ultrasound of the right femoral artery and lymph node area.  Please check with radiology and set up the order which I can then sign and let the patient know to schedule it.

## 2021-11-01 NOTE — TELEPHONE ENCOUNTER
Spouse calling with update, that patient was seen at [place] last week with the oncologist. Patient had a CT scan which they noted a bulge in the artery next to were his tumor had previously been. She is stating [facility] [place] is sending the records and recommendations that he complete an ultrasound to further evaluate. Spouse wanting the information sent to his primary to watch for the records and recommendations. Ofe Farley LPN

## 2021-11-02 NOTE — TELEPHONE ENCOUNTER
Order placed and pended. Please sign order if appropriate and route back to [facility].

## 2021-11-04 ENCOUNTER — HOSPITAL ENCOUNTER (OUTPATIENT)
Dept: ULTRASOUND IMAGING | Facility: CLINIC | Age: 81
Discharge: HOME | End: 2021-11-04
Attending: INTERNAL MEDICINE | Admitting: INTERNAL MEDICINE
Payer: MEDICARE

## 2021-11-04 DIAGNOSIS — I72.4 FEMORAL ARTERY PSEUDO-ANEURYSM, RIGHT (H): ICD-10-CM

## 2021-11-04 DIAGNOSIS — R93.89 ABNORMAL CT SCAN: ICD-10-CM

## 2021-11-04 PROCEDURE — 93926 LOWER EXTREMITY STUDY: CPT | Mod: RT

## 2021-11-15 ENCOUNTER — IMMUNIZATION (OUTPATIENT)
Dept: FAMILY MEDICINE | Facility: CLINIC | Age: 81
End: 2021-11-15
Payer: COMMERCIAL

## 2021-11-15 PROCEDURE — 90662 IIV NO PRSV INCREASED AG IM: CPT

## 2021-11-15 PROCEDURE — G0008 ADMIN INFLUENZA VIRUS VAC: HCPCS

## 2021-11-17 DIAGNOSIS — E11.9 TYPE 2 DIABETES MELLITUS WITHOUT COMPLICATION (H): ICD-10-CM

## 2021-11-17 DIAGNOSIS — E78.5 HYPERLIPIDEMIA LDL GOAL <130: ICD-10-CM

## 2021-11-18 RX ORDER — BLOOD SUGAR DIAGNOSTIC
STRIP MISCELLANEOUS
Qty: 180 STRIP | Refills: 1 | Status: SHIPPED | OUTPATIENT
Start: 2021-11-18 | End: 2021-12-08

## 2021-12-06 DIAGNOSIS — E78.5 HYPERLIPIDEMIA LDL GOAL <130: ICD-10-CM

## 2021-12-06 DIAGNOSIS — E11.9 TYPE 2 DIABETES MELLITUS WITHOUT COMPLICATION (H): ICD-10-CM

## 2021-12-08 DIAGNOSIS — I10 ESSENTIAL HYPERTENSION WITH GOAL BLOOD PRESSURE LESS THAN 140/90: ICD-10-CM

## 2021-12-08 DIAGNOSIS — E78.5 HYPERLIPIDEMIA LDL GOAL <130: ICD-10-CM

## 2021-12-08 DIAGNOSIS — E87.6 HYPOPOTASSEMIA: ICD-10-CM

## 2021-12-08 RX ORDER — BLOOD SUGAR DIAGNOSTIC
STRIP MISCELLANEOUS
Qty: 200 STRIP | Refills: 1 | Status: SHIPPED | OUTPATIENT
Start: 2021-12-08 | End: 2021-12-14

## 2021-12-10 DIAGNOSIS — E11.9 TYPE 2 DIABETES MELLITUS WITHOUT COMPLICATION (H): ICD-10-CM

## 2021-12-10 DIAGNOSIS — E78.5 HYPERLIPIDEMIA LDL GOAL <130: ICD-10-CM

## 2021-12-10 RX ORDER — POTASSIUM CHLORIDE 1500 MG/1
TABLET, EXTENDED RELEASE ORAL
Qty: 270 TABLET | Refills: 1 | Status: SHIPPED | OUTPATIENT
Start: 2021-12-10 | End: 2022-06-14

## 2021-12-10 RX ORDER — ATORVASTATIN CALCIUM 40 MG/1
TABLET, FILM COATED ORAL
Qty: 90 TABLET | Refills: 1 | Status: SHIPPED | OUTPATIENT
Start: 2021-12-10 | End: 2022-06-14

## 2021-12-10 RX ORDER — AMILORIDE HYDROCHLORIDE AND HYDROCHLOROTHIAZIDE 5; 50 MG/1; MG/1
TABLET ORAL
Qty: 90 TABLET | Refills: 1 | Status: SHIPPED | OUTPATIENT
Start: 2021-12-10 | End: 2022-06-14

## 2021-12-10 RX ORDER — AMLODIPINE BESYLATE 5 MG/1
TABLET ORAL
Qty: 90 TABLET | Refills: 1 | Status: SHIPPED | OUTPATIENT
Start: 2021-12-10 | End: 2022-06-14

## 2021-12-10 NOTE — TELEPHONE ENCOUNTER
Note from pharmacy,    Pt is not  Insulin dependent-Medicare will only pay for testing once per day- Can we get an rx to reflect this?     Kathrin Chung MA 12/10/2021  5:13 PM

## 2021-12-10 NOTE — TELEPHONE ENCOUNTER
Prescription approved per [facility] Refill Protocol.    Halley Luz RN

## 2021-12-10 NOTE — TELEPHONE ENCOUNTER
Potassium  Routing refill request to provider for review/approval because:  Drug interaction warning    Moduretic  Routing refill request to provider for review/approval because:  Drug interaction warning      Halley Luz RN

## 2021-12-13 RX ORDER — BLOOD SUGAR DIAGNOSTIC
STRIP MISCELLANEOUS
Qty: 200 STRIP | Refills: 1 | OUTPATIENT
Start: 2021-12-13

## 2021-12-14 DIAGNOSIS — E78.5 HYPERLIPIDEMIA LDL GOAL <130: ICD-10-CM

## 2021-12-14 DIAGNOSIS — E11.9 TYPE 2 DIABETES MELLITUS WITHOUT COMPLICATION (H): ICD-10-CM

## 2021-12-16 RX ORDER — BLOOD SUGAR DIAGNOSTIC
STRIP MISCELLANEOUS
Qty: 100 STRIP | Refills: 1 | Status: SHIPPED | OUTPATIENT
Start: 2021-12-16 | End: 2022-03-30

## 2022-03-10 DIAGNOSIS — K21.9 GASTROESOPHAGEAL REFLUX DISEASE WITHOUT ESOPHAGITIS: ICD-10-CM

## 2022-03-10 DIAGNOSIS — I10 HYPERTENSION GOAL BP (BLOOD PRESSURE) < 140/90: ICD-10-CM

## 2022-03-10 RX ORDER — METOPROLOL TARTRATE 50 MG
TABLET ORAL
Qty: 270 TABLET | Refills: 1 | Status: SHIPPED | OUTPATIENT
Start: 2022-03-10 | End: 2022-09-27

## 2022-03-10 NOTE — TELEPHONE ENCOUNTER
Prescription approved per [facility] Refill Protocol.    DANYELL RubinN, RN  [facility]

## 2022-03-30 DIAGNOSIS — E11.9 TYPE 2 DIABETES MELLITUS WITHOUT COMPLICATION (H): ICD-10-CM

## 2022-03-30 DIAGNOSIS — E78.5 HYPERLIPIDEMIA LDL GOAL <130: ICD-10-CM

## 2022-03-30 RX ORDER — BLOOD SUGAR DIAGNOSTIC
STRIP MISCELLANEOUS
Qty: 100 STRIP | Refills: 1 | Status: SHIPPED | OUTPATIENT
Start: 2022-03-30 | End: 2022-07-27

## 2022-04-05 ENCOUNTER — OFFICE VISIT (OUTPATIENT)
Dept: INTERNAL MEDICINE | Facility: CLINIC | Age: 82
End: 2022-04-05
Payer: COMMERCIAL

## 2022-04-05 VITALS
DIASTOLIC BLOOD PRESSURE: 82 MMHG | RESPIRATION RATE: 18 BRPM | WEIGHT: 176 LBS | SYSTOLIC BLOOD PRESSURE: 126 MMHG | BODY MASS INDEX: 24.55 KG/M2 | HEART RATE: 60 BPM | TEMPERATURE: 98 F | OXYGEN SATURATION: 96 %

## 2022-04-05 DIAGNOSIS — Z85.820 HISTORY OF MELANOMA: ICD-10-CM

## 2022-04-05 DIAGNOSIS — L98.9 SKIN LESION: Primary | ICD-10-CM

## 2022-04-05 PROCEDURE — 88305 TISSUE EXAM BY PATHOLOGIST: CPT | Performed by: DERMATOLOGY

## 2022-04-05 PROCEDURE — 11102 TANGNTL BX SKIN SINGLE LES: CPT | Performed by: INTERNAL MEDICINE

## 2022-04-05 ASSESSMENT — PATIENT HEALTH QUESTIONNAIRE - PHQ9
10. IF YOU CHECKED OFF ANY PROBLEMS, HOW DIFFICULT HAVE THESE PROBLEMS MADE IT FOR YOU TO DO YOUR WORK, TAKE CARE OF THINGS AT HOME, OR GET ALONG WITH OTHER PEOPLE: NOT DIFFICULT AT ALL
SUM OF ALL RESPONSES TO PHQ QUESTIONS 1-9: 1
SUM OF ALL RESPONSES TO PHQ QUESTIONS 1-9: 1

## 2022-04-05 ASSESSMENT — PAIN SCALES - GENERAL: PAINLEVEL: NO PAIN (0)

## 2022-04-05 NOTE — PROGRESS NOTES
Assessment & Plan   Problem List Items Addressed This Visit     None      Visit Diagnoses     Skin lesion    -  Primary    Relevant Orders    Dermatological path order and indications    History of melanoma        Relevant Orders    Dermatological path order and indications           Patient with history of melanoma in 2015, recurrence in 2020.  Free of disease most recently at the [facility].  Now has a growing skin tag on his back we will do a biopsy to make sure there is not a melanoma.      Procedure note-skin biopsy  Consent was obtained  The area was prepped, anesthetized with 1% lidocaine with epi  Using a shave device I did obtain a shave biopsy specimen and sent to pathology.  Hemostasis achieved with silver nitrate  Aftercare instructions given to the patient.             No follow-ups on file.    Zaid Oneill MD  [facility] Subjective Merlin is a 81 year old who presents for the following health issues     Chief Complaint   Patient presents with     Mole     on back, getting larger        History of Present Illness       Mental Health Follow-up:                    Today's PHQ-9         PHQ-9 Total Score: 1  PHQ-9 Q9 Thoughts of better off dead/self-harm past 2 weeks :   (P) Not at all    How difficult have these problems made it for you to do your work, take care of things at home, or get along with other people: Not difficult at all        Reason for visit:  Spot on Back    He eats 2-3 servings of fruits and vegetables daily.He consumes 0 sweetened beverage(s) daily.He exercises with enough effort to increase his heart rate 30 to 60 minutes per day.  He exercises with enough effort to increase his heart rate 3 or less days per week.   He is taking medications regularly.     Spot on his back been there for a few months, changing.      Next month goes to oncology at [place]. Melanoma has been stable, negative CT scan in February.     Diabetes on metformin, doing well. A1c  was 7 in February. Checks daily tries to keep below 130.         Past Medical History:   Diagnosis Date     Amblyopia of eye, left      Bilateral cataracts 2016     Diabetes (H)      Diabetic eye exam (H) 14     Esophageal reflux      Hypersomnia with sleep apnea, unspecified      Inguinal hernia without mention of obstruction or gangrene, unilateral or unspecified, (not specified as recurrent)     s/p repair     Melanoma (H) 07/10/2015    web space between 2 & 3 toe, bx to periphery of margin.  Amputation of 2&3 toe, margins clear     Mixed hyperlipidemia      Other isolated or specific phobias     claustrophobia     Presbyopia      Prostatitis, unspecified      Pyelonephritis, unspecified      Unspecified essential hypertension      Unspecified hearing loss      Current Outpatient Medications   Medication     aMILoride-hydrochlorothiazide (MODURETIC) 5-50 MG TABS per tablet     amLODIPine (NORVASC) 5 MG tablet     ASPIRIN 81 MG OR TABS     atorvastatin (LIPITOR) 40 MG tablet     blood glucose (ACCU-CHEK SMARTVIEW) test strip     blood glucose monitoring (ACCU-CHEK FASTCLIX) lancets     Cholecalciferol (VITAMIN D PO)     metFORMIN (GLUCOPHAGE) 500 MG tablet     metoprolol tartrate (LOPRESSOR) 50 MG tablet     omeprazole (PRILOSEC) 20 MG DR capsule     order for DME     potassium chloride ER (KLOR-CON M) 20 MEQ CR tablet     No current facility-administered medications for this visit.     Social History     Tobacco Use     Smoking status: Former Smoker     Quit date: 1974     Years since quittin.2     Smokeless tobacco: Never Used     Tobacco comment: Quit     Vaping Use     Vaping Use: Never used   Substance Use Topics     Alcohol use: Yes     Alcohol/week: 4.0 standard drinks     Types: 4 Standard drinks or equivalent per week     Drug use: No         Review of Systems         Objective    /82 (BP Location: Right arm, Patient Position: Sitting, Cuff Size: Adult Regular)   Pulse 60   Temp  98  F (36.7  C) (Temporal)   Resp 18   Wt 79.8 kg (176 lb)   SpO2 96%   BMI 24.55 kg/m    Body mass index is 24.55 kg/m .  Physical Exam   Skin tag on the back

## 2022-04-06 ASSESSMENT — PATIENT HEALTH QUESTIONNAIRE - PHQ9: SUM OF ALL RESPONSES TO PHQ QUESTIONS 1-9: 1

## 2022-04-07 LAB
PATH REPORT.COMMENTS IMP SPEC: NORMAL
PATH REPORT.COMMENTS IMP SPEC: NORMAL
PATH REPORT.FINAL DX SPEC: NORMAL
PATH REPORT.GROSS SPEC: NORMAL
PATH REPORT.MICROSCOPIC SPEC OTHER STN: NORMAL
PATH REPORT.RELEVANT HX SPEC: NORMAL

## 2022-04-09 ENCOUNTER — HEALTH MAINTENANCE LETTER (OUTPATIENT)
Age: 82
End: 2022-04-09

## 2022-06-08 ENCOUNTER — OFFICE VISIT (OUTPATIENT)
Dept: INTERNAL MEDICINE | Facility: CLINIC | Age: 82
End: 2022-06-08
Payer: COMMERCIAL

## 2022-06-08 VITALS
BODY MASS INDEX: 23.76 KG/M2 | SYSTOLIC BLOOD PRESSURE: 136 MMHG | TEMPERATURE: 97.2 F | HEIGHT: 72 IN | RESPIRATION RATE: 14 BRPM | WEIGHT: 175.4 LBS | OXYGEN SATURATION: 96 % | HEART RATE: 63 BPM | DIASTOLIC BLOOD PRESSURE: 68 MMHG

## 2022-06-08 DIAGNOSIS — C77.4 MALIGNANT NEOPLASM METASTATIC TO INGUINAL LYMPH NODE (H): ICD-10-CM

## 2022-06-08 DIAGNOSIS — Z11.59 NEED FOR HEPATITIS C SCREENING TEST: ICD-10-CM

## 2022-06-08 DIAGNOSIS — E11.9 TYPE 2 DIABETES MELLITUS WITHOUT COMPLICATION, WITHOUT LONG-TERM CURRENT USE OF INSULIN (H): Primary | ICD-10-CM

## 2022-06-08 DIAGNOSIS — Z23 HIGH PRIORITY FOR 2019-NCOV VACCINE: ICD-10-CM

## 2022-06-08 DIAGNOSIS — R19.7 DIARRHEA, UNSPECIFIED TYPE: ICD-10-CM

## 2022-06-08 LAB
HBA1C MFR BLD: 7 % (ref 0–5.6)
HCV AB SERPL QL IA: NONREACTIVE

## 2022-06-08 PROCEDURE — 91305 COVID-19,PF,PFIZER (12+ YRS): CPT | Performed by: INTERNAL MEDICINE

## 2022-06-08 PROCEDURE — 36415 COLL VENOUS BLD VENIPUNCTURE: CPT | Performed by: INTERNAL MEDICINE

## 2022-06-08 PROCEDURE — 0054A COVID-19,PF,PFIZER (12+ YRS): CPT | Performed by: INTERNAL MEDICINE

## 2022-06-08 PROCEDURE — 86803 HEPATITIS C AB TEST: CPT | Performed by: INTERNAL MEDICINE

## 2022-06-08 PROCEDURE — 99214 OFFICE O/P EST MOD 30 MIN: CPT | Mod: 25 | Performed by: INTERNAL MEDICINE

## 2022-06-08 PROCEDURE — 83036 HEMOGLOBIN GLYCOSYLATED A1C: CPT | Performed by: INTERNAL MEDICINE

## 2022-06-08 RX ORDER — METFORMIN HCL 500 MG
1000 TABLET, EXTENDED RELEASE 24 HR ORAL 2 TIMES DAILY WITH MEALS
Qty: 360 TABLET | Refills: 3 | Status: SHIPPED | OUTPATIENT
Start: 2022-06-08 | End: 2023-02-10

## 2022-06-08 ASSESSMENT — PAIN SCALES - GENERAL: PAINLEVEL: NO PAIN (0)

## 2022-06-08 NOTE — PROGRESS NOTES
Assessment & Plan     Type 2 diabetes mellitus without complication, without long-term current use of insulin (H)  Type 2 diabetes is actually getting better, he is on metformin has some diarrhea with this at times.  We will change her metformin extended release and see if that is better, check an A1c today.  Sugars dropped down from the 140 range to the 110 and is doing better.  - Hemoglobin A1c; Future  - metFORMIN (GLUCOPHAGE XR) 500 MG 24 hr tablet; Take 2 tablets (1,000 mg) by mouth 2 times daily (with meals)    Diarrhea, unspecified type  Diarrhea for the last 2 weeks.  Possibly related to metformin.  Also anxious about a new lesion they found on the small intestine at the [facility] where he is followed.  He will have a repeat CT scan there.  Possibly when he starts feeling better the diarrhea will calm down.  I think the diarrhea, mild weight loss is helped his diabetes get better.  We will change his metformin to extended release.    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        Return in about 6 months (around 12/8/2022) for Routine Visit.    Zaid Oneill MD  [facility]    Subjective Merlin is a 81 year old who presents for the following health issues     HPI     Diabetes Follow-up    How often are you checking your blood sugar? One time daily  What time of day are you checking your blood sugars (select all that apply)?  Before meals  Have you had any blood sugars above 200?  No  Have you had any blood sugars below 70?  No    What symptoms do you notice when your blood sugar is low?  None    What concerns do you have today about your diabetes?  Diarrhea since adding metformin  dose to twice daily/states the diarrhea has slowed down but his stools are still very loose     Do you have any of these symptoms? (Select all that apply)  No numbness or tingling in feet.  No redness, sores or blisters on feet.  No complaints of excessive thirst.  No reports of blurry vision.  No significant changes to weight.      BP Readings from Last 2 Encounters:   04/05/22 126/82   09/14/21 130/80     Hemoglobin A1C POCT (%)   Date Value   03/25/2021 8.0 (H)   12/17/2020 7.5 (H)     Hemoglobin A1C (%)   Date Value   09/14/2021 7.2 (H)     LDL Cholesterol Calculated (mg/dL)   Date Value   09/14/2021 58   12/17/2020 42   05/10/2019 47             }    Blood sugar dropped from the 140 range to the 110 to 120 range.  No other real changes except some diarrhea and weight loss of a few pounds.      Takes metformin 2 tablets twice a day with food, normally ok on his stomach.     No recent antibiotics, no fevers, no covid symptoms.     Diarrhea has happened more since [place] found a lesion to watch on the bowel, repeat ct in 2-3 months.   Different smell of the stool, mush consistency, seems like he is sick with it.      Labs were good at [place] on May 3rd.     Past Medical History:   Diagnosis Date     Amblyopia of eye, left      Bilateral cataracts 2016     Diabetes (H)      Diabetic eye exam (H) 12/17/14     Esophageal reflux      Hypersomnia with sleep apnea, unspecified      Inguinal hernia without mention of obstruction or gangrene, unilateral or unspecified, (not specified as recurrent)     s/p repair     Melanoma (H) 07/10/2015    web space between 2 & 3 toe, bx to periphery of margin.  Amputation of 2&3 toe, margins clear     Mixed hyperlipidemia      Other isolated or specific phobias     claustrophobia     Presbyopia      Prostatitis, unspecified      Pyelonephritis, unspecified      Unspecified essential hypertension      Unspecified hearing loss      Current Outpatient Medications   Medication      aMILoride-hydrochlorothiazide (MODURETIC) 5-50 MG TABS per tablet     amLODIPine (NORVASC) 5 MG tablet     ASPIRIN 81 MG OR TABS     atorvastatin (LIPITOR) 40 MG tablet     blood glucose (ACCU-CHEK SMARTVIEW) test strip     blood glucose monitoring (ACCU-CHEK FASTCLIX) lancets     Cholecalciferol (VITAMIN D PO)     metFORMIN (GLUCOPHAGE) 500 MG tablet     metoprolol tartrate (LOPRESSOR) 50 MG tablet     omeprazole (PRILOSEC) 20 MG DR capsule     order for DME     potassium chloride ER (KLOR-CON M) 20 MEQ CR tablet     No current facility-administered medications for this visit.     Social History     Tobacco Use     Smoking status: Former Smoker     Quit date: 1974     Years since quittin.4     Smokeless tobacco: Never Used     Tobacco comment: Quit     Vaping Use     Vaping Use: Never used   Substance Use Topics     Alcohol use: Yes     Alcohol/week: 4.0 standard drinks     Types: 4 Standard drinks or equivalent per week     Drug use: No       Review of Systems         Objective    /68 (BP Location: Right arm)   Pulse 63   Temp 97.2  F (36.2  C) (Temporal)   Resp 14   Ht 1.829 m (6')   Wt 79.6 kg (175 lb 6.4 oz)   SpO2 96%   BMI 23.79 kg/m    There is no height or weight on file to calculate BMI.  Physical Exam   No acute distress  Heart is regular lungs are clear  Abdomen is soft benign nontender

## 2022-06-09 DIAGNOSIS — I10 ESSENTIAL HYPERTENSION WITH GOAL BLOOD PRESSURE LESS THAN 140/90: ICD-10-CM

## 2022-06-09 DIAGNOSIS — E87.6 HYPOPOTASSEMIA: ICD-10-CM

## 2022-06-09 DIAGNOSIS — E78.5 HYPERLIPIDEMIA LDL GOAL <130: ICD-10-CM

## 2022-06-14 RX ORDER — AMLODIPINE BESYLATE 5 MG/1
TABLET ORAL
Qty: 90 TABLET | Refills: 1 | Status: SHIPPED | OUTPATIENT
Start: 2022-06-14 | End: 2023-02-10

## 2022-06-14 RX ORDER — ATORVASTATIN CALCIUM 40 MG/1
TABLET, FILM COATED ORAL
Qty: 90 TABLET | Refills: 1 | Status: SHIPPED | OUTPATIENT
Start: 2022-06-14 | End: 2023-02-10

## 2022-06-14 RX ORDER — AMILORIDE HYDROCHLORIDE AND HYDROCHLOROTHIAZIDE 5; 50 MG/1; MG/1
TABLET ORAL
Qty: 90 TABLET | Refills: 1 | Status: SHIPPED | OUTPATIENT
Start: 2022-06-14 | End: 2023-02-10

## 2022-06-14 RX ORDER — POTASSIUM CHLORIDE 1500 MG/1
TABLET, EXTENDED RELEASE ORAL
Qty: 270 TABLET | Refills: 1 | Status: SHIPPED | OUTPATIENT
Start: 2022-06-14 | End: 2023-02-10

## 2022-06-14 NOTE — TELEPHONE ENCOUNTER
Pending Prescriptions:                       Disp   Refills    potassium chloride ER (KLOR-CON M) 20 MEQ *270 ta*1        Sig: TAKE 1 TABLET THREE TIMES DAILY    aMILoride-hydrochlorothiazide (MODURETIC) *90 tab*1        Sig: TAKE 1 TABLET EVERY DAY    Signed Prescriptions:                        Disp   Refills    amLODIPine (NORVASC) 5 MG tablet           90 tab*1        Sig: TAKE 1 TABLET EVERY DAY  Authorizing Provider: JEREMIE CEDILLO  Ordering User: TETE MARTINEZ    atorvastatin (LIPITOR) 40 MG tablet        90 tab*1        Sig: TAKE 1 TABLET EVERY DAY  Authorizing Provider: JEREMIE CEDILLO  Ordering User: TETE MARTINEZ    Routing refill request to provider for review/approval because:  Drug interaction warning    Tete Martinez, BSN, RN

## 2022-06-14 NOTE — TELEPHONE ENCOUNTER
Prescription approved per [facility] Refill Protocol.    Tete Lopez, BSN, RN

## 2022-07-26 DIAGNOSIS — E11.9 TYPE 2 DIABETES MELLITUS WITHOUT COMPLICATION (H): ICD-10-CM

## 2022-07-26 DIAGNOSIS — E78.5 HYPERLIPIDEMIA LDL GOAL <130: ICD-10-CM

## 2022-07-27 RX ORDER — BLOOD SUGAR DIAGNOSTIC
STRIP MISCELLANEOUS
Qty: 100 STRIP | Refills: 1 | Status: SHIPPED | OUTPATIENT
Start: 2022-07-27 | End: 2023-05-05

## 2022-09-19 ENCOUNTER — IMMUNIZATION (OUTPATIENT)
Dept: FAMILY MEDICINE | Facility: CLINIC | Age: 82
End: 2022-09-19
Payer: COMMERCIAL

## 2022-09-19 PROCEDURE — G0008 ADMIN INFLUENZA VIRUS VAC: HCPCS

## 2022-09-19 PROCEDURE — 90662 IIV NO PRSV INCREASED AG IM: CPT

## 2022-09-22 DIAGNOSIS — I10 HYPERTENSION GOAL BP (BLOOD PRESSURE) < 140/90: ICD-10-CM

## 2022-09-22 DIAGNOSIS — K21.9 GASTROESOPHAGEAL REFLUX DISEASE WITHOUT ESOPHAGITIS: ICD-10-CM

## 2022-09-27 RX ORDER — METOPROLOL TARTRATE 50 MG
TABLET ORAL
Qty: 270 TABLET | Refills: 1 | Status: SHIPPED | OUTPATIENT
Start: 2022-09-27 | End: 2023-02-10

## 2022-09-27 NOTE — TELEPHONE ENCOUNTER
Prilosec  Lopressor  Prescription approved per [facility] Refill Protocol.

## 2022-11-26 ENCOUNTER — HEALTH MAINTENANCE LETTER (OUTPATIENT)
Age: 82
End: 2022-11-26

## 2023-01-02 ENCOUNTER — VIRTUAL VISIT (OUTPATIENT)
Dept: INTERNAL MEDICINE | Facility: CLINIC | Age: 83
End: 2023-01-02
Payer: COMMERCIAL

## 2023-01-02 DIAGNOSIS — E11.9 TYPE 2 DIABETES MELLITUS WITHOUT COMPLICATION, WITHOUT LONG-TERM CURRENT USE OF INSULIN (H): ICD-10-CM

## 2023-01-02 DIAGNOSIS — R35.0 URINARY FREQUENCY: ICD-10-CM

## 2023-01-02 DIAGNOSIS — U07.1 INFECTION DUE TO 2019 NOVEL CORONAVIRUS: Primary | ICD-10-CM

## 2023-01-02 LAB
ALBUMIN UR-MCNC: 30 MG/DL
APPEARANCE UR: ABNORMAL
BILIRUB UR QL STRIP: NEGATIVE
COLOR UR AUTO: YELLOW
GLUCOSE UR STRIP-MCNC: 150 MG/DL
HGB UR QL STRIP: ABNORMAL
KETONES UR STRIP-MCNC: NEGATIVE MG/DL
LEUKOCYTE ESTERASE UR QL STRIP: ABNORMAL
MUCOUS THREADS #/AREA URNS LPF: PRESENT /LPF
NITRATE UR QL: NEGATIVE
PH UR STRIP: 6 [PH] (ref 5–7)
RBC URINE: >182 /HPF
SP GR UR STRIP: 1.01 (ref 1–1.03)
SQUAMOUS EPITHELIAL: <1 /HPF
TRANSITIONAL EPI: 1 /HPF
UROBILINOGEN UR STRIP-MCNC: NORMAL MG/DL
WBC URINE: >182 /HPF

## 2023-01-02 PROCEDURE — 87086 URINE CULTURE/COLONY COUNT: CPT | Performed by: INTERNAL MEDICINE

## 2023-01-02 PROCEDURE — 81001 URINALYSIS AUTO W/SCOPE: CPT | Performed by: INTERNAL MEDICINE

## 2023-01-02 PROCEDURE — 99214 OFFICE O/P EST MOD 30 MIN: CPT | Mod: TEL | Performed by: INTERNAL MEDICINE

## 2023-01-02 PROCEDURE — 87186 SC STD MICRODIL/AGAR DIL: CPT | Performed by: INTERNAL MEDICINE

## 2023-01-02 RX ORDER — CEPHALEXIN 500 MG/1
500 CAPSULE ORAL 2 TIMES DAILY
Qty: 20 CAPSULE | Refills: 0 | Status: SHIPPED | OUTPATIENT
Start: 2023-01-02 | End: 2024-05-20

## 2023-01-02 RX ORDER — NIRMATRELVIR AND RITONAVIR 300-100 MG
3 KIT ORAL 2 TIMES DAILY
Qty: 30 EACH | Refills: 0 | Status: SHIPPED | OUTPATIENT
Start: 2023-01-02 | End: 2023-01-23

## 2023-01-02 NOTE — PROGRESS NOTES
Merlin is a 82 year old who is being evaluated via a billable telephone visit.      What phone number would you like to be contacted at? [phone]  How would you like to obtain your AVS? Selene    Distant Location (provider location):  On-site    Assessment & Plan   Problem List Items Addressed This Visit     Diabetes mellitus, type 2 (H)   Other Visit Diagnoses     Infection due to 2019 novel coronavirus    -  Primary    Relevant Medications    nirmatrelvir and ritonavir (PAXLOVID, 300/100,) therapy pack    Urinary frequency        Relevant Orders    UA Macro with Reflex to Micro and Culture - lab collect         82-year-old gentleman who has a history of malignant melanoma which has been treated and is currently cured.  He does have diabetes and is on metformin.  Age.   Patient has urinary symptoms of urinary frequency, odor, pain.  He also has a fever but he also has a cough and is COVID-positive today.  Symptoms started 1 to 2 days ago.  We will check his urine for any sign of a UTI or blood for kidney stones.  I think his back pain is more from COVID.  He has had his vaccinations with his last booster in .  We will treat him with Paxil bid and he will hold his amlodipine and atorvastatin for the 5 days of treatment.  His kidney function is normal he is aware of holding the medication.    The patient was at a  last Thursday and had a family event on Saturday he will inform people of his positive status and they are already testing.       No follow-ups on file.    Zaid Oneill MD  [facility]    Subjective Merlin is a 82 year old, presenting for the following health issues:  Covid Concern      HPI       COVID-19 Symptom Review  How many days ago did these symptoms start? 2022    Are any of the following symptoms significant for you?    New or worsening difficulty breathing? No    Worsening cough? Yes, I am coughing up mucus.    Fever or chills? Yes, the highest  temperature was 101 degrees, cold all the time    Headache: No    Sore throat: YES, slight    Chest pain: No    Diarrhea: No    Body aches? YES, Left sided back pain and incontinent when he gets up he has to use the restroom right away, urine smells very strong.     What treatments has patient tried? Acetaminophen   Does patient live in a nursing home, group home, or shelter? No  Does patient have a way to get food/medications during quarantined? Yes, I have a friend or family member who can help me.          Called in today with urinary symptoms burning, incontinence, pain in the back. Foul odor.  Began last night.  Didn't feel good Sunday, some back stiffness, cold symptoms, balance was off.  Started Saturday.     Coughing some, phlegm, fever up to 101.      Positive at home for Covid. Vaccinated, last booster in June.  Hasn't had Covid before.     No more chemotherapy, melanoma is all treated.     Risk factors of diabetes and age.        Review of Systems         Objective           Vitals:  No vitals were obtained today due to virtual visit.    Physical Exam   healthy, alert and no distress  PSYCH: Alert and oriented times 3; coherent speech, normal   rate and volume, able to articulate logical thoughts, able   to abstract reason, no tangential thoughts, no hallucinations   or delusions  His affect is normal  RESP: No cough, no audible wheezing, able to talk in full sentences  Remainder of exam unable to be completed due to telephone visits                Phone call duration: 13 minutes

## 2023-01-04 LAB — BACTERIA UR CULT: ABNORMAL

## 2023-02-10 ENCOUNTER — OFFICE VISIT (OUTPATIENT)
Dept: INTERNAL MEDICINE | Facility: CLINIC | Age: 83
End: 2023-02-10
Payer: COMMERCIAL

## 2023-02-10 VITALS
DIASTOLIC BLOOD PRESSURE: 84 MMHG | OXYGEN SATURATION: 98 % | WEIGHT: 171 LBS | HEART RATE: 64 BPM | TEMPERATURE: 97.4 F | SYSTOLIC BLOOD PRESSURE: 118 MMHG | HEIGHT: 71 IN | BODY MASS INDEX: 23.94 KG/M2 | RESPIRATION RATE: 16 BRPM

## 2023-02-10 DIAGNOSIS — E87.6 HYPOPOTASSEMIA: ICD-10-CM

## 2023-02-10 DIAGNOSIS — I10 HYPERTENSION GOAL BP (BLOOD PRESSURE) < 140/90: ICD-10-CM

## 2023-02-10 DIAGNOSIS — Z00.00 MEDICARE ANNUAL WELLNESS VISIT, SUBSEQUENT: Primary | ICD-10-CM

## 2023-02-10 DIAGNOSIS — E11.9 TYPE 2 DIABETES MELLITUS WITHOUT COMPLICATION, WITHOUT LONG-TERM CURRENT USE OF INSULIN (H): ICD-10-CM

## 2023-02-10 DIAGNOSIS — I10 ESSENTIAL HYPERTENSION WITH GOAL BLOOD PRESSURE LESS THAN 140/90: ICD-10-CM

## 2023-02-10 DIAGNOSIS — E78.5 HYPERLIPIDEMIA LDL GOAL <130: ICD-10-CM

## 2023-02-10 DIAGNOSIS — R01.1 HEART MURMUR: ICD-10-CM

## 2023-02-10 DIAGNOSIS — K21.9 GASTROESOPHAGEAL REFLUX DISEASE WITHOUT ESOPHAGITIS: ICD-10-CM

## 2023-02-10 DIAGNOSIS — R09.81 NASAL CONGESTION: ICD-10-CM

## 2023-02-10 LAB
ALBUMIN SERPL BCG-MCNC: 4.2 G/DL (ref 3.5–5.2)
ALP SERPL-CCNC: 68 U/L (ref 40–129)
ALT SERPL W P-5'-P-CCNC: 22 U/L (ref 10–50)
ANION GAP SERPL CALCULATED.3IONS-SCNC: 11 MMOL/L (ref 7–15)
AST SERPL W P-5'-P-CCNC: 18 U/L (ref 10–50)
BILIRUB SERPL-MCNC: 0.6 MG/DL
BUN SERPL-MCNC: 17.2 MG/DL (ref 8–23)
CALCIUM SERPL-MCNC: 9.4 MG/DL (ref 8.8–10.2)
CHLORIDE SERPL-SCNC: 97 MMOL/L (ref 98–107)
CHOLEST SERPL-MCNC: 90 MG/DL
CREAT SERPL-MCNC: 0.95 MG/DL (ref 0.67–1.17)
CREAT UR-MCNC: 52.4 MG/DL
DEPRECATED HCO3 PLAS-SCNC: 29 MMOL/L (ref 22–29)
GFR SERPL CREATININE-BSD FRML MDRD: 81 ML/MIN/1.73M2
GLUCOSE SERPL-MCNC: 153 MG/DL (ref 70–99)
HBA1C MFR BLD: 7.4 %
HDLC SERPL-MCNC: 29 MG/DL
LDLC SERPL CALC-MCNC: 46 MG/DL
MICROALBUMIN UR-MCNC: <12 MG/L
MICROALBUMIN/CREAT UR: NORMAL MG/G{CREAT}
NONHDLC SERPL-MCNC: 61 MG/DL
POTASSIUM SERPL-SCNC: 3.7 MMOL/L (ref 3.4–5.3)
PROT SERPL-MCNC: 6.8 G/DL (ref 6.4–8.3)
SODIUM SERPL-SCNC: 137 MMOL/L (ref 136–145)
TRIGL SERPL-MCNC: 75 MG/DL

## 2023-02-10 PROCEDURE — G0439 PPPS, SUBSEQ VISIT: HCPCS | Performed by: INTERNAL MEDICINE

## 2023-02-10 PROCEDURE — 80053 COMPREHEN METABOLIC PANEL: CPT | Performed by: INTERNAL MEDICINE

## 2023-02-10 PROCEDURE — 83036 HEMOGLOBIN GLYCOSYLATED A1C: CPT | Performed by: INTERNAL MEDICINE

## 2023-02-10 PROCEDURE — 82043 UR ALBUMIN QUANTITATIVE: CPT | Performed by: INTERNAL MEDICINE

## 2023-02-10 PROCEDURE — 99214 OFFICE O/P EST MOD 30 MIN: CPT | Mod: 25 | Performed by: INTERNAL MEDICINE

## 2023-02-10 PROCEDURE — 36415 COLL VENOUS BLD VENIPUNCTURE: CPT | Performed by: INTERNAL MEDICINE

## 2023-02-10 PROCEDURE — 80061 LIPID PANEL: CPT | Performed by: INTERNAL MEDICINE

## 2023-02-10 PROCEDURE — 82570 ASSAY OF URINE CREATININE: CPT | Performed by: INTERNAL MEDICINE

## 2023-02-10 RX ORDER — ATORVASTATIN CALCIUM 40 MG/1
40 TABLET, FILM COATED ORAL DAILY
Qty: 90 TABLET | Refills: 3 | Status: SHIPPED | OUTPATIENT
Start: 2023-02-10 | End: 2023-02-14

## 2023-02-10 RX ORDER — METOPROLOL TARTRATE 50 MG
TABLET ORAL
Qty: 270 TABLET | Refills: 3 | Status: SHIPPED | OUTPATIENT
Start: 2023-02-10 | End: 2023-02-14

## 2023-02-10 RX ORDER — AMILORIDE HYDROCHLORIDE AND HYDROCHLOROTHIAZIDE 5; 50 MG/1; MG/1
1 TABLET ORAL DAILY
Qty: 90 TABLET | Refills: 3 | Status: SHIPPED | OUTPATIENT
Start: 2023-02-10 | End: 2023-02-14

## 2023-02-10 RX ORDER — METFORMIN HCL 500 MG
1000 TABLET, EXTENDED RELEASE 24 HR ORAL 2 TIMES DAILY WITH MEALS
Qty: 360 TABLET | Refills: 3 | Status: SHIPPED | OUTPATIENT
Start: 2023-02-10 | End: 2023-02-14

## 2023-02-10 RX ORDER — FLUTICASONE PROPIONATE 50 MCG
1 SPRAY, SUSPENSION (ML) NASAL DAILY
Qty: 16 G | Refills: 0 | Status: SHIPPED | OUTPATIENT
Start: 2023-02-10

## 2023-02-10 RX ORDER — AMLODIPINE BESYLATE 5 MG/1
5 TABLET ORAL DAILY
Qty: 90 TABLET | Refills: 3 | Status: SHIPPED | OUTPATIENT
Start: 2023-02-10 | End: 2023-02-14

## 2023-02-10 RX ORDER — POTASSIUM CHLORIDE 1500 MG/1
TABLET, EXTENDED RELEASE ORAL
Qty: 270 TABLET | Refills: 3 | Status: SHIPPED | OUTPATIENT
Start: 2023-02-10 | End: 2023-02-14

## 2023-02-10 ASSESSMENT — ENCOUNTER SYMPTOMS
CONSTIPATION: 0
HEADACHES: 0
WEAKNESS: 0
DYSURIA: 0
DIARRHEA: 0
PARESTHESIAS: 0
CHILLS: 0
FEVER: 0
ARTHRALGIAS: 1
HEMATURIA: 0
COUGH: 0
NERVOUS/ANXIOUS: 0
NAUSEA: 0
HEMATOCHEZIA: 0
FREQUENCY: 1
DIZZINESS: 1
PALPITATIONS: 0
EYE PAIN: 0
SORE THROAT: 0
JOINT SWELLING: 0
MYALGIAS: 0
HEARTBURN: 0
SHORTNESS OF BREATH: 0
ABDOMINAL PAIN: 0

## 2023-02-10 ASSESSMENT — ACTIVITIES OF DAILY LIVING (ADL): CURRENT_FUNCTION: NO ASSISTANCE NEEDED

## 2023-02-10 NOTE — PROGRESS NOTES
"SUBJECTIVE:   Merlin is a 82 year old who presents for Preventive Visit.  Patient has been advised of split billing requirements and indicates understanding: Yes  Are you in the first 12 months of your Medicare coverage?  No    Healthy Habits:     In general, how would you rate your overall health?  Good    Frequency of exercise:  None    Do you usually eat at least 4 servings of fruit and vegetables a day, include whole grains    & fiber and avoid regularly eating high fat or \"junk\" foods?  Yes    Taking medications regularly:  Yes    Medication side effects:  None    Ability to successfully perform activities of daily living:  No assistance needed    Home Safety:  No safety concerns identified    Hearing Impairment:  No hearing concerns    In the past 6 months, have you been bothered by leaking of urine? Yes    In general, how would you rate your overall mental or emotional health?  Excellent      PHQ-2 Total Score: 0    Additional concerns today:  No    Had Covid at the start of January.  Also had a UTI and was treated.    Dizziness started back then gets better through the day.  [place]urgeon into the wall in the morning when going to the bathroom. Not lightheaded, not spinning but balance is off. Has had it before just wont' go a way.     No appetite since cancer, lost a little weight.      Sugars are up a little at 117 to 140 range.   Needs more exercise, will go back to the gym.              Have you ever done Advance Care Planning? (For example, a Health Directive, POLST, or a discussion with a medical provider or your loved ones about your wishes): No, advance care planning information given to patient to review.  Advanced care planning was discussed at today's visit.    Fall risk  Fallen 2 or more times in the past year?: No  Any fall with injury in the past year?: No    Cognitive Screening   1) Repeat 3 items (Leader, Season, Table)    2) Clock draw: NORMAL  3) 3 item recall: Recalls 1 object   Results: NORMAL " clock, 1-2 items recalled: COGNITIVE IMPAIRMENT LESS LIKELY  Doing ok,   Mini-CogTM Copyright KEEGAN Stokes. Licensed by the author for use in [facility]; reprinted with permission (bonita@[facility]). All rights reserved.      Do you have sleep apnea, excessive snoring or daytime drowsiness?: yes  MICHELLE and has cpap, wears it every night.        Reviewed and updated as needed this visit by clinical staff    Allergies  Meds              Reviewed and updated as needed this visit by Provider                 Social History     Tobacco Use     Smoking status: Former     Types: Cigarettes     Quit date: 1974     Years since quittin.1     Smokeless tobacco: Never     Tobacco comments:     Quit     Substance Use Topics     Alcohol use: Yes     Alcohol/week: 4.0 standard drinks     Types: 4 Standard drinks or equivalent per week     Alcohol Use 2/10/2023   Prescreen: >3 drinks/day or >7 drinks/week? No   Prescreen: >3 drinks/day or >7 drinks/week? -       Current providers sharing in care for this patient include:   Patient Care Team:  Zaid Oneill MD as PCP - General  Zaid Oneill MD as Assigned PCP    The following health maintenance items are reviewed in Epic and correct as of today:  Health Maintenance   Topic Date Due     ZOSTER IMMUNIZATION (1 of 2) Never done     DIABETIC FOOT EXAM  10/27/2018     DTAP/TDAP/TD IMMUNIZATION (4 - Td or Tdap) 2022     COVID-19 Vaccine (5 - Booster for Pfizer series) 2022     EYE EXAM  2022     MEDICARE ANNUAL WELLNESS VISIT  2022     BMP  2022     LIPID  2022     MICROALBUMIN  2022     A1C  2022     ANNUAL REVIEW OF HM ORDERS  2023     FALL RISK ASSESSMENT  02/10/2024     ADVANCE CARE PLANNING  2026     PHQ-2 (once per calendar year)  Completed     INFLUENZA VACCINE  Completed     Pneumococcal Vaccine: 65+ Years  Completed     IPV IMMUNIZATION  Aged Out     MENINGITIS IMMUNIZATION  Aged Out     Lab work is  "in process  Pneumonia Vaccine: up to date.     Review of Systems   Constitutional: Negative for chills and fever.   HENT: Positive for hearing loss. Negative for congestion, ear pain and sore throat.    Eyes: Negative for pain and visual disturbance.   Respiratory: Negative for cough and shortness of breath.    Cardiovascular: Negative for chest pain, palpitations and peripheral edema.   Gastrointestinal: Negative for abdominal pain, constipation, diarrhea, heartburn, hematochezia and nausea.   Genitourinary: Positive for frequency. Negative for dysuria, genital sores, hematuria, impotence, penile discharge and urgency.   Musculoskeletal: Positive for arthralgias. Negative for joint swelling and myalgias.   Skin: Negative for rash.   Neurological: Positive for dizziness. Negative for weakness, headaches and paresthesias.   Psychiatric/Behavioral: Negative for mood changes. The patient is not nervous/anxious.      OBJECTIVE:   /84   Pulse 64   Temp 97.4  F (36.3  C) (Temporal)   Resp 16   Ht 1.803 m (5' 11\")   Wt 77.6 kg (171 lb)   SpO2 98%   BMI 23.85 kg/m   Estimated body mass index is 23.85 kg/m  as calculated from the following:    Height as of this encounter: 1.803 m (5' 11\").    Weight as of this encounter: 77.6 kg (171 lb).  Physical Exam  GENERAL: healthy, alert and no distress  EYES: Eyes grossly normal to inspection, PERRL and conjunctivae and sclerae normal  HENT: ear canals and TM's normal, nose and mouth without ulcers or lesions  NECK: no adenopathy, no asymmetry, masses, or scars and thyroid normal to palpation  RESP: lungs clear to auscultation - no rales, rhonchi or wheezes  CV: regular rates and rhythm, normal S1 S2, no S3 or S4, grade 2/6 systolic murmur heard best over the apex, peripheral pulses strong and no peripheral edema  ABDOMEN: soft, nontender, no hepatosplenomegaly, no masses and bowel sounds normal  MS: no gross musculoskeletal defects noted, no edema  SKIN: no suspicious " lesions or rashes  NEURO: Normal strength and tone, mentation intact and speech normal  PSYCH: mentation appears normal, affect normal/bright        ASSESSMENT / PLAN:       ICD-10-CM    1. Medicare annual wellness visit, subsequent  Z00.00       2. Type 2 diabetes mellitus without complication, without long-term current use of insulin (H)  E11.9 Lipid panel reflex to direct LDL Non-fasting     Albumin Random Urine Quantitative with Creat Ratio     HEMOGLOBIN A1C     Comprehensive metabolic panel (BMP + Alb, Alk Phos, ALT, AST, Total. Bili, TP)     metFORMIN (GLUCOPHAGE XR) 500 MG 24 hr tablet      3. Essential hypertension with goal blood pressure less than 140/90  I10 Comprehensive metabolic panel (BMP + Alb, Alk Phos, ALT, AST, Total. Bili, TP)     aMILoride-hydrochlorothiazide (MODURETIC) 5-50 MG TABS per tablet     amLODIPine (NORVASC) 5 MG tablet     potassium chloride ER (KLOR-CON M) 20 MEQ CR tablet      4. Hyperlipidemia LDL goal <130  E78.5 atorvastatin (LIPITOR) 40 MG tablet      5. Hypertension goal BP (blood pressure) < 140/90  I10 metoprolol tartrate (LOPRESSOR) 50 MG tablet      6. Gastroesophageal reflux disease without esophagitis  K21.9 omeprazole (PRILOSEC) 20 MG DR capsule      7. Hypopotassemia  E87.6 potassium chloride ER (KLOR-CON M) 20 MEQ CR tablet      8. Nasal congestion  R09.81 fluticasone (FLONASE) 50 MCG/ACT nasal spray      9. Heart murmur  R01.1 Echocardiogram Complete        Patient is here for Medicare wellness check.  Overall he is doing quite well.  He needs to get back to exercising.  Memory is okay, no falls.  He did have COVID and got better from that he still has a low appetite after his cancer, weight is down a few pounds but he will work on his appetite.    Other issues today we will refill medications for blood pressure and hyperlipidemia.  Refill his medications for GERD.  He is got some new nasal congestion that comes on in the winter and some mild dizziness we will try  him on Flonase if is not getting better we will then have to evaluate with a scan of his head.    His past metastatic melanoma is followed by [facility], he will see dermatology and his oncologist in the next month down there.    He has a new heart murmur which is very light but we will get an echocardiogram to document this.    Diabetes is doing well he is on metformin, will check his A1c, sugars are anywhere from 1 17-1 40.  He feels his exercise will help bring that down a little better.        Patient has been advised of split billing requirements and indicates understanding: Yes      COUNSELING:  Reviewed preventive health counseling, as reflected in patient instructions       Regular exercise       Healthy diet/nutrition        He reports that he quit smoking about 49 years ago. He has never used smokeless tobacco.      Appropriate preventive services were discussed with this patient, including applicable screening as appropriate for cardiovascular disease, diabetes, osteopenia/osteoporosis, and glaucoma.  As appropriate for age/gender, discussed screening for colorectal cancer, prostate cancer, breast cancer, and cervical cancer. Checklist reviewing preventive services available has been given to the patient.    Reviewed patients plan of care and provided an AVS. The Basic Care Plan (routine screening as documented in Health Maintenance) for Merlin meets the Care Plan requirement. This Care Plan has been established and reviewed with the Patient.      Zaid Oneill MD  [facility]    Identified Health Risks:

## 2023-02-14 DIAGNOSIS — I10 HYPERTENSION GOAL BP (BLOOD PRESSURE) < 140/90: ICD-10-CM

## 2023-02-14 DIAGNOSIS — E11.9 TYPE 2 DIABETES MELLITUS WITHOUT COMPLICATION, WITHOUT LONG-TERM CURRENT USE OF INSULIN (H): ICD-10-CM

## 2023-02-14 DIAGNOSIS — E78.5 HYPERLIPIDEMIA LDL GOAL <130: ICD-10-CM

## 2023-02-14 DIAGNOSIS — I10 ESSENTIAL HYPERTENSION WITH GOAL BLOOD PRESSURE LESS THAN 140/90: ICD-10-CM

## 2023-02-14 DIAGNOSIS — K21.9 GASTROESOPHAGEAL REFLUX DISEASE WITHOUT ESOPHAGITIS: ICD-10-CM

## 2023-02-14 DIAGNOSIS — E87.6 HYPOPOTASSEMIA: ICD-10-CM

## 2023-02-14 RX ORDER — POTASSIUM CHLORIDE 1500 MG/1
TABLET, EXTENDED RELEASE ORAL
Qty: 270 TABLET | Refills: 3 | Status: SHIPPED | OUTPATIENT
Start: 2023-02-14 | End: 2024-03-14

## 2023-02-14 RX ORDER — AMLODIPINE BESYLATE 5 MG/1
5 TABLET ORAL DAILY
Qty: 90 TABLET | Refills: 3 | Status: SHIPPED | OUTPATIENT
Start: 2023-02-14 | End: 2024-03-14

## 2023-02-14 RX ORDER — METOPROLOL TARTRATE 50 MG
TABLET ORAL
Qty: 270 TABLET | Refills: 3 | Status: SHIPPED | OUTPATIENT
Start: 2023-02-14 | End: 2023-10-16

## 2023-02-14 RX ORDER — METFORMIN HCL 500 MG
1000 TABLET, EXTENDED RELEASE 24 HR ORAL 2 TIMES DAILY WITH MEALS
Qty: 360 TABLET | Refills: 3 | Status: SHIPPED | OUTPATIENT
Start: 2023-02-14 | End: 2024-03-14

## 2023-02-14 RX ORDER — ATORVASTATIN CALCIUM 40 MG/1
40 TABLET, FILM COATED ORAL DAILY
Qty: 90 TABLET | Refills: 3 | Status: SHIPPED | OUTPATIENT
Start: 2023-02-14 | End: 2024-03-14

## 2023-02-14 RX ORDER — AMILORIDE HYDROCHLORIDE AND HYDROCHLOROTHIAZIDE 5; 50 MG/1; MG/1
1 TABLET ORAL DAILY
Qty: 90 TABLET | Refills: 3 | Status: SHIPPED | OUTPATIENT
Start: 2023-02-14 | End: 2024-03-14

## 2023-02-14 NOTE — TELEPHONE ENCOUNTER
Patient needs these medications sent to [facility]  Mail Order - please resend with 90 day supply    Isabela DOLLO/

## 2023-03-02 ENCOUNTER — HOSPITAL ENCOUNTER (OUTPATIENT)
Dept: CARDIOLOGY | Facility: CLINIC | Age: 83
Discharge: HOME | End: 2023-03-02
Attending: INTERNAL MEDICINE | Admitting: INTERNAL MEDICINE
Payer: MEDICARE

## 2023-03-02 DIAGNOSIS — R01.1 HEART MURMUR: ICD-10-CM

## 2023-03-02 LAB — LVEF ECHO: NORMAL

## 2023-03-02 PROCEDURE — 93306 TTE W/DOPPLER COMPLETE: CPT

## 2023-03-02 PROCEDURE — 93306 TTE W/DOPPLER COMPLETE: CPT | Mod: 26 | Performed by: INTERNAL MEDICINE

## 2023-05-05 DIAGNOSIS — E78.5 HYPERLIPIDEMIA LDL GOAL <130: ICD-10-CM

## 2023-05-05 DIAGNOSIS — E11.9 TYPE 2 DIABETES MELLITUS WITHOUT COMPLICATION (H): ICD-10-CM

## 2023-05-05 RX ORDER — BLOOD SUGAR DIAGNOSTIC
STRIP MISCELLANEOUS
Qty: 100 STRIP | Refills: 1 | Status: SHIPPED | OUTPATIENT
Start: 2023-05-05 | End: 2023-12-28

## 2023-05-19 ENCOUNTER — TELEPHONE (OUTPATIENT)
Dept: SLEEP MEDICINE | Facility: CLINIC | Age: 83
End: 2023-05-19
Payer: COMMERCIAL

## 2023-05-19 NOTE — TELEPHONE ENCOUNTER
Called patient to explain to him since he has not been seen since 2018 we cannot give him a new RX for sleep supplies until he sees one of the sleep providers.  I told patient I would put him on a cancellation list.

## 2023-05-19 NOTE — TELEPHONE ENCOUNTER
Order/Referral Request    Who is requesting: Patient    Orders being requested: C-pap supplies    Reason service is needed/diagnosis: needs new prescription    When are orders needed by: ASAP    Has this been discussed with Provider: Yes    Does patient have a preference on a Group/Provider/Facility? [facility]    Does patient have an appointment scheduled?: Yes: 10/4/2023    Where to send orders: Place orders within Epic    Could we send this information to you in [facility] or would you prefer to receive a phone call?:   Patient would like to be contacted via [facility]

## 2023-06-02 ENCOUNTER — OFFICE VISIT (OUTPATIENT)
Dept: SLEEP MEDICINE | Facility: CLINIC | Age: 83
End: 2023-06-02
Payer: COMMERCIAL

## 2023-06-02 VITALS
WEIGHT: 175 LBS | HEIGHT: 71 IN | HEART RATE: 57 BPM | OXYGEN SATURATION: 99 % | BODY MASS INDEX: 24.5 KG/M2 | SYSTOLIC BLOOD PRESSURE: 146 MMHG | DIASTOLIC BLOOD PRESSURE: 77 MMHG

## 2023-06-02 DIAGNOSIS — G47.33 OSA (OBSTRUCTIVE SLEEP APNEA): Primary | ICD-10-CM

## 2023-06-02 PROCEDURE — 99203 OFFICE O/P NEW LOW 30 MIN: CPT | Performed by: PHYSICIAN ASSISTANT

## 2023-06-02 ASSESSMENT — SLEEP AND FATIGUE QUESTIONNAIRES
HOW LIKELY ARE YOU TO NOD OFF OR FALL ASLEEP WHILE SITTING INACTIVE IN A PUBLIC PLACE: HIGH CHANCE OF DOZING
HOW LIKELY ARE YOU TO NOD OFF OR FALL ASLEEP WHILE SITTING AND TALKING TO SOMEONE: WOULD NEVER DOZE
HOW LIKELY ARE YOU TO NOD OFF OR FALL ASLEEP WHEN YOU ARE A PASSENGER IN A CAR FOR AN HOUR WITHOUT A BREAK: WOULD NEVER DOZE
HOW LIKELY ARE YOU TO NOD OFF OR FALL ASLEEP WHILE WATCHING TV: HIGH CHANCE OF DOZING
HOW LIKELY ARE YOU TO NOD OFF OR FALL ASLEEP WHILE SITTING AND READING: SLIGHT CHANCE OF DOZING
HOW LIKELY ARE YOU TO NOD OFF OR FALL ASLEEP IN A CAR, WHILE STOPPED FOR A FEW MINUTES IN TRAFFIC: WOULD NEVER DOZE
HOW LIKELY ARE YOU TO NOD OFF OR FALL ASLEEP WHILE LYING DOWN TO REST IN THE AFTERNOON WHEN CIRCUMSTANCES PERMIT: HIGH CHANCE OF DOZING
HOW LIKELY ARE YOU TO NOD OFF OR FALL ASLEEP WHILE SITTING QUIETLY AFTER LUNCH WITHOUT ALCOHOL: MODERATE CHANCE OF DOZING

## 2023-06-02 NOTE — PROGRESS NOTES
Does Merlin have a CPAP/Bipap?  Yes       Type of mask: full face     [facility]: [facility] [phone]    [email or web address]     [place] Sleep Scale: 12  Sleep Apnea - Follow-up Visit:    Impression/Plan:     Moderate Sleep apnea. He is Tolerating PAP well, needs a replacement CPAP and supplies. He continues to get benefit from CPAP. Daytime symptoms are stable.   Replacement device and supplies ordered.    Merlin J Koppendrayer will follow up in about 3 month(s).     Fifteen minutes spent with patient, all of which were spent face-to-face counseling, consulting, coordinating plan of care.      CC:  Zaid Oneill,         History of Present Illness:  Chief Complaint   Patient presents with     CPAP Follow Up       Merlin J Koppendrayer presents for follow-up of their moderate sleep apnea, managed with CPAP.     No specialty comments available.    Do you use a CPAP Machine at home: Yes  Overall, on a scale of 0-10 how would you rate your CPAP (0 poor, 10 great): 7    What type of mask do you use: Full Face Mask  Is your mask comfortable: No  If not, why:      Is your mask leaking: Yes  If yes, where do you feel it: on the sides  How many night per week does the mask leak (0-7): 1 or 2    Do you notice snoring with mask on: No  Do you notice gasping arousals with mask on: No  Are you having significant oral or nasal dryness: No  Is the pressure setting comfortable: Yes  If not, why:      What is your typical bedtime: 9pm  How long does it take you to go to sleep on PAP therapy: 5minutes  What time do you typically get out of bed for the day: 5 am  How many hours on average per night are you using PAP therapy: 4or 5  How many hours are you sleeping per night: 7 to 8  Do you feel well rested in the morning: Yes      [facility]Med   CPAP  cmH2O 30 day usage data:  56% of days with > 4 hours of use. 0/30 days with no use.   Average use 279 minutes per day.   95%ile Leak 28.26 L/min.    AHI 8.6 events per hour.     Auto-PAP 8.0 - 15.0 cmH2O 30 day usage data:    56% of days with > 4 hours of use. 0/30 days with no use.   Average use 279 minutes per day.   95%ile Leak 28.26 L/min.   CPAP 95% pressure 13.6 cm.   AHI 8.6 events per hour.        EPWORTH SLEEPINESS SCALE         6/2/2023     1:37 PM    [place] Sleepiness Scale ( RUSLAN Mckeon  9089-4102<br>ESS - [place]/English - Final version - 21 Nov 07 - [facility] [employer].)   Sitting and reading Slight chance of dozing   Watching TV High chance of dozing   Sitting, inactive in a public place (e.g. a theatre or a meeting) High chance of dozing   As a passenger in a car for an hour without a break Would never doze   Lying down to rest in the afternoon when circumstances permit High chance of dozing   Sitting and talking to someone Would never doze   Sitting quietly after a lunch without alcohol Moderate chance of dozing   In a car, while stopped for a few minutes in traffic Would never doze   [place] Score (MC) 12   [place] Score (Sleep) 12       INSOMNIA SEVERITY INDEX (GARRICK)          6/2/2023     1:32 PM   Insomnia Severity Index (GARRICK)   Difficulty falling asleep 0   Difficulty staying asleep 2   Problems waking up too early 2   How SATISFIED/DISSATISFIED are you with your CURRENT sleep pattern? 1   How NOTICEABLE to others do you think your sleep problem is in terms of impairing the quality of your life? 0   How WORRIED/DISTRESSED are you about your current sleep problem? 1   To what extent do you consider your sleep problem to INTERFERE with your daily functioning (e.g. daytime fatigue, mood, ability to function at work/daily chores, concentration, memory, mood, etc.) CURRENTLY? 0   GARRICK Total Score 6       Guidelines for Scoring/Interpretation:  Total score categories:  0-7 = No clinically significant insomnia   8-14 = Subthreshold insomnia   15-21 = Clinical insomnia (moderate severity)  22-28 = Clinical insomnia (severe)  Used via courtesy of  "[email or web address].gov with permission from Patrick Medeiros PhD., [facility]      Past medical/surgical history, family history, social history, medications and allergies were reviewed.        Problem List:  Patient Active Problem List    Diagnosis Date Noted     Diabetes mellitus, type 2 (H) 01/02/2023     Priority: Medium     Metastatic melanoma (H) 06/02/2021     Priority: Medium     Acute right-sided low back pain with right-sided sciatica 10/27/2017     Priority: Medium     Triceps strain, initial encounter 10/27/2017     Priority: Medium     Essential hypertension with goal blood pressure less than 140/90 07/26/2016     Priority: Medium     Gastroesophageal reflux disease without esophagitis 01/20/2016     Priority: Medium     Anxiety attack 08/12/2015     Priority: Medium     MICHELLE (obstructive sleep apnea) 09/03/2013     Priority: Medium     Advanced directives, counseling/discussion 05/11/2012     Priority: Medium     Discussed advance care planning with patient; however, patient declined at this time. 5/11/2012          HYPERLIPIDEMIA LDL GOAL <130 10/31/2010     Priority: Medium     Hearing loss      Priority: Medium     Problem list name updated by automated process. Provider to review       Inguinal hernia      Priority: Medium     Problem list name updated by automated process. Provider to review       Prostatitis      Priority: Medium     Problem list name updated by automated process. Provider to review       Pyelonephritis      Priority: Medium     Problem list name updated by automated process. Provider to review          BP (!) 155/79   Pulse 57   Ht 1.803 m (5' 11\")   Wt 79.4 kg (175 lb)   SpO2 99%   BMI 24.41 kg/m      "

## 2023-07-10 ENCOUNTER — TELEPHONE (OUTPATIENT)
Dept: INTERNAL MEDICINE | Facility: CLINIC | Age: 83
End: 2023-07-10
Payer: COMMERCIAL

## 2023-07-10 NOTE — TELEPHONE ENCOUNTER
Patient Returning Call    Reason for call:  pt fatigue     Information relayed to patient:  call back     Patient has additional questions:  No      Could we send this information to you in [employer] [facility] or would you prefer to receive a phone call?:   Patient would prefer a phone call   Okay to leave a detailed message?: Yes at Home number on file [phone] (home)

## 2023-08-19 ENCOUNTER — HEALTH MAINTENANCE LETTER (OUTPATIENT)
Age: 83
End: 2023-08-19

## 2023-10-16 DIAGNOSIS — I10 HYPERTENSION GOAL BP (BLOOD PRESSURE) < 140/90: ICD-10-CM

## 2023-10-16 RX ORDER — METOPROLOL TARTRATE 50 MG
TABLET ORAL
Qty: 270 TABLET | Refills: 3 | Status: SHIPPED | OUTPATIENT
Start: 2023-10-16 | End: 2023-12-22

## 2023-11-16 NOTE — TELEPHONE ENCOUNTER
"  Requested Prescriptions   Pending Prescriptions Disp Refills     omeprazole (PRILOSEC) 20 MG DR capsule [Pharmacy Med Name: OMEPRAZOLE 20 MG Capsule Delayed Release] 90 capsule 3     Sig: TAKE 1 CAPSULE EVERY DAY   Last office visit 7/15/2020      PPI Protocol Passed - 10/23/2020 10:14 AM        Passed - Not on Clopidogrel (unless Pantoprazole ordered)        Passed - No diagnosis of osteoporosis on record        Passed - Recent (12 mo) or future (30 days) visit within the authorizing provider's specialty     Patient has had an office visit with the authorizing provider or a provider within the authorizing providers department within the previous 12 mos or has a future within next 30 days. See \"Patient Info\" tab in inbasket, or \"Choose Columns\" in Meds & Orders section of the refill encounter.              Passed - Medication is active on med list        Passed - Patient is age 18 or older         Prescription approved per [facility] Refill Protocol.  Pretty Arcos RN    " Cimetidine Pregnancy And Lactation Text: This medication is Pregnancy Category B and is considered safe during pregnancy. It is also excreted in breast milk and breast feeding isn't recommended. Gabapentin Pregnancy And Lactation Text: This medication is Pregnancy Category C and isn't considered safe during pregnancy. It is excreted in breast milk. Valtrex Pregnancy And Lactation Text: this medication is Pregnancy Category B and is considered safe during pregnancy. This medication is not directly found in breast milk but it's metabolite acyclovir is present. Protopic Counseling: Patient may experience a mild burning sensation during topical application. Protopic is not approved in children less than 2 years of age. There have been case reports of hematologic and skin malignancies in patients using topical calcineurin inhibitors although causality is questionable. Topical Ketoconazole Counseling: Patient counseled that this medication may cause skin irritation or allergic reactions.  In the event of skin irritation, the patient was advised to reduce the amount of the drug applied or use it less frequently.   The patient verbalized understanding of the proper use and possible adverse effects of ketoconazole.  All of the patient's questions and concerns were addressed. Prednisone Counseling:  I discussed with the patient the risks of prolonged use of prednisone including but not limited to weight gain, insomnia, osteoporosis, mood changes, diabetes, susceptibility to infection, glaucoma and high blood pressure.  In cases where prednisone use is prolonged, patients should be monitored with blood pressure checks, serum glucose levels and an eye exam.  Additionally, the patient may need to be placed on GI prophylaxis, PCP prophylaxis, and calcium and vitamin D supplementation and/or a bisphosphonate.  The patient verbalized understanding of the proper use and the possible adverse effects of prednisone.  All of the patient's questions and concerns were addressed. Hydroquinone Pregnancy And Lactation Text: This medication has not been assigned a Pregnancy Risk Category but animal studies failed to show danger with the topical medication. It is unknown if the medication is excreted in breast milk. Carac Pregnancy And Lactation Text: This medication is Pregnancy Category X and contraindicated in pregnancy and in women who may become pregnant. It is unknown if this medication is excreted in breast milk. Tazorac Pregnancy And Lactation Text: This medication is not safe during pregnancy. It is unknown if this medication is excreted in breast milk. Spironolactone Counseling: Patient advised regarding risks of diarrhea, abdominal pain, hyperkalemia, birth defects (for female patients), liver toxicity and renal toxicity. The patient may need blood work to monitor liver and kidney function and potassium levels while on therapy. The patient verbalized understanding of the proper use and possible adverse effects of spironolactone.  All of the patient's questions and concerns were addressed. Topical Ketoconazole Pregnancy And Lactation Text: This medication is Pregnancy Category B and is considered safe during pregnancy. It is unknown if it is excreted in breast milk. Ivermectin Counseling:  Patient instructed to take medication on an empty stomach with a full glass of water.  Patient informed of potential adverse effects including but not limited to nausea, diarrhea, dizziness, itching, and swelling of the extremities or lymph nodes.  The patient verbalized understanding of the proper use and possible adverse effects of ivermectin.  All of the patient's questions and concerns were addressed. Acitretin Counseling:  I discussed with the patient the risks of acitretin including but not limited to hair loss, dry lips/skin/eyes, liver damage, hyperlipidemia, depression/suicidal ideation, photosensitivity.  Serious rare side effects can include but are not limited to pancreatitis, pseudotumor cerebri, bony changes, clot formation/stroke/heart attack.  Patient understands that alcohol is contraindicated since it can result in liver toxicity and significantly prolong the elimination of the drug by many years. Stelara Counseling:  I discussed with the patient the risks of ustekinumab including but not limited to immunosuppression, malignancy, posterior leukoencephalopathy syndrome, and serious infections.  The patient understands that monitoring is required including a PPD at baseline and must alert us or the primary physician if symptoms of infection or other concerning signs are noted. Topical Clindamycin Counseling: Patient counseled that this medication may cause skin irritation or allergic reactions.  In the event of skin irritation, the patient was advised to reduce the amount of the drug applied or use it less frequently.   The patient verbalized understanding of the proper use and possible adverse effects of clindamycin.  All of the patient's questions and concerns were addressed. Topical Sulfur Applications Pregnancy And Lactation Text: This medication is considered safe during pregnancy and breast feeding secondary to limited systemic absorption. Bactrim Pregnancy And Lactation Text: This medication is Pregnancy Category D and is known to cause fetal risk.  It is also excreted in breast milk. Azithromycin Counseling:  I discussed with the patient the risks of azithromycin including but not limited to GI upset, allergic reaction, drug rash, diarrhea, and yeast infections. Stelara Pregnancy And Lactation Text: This medication is Pregnancy Category B and is considered safe during pregnancy. It is unknown if this medication is excreted in breast milk. Itraconazole Pregnancy And Lactation Text: This medication is Pregnancy Category C and it isn't know if it is safe during pregnancy. It is also excreted in breast milk. Calcipotriene Counseling:  I discussed with the patient the risks of calcipotriene including but not limited to erythema, scaling, itching, and irritation. Propranolol Pregnancy And Lactation Text: This medication is Pregnancy Category C and it isn't known if it is safe during pregnancy. It is excreted in breast milk. Azathioprine Counseling:  I discussed with the patient the risks of azathioprine including but not limited to myelosuppression, immunosuppression, hepatotoxicity, lymphoma, and infections.  The patient understands that monitoring is required including baseline LFTs, Creatinine, possible TPMP genotyping and weekly CBCs for the first month and then every 2 weeks thereafter.  The patient verbalized understanding of the proper use and possible adverse effects of azathioprine.  All of the patient's questions and concerns were addressed. Zyclara Pregnancy And Lactation Text: This medication is Pregnancy Category C. It is unknown if this medication is excreted in breast milk. Albendazole Counseling:  I discussed with the patient the risks of albendazole including but not limited to cytopenia, kidney damage, nausea/vomiting and severe allergy.  The patient understands that this medication is being used in an off-label manner. Azelaic Acid Counseling: Patient counseled that medicine may cause skin irritation and to avoid applying near the eyes.  In the event of skin irritation, the patient was advised to reduce the amount of the drug applied or use it less frequently.   The patient verbalized understanding of the proper use and possible adverse effects of azelaic acid.  All of the patient's questions and concerns were addressed. Metronidazole Pregnancy And Lactation Text: This medication is Pregnancy Category B and considered safe during pregnancy.  It is also excreted in breast milk. Mirvaso Pregnancy And Lactation Text: This medication has not been assigned a Pregnancy Risk Category. It is unknown if the medication is excreted in breast milk. Eucrisa Counseling: Patient may experience a mild burning sensation during topical application. Eucrisa is not approved in children less than 2 years of age. Rifampin Counseling: I discussed with the patient the risks of rifampin including but not limited to liver damage, kidney damage, red-orange body fluids, nausea/vomiting and severe allergy. Otezla Counseling: The side effects of Otezla were discussed with the patient, including but not limited to worsening or new depression, weight loss, diarrhea, nausea, upper respiratory tract infection, and headache. Patient instructed to call the office should any adverse effect occur.  The patient verbalized understanding of the proper use and possible adverse effects of Otezla.  All the patient's questions and concerns were addressed. Valtrex Counseling: I discussed with the patient the risks of valacyclovir including but not limited to kidney damage, nausea, vomiting and severe allergy.  The patient understands that if the infection seems to be worsening or is not improving, they are to call. Hydroxyzine Pregnancy And Lactation Text: This medication is not safe during pregnancy and should not be taken. It is also excreted in breast milk and breast feeding isn't recommended. Otezla Pregnancy And Lactation Text: This medication is Pregnancy Category C and it isn't known if it is safe during pregnancy. It is unknown if it is excreted in breast milk. Finasteride Pregnancy And Lactation Text: This medication is absolutely contraindicated during pregnancy. It is unknown if it is excreted in breast milk. SSKI Counseling:  I discussed with the patient the risks of SSKI including but not limited to thyroid abnormalities, metallic taste, GI upset, fever, headache, acne, arthralgias, paraesthesias, lymphadenopathy, easy bleeding, arrhythmias, and allergic reaction. Winlevi Pregnancy And Lactation Text: This medication is considered safe during pregnancy and breastfeeding. Griseofulvin Pregnancy And Lactation Text: This medication is Pregnancy Category X and is known to cause serious birth defects. It is unknown if this medication is excreted in breast milk but breast feeding should be avoided. Tetracycline Counseling: Patient counseled regarding possible photosensitivity and increased risk for sunburn.  Patient instructed to avoid sunlight, if possible.  When exposed to sunlight, patients should wear protective clothing, sunglasses, and sunscreen.  The patient was instructed to call the office immediately if the following severe adverse effects occur:  hearing changes, easy bruising/bleeding, severe headache, or vision changes.  The patient verbalized understanding of the proper use and possible adverse effects of tetracycline.  All of the patient's questions and concerns were addressed. Patient understands to avoid pregnancy while on therapy due to potential birth defects. Rituxan Pregnancy And Lactation Text: This medication is Pregnancy Category C and it isn't know if it is safe during pregnancy. It is unknown if this medication is excreted in breast milk but similar antibodies are known to be excreted. Benzoyl Peroxide Counseling: Patient counseled that medicine may cause skin irritation and bleach clothing.  In the event of skin irritation, the patient was advised to reduce the amount of the drug applied or use it less frequently.   The patient verbalized understanding of the proper use and possible adverse effects of benzoyl peroxide.  All of the patient's questions and concerns were addressed. Doxepin Pregnancy And Lactation Text: This medication is Pregnancy Category C and it isn't known if it is safe during pregnancy. It is also excreted in breast milk and breast feeding isn't recommended. High Dose Vitamin A Counseling: Side effects reviewed, pt to contact office should one occur. Benzoyl Peroxide Pregnancy And Lactation Text: This medication is Pregnancy Category C. It is unknown if benzoyl peroxide is excreted in breast milk. Minocycline Counseling: Patient advised regarding possible photosensitivity and discoloration of the teeth, skin, lips, tongue and gums.  Patient instructed to avoid sunlight, if possible.  When exposed to sunlight, patients should wear protective clothing, sunglasses, and sunscreen.  The patient was instructed to call the office immediately if the following severe adverse effects occur:  hearing changes, easy bruising/bleeding, severe headache, or vision changes.  The patient verbalized understanding of the proper use and possible adverse effects of minocycline.  All of the patient's questions and concerns were addressed. Erythromycin Pregnancy And Lactation Text: This medication is Pregnancy Category B and is considered safe during pregnancy. It is also excreted in breast milk. Colchicine Counseling:  Patient counseled regarding adverse effects including but not limited to stomach upset (nausea, vomiting, stomach pain, or diarrhea).  Patient instructed to limit alcohol consumption while taking this medication.  Colchicine may reduce blood counts especially with prolonged use.  The patient understands that monitoring of kidney function and blood counts may be required, especially at baseline. The patient verbalized understanding of the proper use and possible adverse effects of colchicine.  All of the patient's questions and concerns were addressed. Acitretin Pregnancy And Lactation Text: This medication is Pregnancy Category X and should not be given to women who are pregnant or may become pregnant in the future. This medication is excreted in breast milk. Infliximab Counseling:  I discussed with the patient the risks of infliximab including but not limited to myelosuppression, immunosuppression, autoimmune hepatitis, demyelinating diseases, lymphoma, and serious infections.  The patient understands that monitoring is required including a PPD at baseline and must alert us or the primary physician if symptoms of infection or other concerning signs are noted. Enbrel Counseling:  I discussed with the patient the risks of etanercept including but not limited to myelosuppression, immunosuppression, autoimmune hepatitis, demyelinating diseases, lymphoma, and infections.  The patient understands that monitoring is required including a PPD at baseline and must alert us or the primary physician if symptoms of infection or other concerning signs are noted. Griseofulvin Counseling:  I discussed with the patient the risks of griseofulvin including but not limited to photosensitivity, cytopenia, liver damage, nausea/vomiting and severe allergy.  The patient understands that this medication is best absorbed when taken with a fatty meal (e.g., ice cream or french fries). Prednisone Pregnancy And Lactation Text: This medication is Pregnancy Category C and it isn't know if it is safe during pregnancy. This medication is excreted in breast milk. Birth Control Pills Counseling: Birth Control Pill Counseling: I discussed with the patient the potential side effects of OCPs including but not limited to increased risk of stroke, heart attack, thrombophlebitis, deep venous thrombosis, hepatic adenomas, breast changes, GI upset, headaches, and depression.  The patient verbalized understanding of the proper use and possible adverse effects of OCPs. All of the patient's questions and concerns were addressed. Sski Pregnancy And Lactation Text: This medication is Pregnancy Category D and isn't considered safe during pregnancy. It is excreted in breast milk. Solaraze Pregnancy And Lactation Text: This medication is Pregnancy Category B and is considered safe. There is some data to suggest avoiding during the third trimester. It is unknown if this medication is excreted in breast milk. Drysol Pregnancy And Lactation Text: This medication is considered safe during pregnancy and breast feeding. Methotrexate Counseling:  Patient counseled regarding adverse effects of methotrexate including but not limited to nausea, vomiting, abnormalities in liver function tests. Patients may develop mouth sores, rash, diarrhea, and abnormalities in blood counts. The patient understands that monitoring is required including LFT's and blood counts.  There is a rare possibility of scarring of the liver and lung problems that can occur when taking methotrexate. Persistent nausea, loss of appetite, pale stools, dark urine, cough, and shortness of breath should be reported immediately. Patient advised to discontinue methotrexate treatment at least three months before attempting to become pregnant.  I discussed the need for folate supplements while taking methotrexate.  These supplements can decrease side effects during methotrexate treatment. The patient verbalized understanding of the proper use and possible adverse effects of methotrexate.  All of the patient's questions and concerns were addressed. Calcipotriene Pregnancy And Lactation Text: This medication has not been proven safe during pregnancy. It is unknown if this medication is excreted in breast milk. Skyrizi Counseling: I discussed with the patient the risks of risankizumab-rzaa including but not limited to immunosuppression, and serious infections.  The patient understands that monitoring is required including a PPD at baseline and must alert us or the primary physician if symptoms of infection or other concerning signs are noted. Tranexamic Acid Counseling:  Patient advised of the small risk of bleeding problems with tranexamic acid. They were also instructed to call if they developed any nausea, vomiting or diarrhea. All of the patient's questions and concerns were addressed. Humira Counseling:  I discussed with the patient the risks of adalimumab including but not limited to myelosuppression, immunosuppression, autoimmune hepatitis, demyelinating diseases, lymphoma, and serious infections.  The patient understands that monitoring is required including a PPD at baseline and must alert us or the primary physician if symptoms of infection or other concerning signs are noted. Topical Sulfur Applications Counseling: Topical Sulfur Counseling: Patient counseled that this medication may cause skin irritation or allergic reactions.  In the event of skin irritation, the patient was advised to reduce the amount of the drug applied or use it less frequently.   The patient verbalized understanding of the proper use and possible adverse effects of topical sulfur application.  All of the patient's questions and concerns were addressed. Methotrexate Pregnancy And Lactation Text: This medication is Pregnancy Category X and is known to cause fetal harm. This medication is excreted in breast milk. Doxycycline Pregnancy And Lactation Text: This medication is Pregnancy Category D and not consider safe during pregnancy. It is also excreted in breast milk but is considered safe for shorter treatment courses. Isotretinoin Pregnancy And Lactation Text: This medication is Pregnancy Category X and is considered extremely dangerous during pregnancy. It is unknown if it is excreted in breast milk. Gabapentin Counseling: I discussed with the patient the risks of gabapentin including but not limited to dizziness, somnolence, fatigue and ataxia. Carac Counseling:  I discussed with the patient the risks of Carac including but not limited to erythema, scaling, itching, weeping, crusting, and pain. Xolair Counseling:  Patient informed of potential adverse effects including but not limited to fever, muscle aches, rash and allergic reactions.  The patient verbalized understanding of the proper use and possible adverse effects of Xolair.  All of the patient's questions and concerns were addressed. Ketoconazole Counseling:   Patient counseled regarding improving absorption with orange juice.  Adverse effects include but are not limited to breast enlargement, headache, diarrhea, nausea, upset stomach, liver function test abnormalities, taste disturbance, and stomach pain.  There is a rare possibility of liver failure that can occur when taking ketoconazole. The patient understands that monitoring of LFTs may be required, especially at baseline. The patient verbalized understanding of the proper use and possible adverse effects of ketoconazole.  All of the patient's questions and concerns were addressed. Erivedge Counseling- I discussed with the patient the risks of Erivedge including but not limited to nausea, vomiting, diarrhea, constipation, weight loss, changes in the sense of taste, decreased appetite, muscle spasms, and hair loss.  The patient verbalized understanding of the proper use and possible adverse effects of Erivedge.  All of the patient's questions and concerns were addressed. Nsaids Counseling: NSAID Counseling: I discussed with the patient that NSAIDs should be taken with food. Prolonged use of NSAIDs can result in the development of stomach ulcers.  Patient advised to stop taking NSAIDs if abdominal pain occurs.  The patient verbalized understanding of the proper use and possible adverse effects of NSAIDs.  All of the patient's questions and concerns were addressed. Drysol Counseling:  I discussed with the patient the risks of drysol/aluminum chloride including but not limited to skin rash, itching, irritation, burning. Hydroquinone Counseling:  Patient advised that medication may result in skin irritation, lightening (hypopigmentation), dryness, and burning.  In the event of skin irritation, the patient was advised to reduce the amount of the drug applied or use it less frequently.  Rarely, spots that are treated with hydroquinone can become darker (pseudoochronosis).  Should this occur, patient instructed to stop medication and call the office. The patient verbalized understanding of the proper use and possible adverse effects of hydroquinone.  All of the patient's questions and concerns were addressed. Albendazole Pregnancy And Lactation Text: This medication is Pregnancy Category C and it isn't known if it is safe during pregnancy. It is also excreted in breast milk. Xeldonnaz Pregnancy And Lactation Text: This medication is Pregnancy Category D and is not considered safe during pregnancy.  The risk during breast feeding is also uncertain. Arava Pregnancy And Lactation Text: This medication is Pregnancy Category X and is absolutely contraindicated during pregnancy. It is unknown if it is excreted in breast milk. Ilumya Pregnancy And Lactation Text: The risk during pregnancy and breastfeeding is uncertain with this medication. Winlevi Counseling:  I discussed with the patient the risks of topical clascoterone including but not limited to erythema, scaling, itching, and stinging. Patient voiced their understanding. Thalidomide Counseling: I discussed with the patient the risks of thalidomide including but not limited to birth defects, anxiety, weakness, chest pain, dizziness, cough and severe allergy. Xolair Pregnancy And Lactation Text: This medication is Pregnancy Category B and is considered safe during pregnancy. This medication is excreted in breast milk. Tetracycline Pregnancy And Lactation Text: This medication is Pregnancy Category D and not consider safe during pregnancy. It is also excreted in breast milk. Doxepin Counseling:  Patient advised that the medication is sedating and not to drive a car after taking this medication. Patient informed of potential adverse effects including but not limited to dry mouth, urinary retention, and blurry vision.  The patient verbalized understanding of the proper use and possible adverse effects of doxepin.  All of the patient's questions and concerns were addressed. Siliq Counseling:  I discussed with the patient the risks of Siliq including but not limited to new or worsening depression, suicidal thoughts and behavior, immunosuppression, malignancy, posterior leukoencephalopathy syndrome, and serious infections.  The patient understands that monitoring is required including a PPD at baseline and must alert us or the primary physician if symptoms of infection or other concerning signs are noted. There is also a special program designed to monitor depression which is required with Siliq. Elidel Counseling: Patient may experience a mild burning sensation during topical application. Elidel is not approved in children less than 2 years of age. There have been case reports of hematologic and skin malignancies in patients using topical calcineurin inhibitors although causality is questionable. Dapsone Pregnancy And Lactation Text: This medication is Pregnancy Category C and is not considered safe during pregnancy or breast feeding. Niacinamide Counseling: I recommended taking niacin or niacinamide, also know as vitamin B3, twice daily. Recent evidence suggests that taking vitamin B3 (500 mg twice daily) can reduce the risk of actinic keratoses and non-melanoma skin cancers. Side effects of vitamin B3 include flushing and headache. 5-Fu Counseling: 5-Fluorouracil Counseling:  I discussed with the patient the risks of 5-fluorouracil including but not limited to erythema, scaling, itching, weeping, crusting, and pain. Clindamycin Pregnancy And Lactation Text: This medication can be used in pregnancy if certain situations. Clindamycin is also present in breast milk. Libtayo Pregnancy And Lactation Text: This medication is contraindicated in pregnancy and when breast feeding. Isotretinoin Counseling: Patient should get monthly blood tests, not donate blood, not drive at night if vision affected, not share medication, and not undergo elective surgery for 6 months after tx completed. Side effects reviewed, pt to contact office should one occur. Doxycycline Counseling:  Patient counseled regarding possible photosensitivity and increased risk for sunburn.  Patient instructed to avoid sunlight, if possible.  When exposed to sunlight, patients should wear protective clothing, sunglasses, and sunscreen.  The patient was instructed to call the office immediately if the following severe adverse effects occur:  hearing changes, easy bruising/bleeding, severe headache, or vision changes.  The patient verbalized understanding of the proper use and possible adverse effects of doxycycline.  All of the patient's questions and concerns were addressed. Metronidazole Counseling:  I discussed with the patient the risks of metronidazole including but not limited to seizures, nausea/vomiting, a metallic taste in the mouth, nausea/vomiting and severe allergy. Birth Control Pills Pregnancy And Lactation Text: This medication should be avoided if pregnant and for the first 30 days post-partum. Libtayo Counseling- I discussed with the patient the risks of Libtayo including but not limited to nausea, vomiting, diarrhea, and bone or muscle pain.  The patient verbalized understanding of the proper use and possible adverse effects of Libtayo.  All of the patient's questions and concerns were addressed. Include Pregnancy/Lactation Warning?: No Hydroxychloroquine Pregnancy And Lactation Text: This medication has been shown to cause fetal harm but it isn't assigned a Pregnancy Risk Category. There are small amounts excreted in breast milk. Rifampin Pregnancy And Lactation Text: This medication is Pregnancy Category C and it isn't know if it is safe during pregnancy. It is also excreted in breast milk and should not be used if you are breast feeding. Nsaids Pregnancy And Lactation Text: These medications are considered safe up to 30 weeks gestation. It is excreted in breast milk. Detail Level: Detailed Glycopyrrolate Pregnancy And Lactation Text: This medication is Pregnancy Category B and is considered safe during pregnancy. It is unknown if it is excreted breast milk. Wartpeel Counseling:  I discussed with the patient the risks of Wartpeel including but not limited to erythema, scaling, itching, weeping, crusting, and pain. Oxybutynin Counseling:  I discussed with the patient the risks of oxybutynin including but not limited to skin rash, drowsiness, dry mouth, difficulty urinating, and blurred vision. Cephalexin Pregnancy And Lactation Text: This medication is Pregnancy Category B and considered safe during pregnancy.  It is also excreted in breast milk but can be used safely for shorter doses. Rituxan Counseling:  I discussed with the patient the risks of Rituxan infusions. Side effects can include infusion reactions, severe drug rashes including mucocutaneous reactions, reactivation of latent hepatitis and other infections and rarely progressive multifocal leukoencephalopathy.  All of the patient's questions and concerns were addressed. Clindamycin Counseling: I counseled the patient regarding use of clindamycin as an antibiotic for prophylactic and/or therapeutic purposes. Clindamycin is active against numerous classes of bacteria, including skin bacteria. Side effects may include nausea, diarrhea, gastrointestinal upset, rash, hives, yeast infections, and in rare cases, colitis. Solaraze Counseling:  I discussed with the patient the risks of Solaraze including but not limited to erythema, scaling, itching, weeping, crusting, and pain. Dapsone Counseling: I discussed with the patient the risks of dapsone including but not limited to hemolytic anemia, agranulocytosis, rashes, methemoglobinemia, kidney failure, peripheral neuropathy, headaches, GI upset, and liver toxicity.  Patients who start dapsone require monitoring including baseline LFTs and weekly CBCs for the first month, then every month thereafter.  The patient verbalized understanding of the proper use and possible adverse effects of dapsone.  All of the patient's questions and concerns were addressed. Mirvaso Counseling: Mirvaso is a topical medication which can decrease superficial blood flow where applied. Side effects are uncommon and include stinging, redness and allergic reactions. Oral Minoxidil Counseling- I discussed with the patient the risks of oral minoxidil including but not limited to shortness of breath, swelling of the feet or ankles, dizziness, lightheadedness, unwanted hair growth and allergic reaction.  The patient verbalized understanding of the proper use and possible adverse effects of oral minoxidil.  All of the patient's questions and concerns were addressed. Protopic Pregnancy And Lactation Text: This medication is Pregnancy Category C. It is unknown if this medication is excreted in breast milk when applied topically. Cyclophosphamide Pregnancy And Lactation Text: This medication is Pregnancy Category D and it isn't considered safe during pregnancy. This medication is excreted in breast milk. Tremfya Counseling: I discussed with the patient the risks of guselkumab including but not limited to immunosuppression, serious infections, and drug reactions.  The patient understands that monitoring is required including a PPD at baseline and must alert us or the primary physician if symptoms of infection or other concerning signs are noted. Topical Retinoid counseling:  Patient advised to apply a pea-sized amount only at bedtime and wait 30 minutes after washing their face before applying.  If too drying, patient may add a non-comedogenic moisturizer. The patient verbalized understanding of the proper use and possible adverse effects of retinoids.  All of the patient's questions and concerns were addressed. Erythromycin Counseling:  I discussed with the patient the risks of erythromycin including but not limited to GI upset, allergic reaction, drug rash, diarrhea, increase in liver enzymes, and yeast infections. Cimzia Counseling:  I discussed with the patient the risks of Cimzia including but not limited to immunosuppression, allergic reactions and infections.  The patient understands that monitoring is required including a PPD at baseline and must alert us or the primary physician if symptoms of infection or other concerning signs are noted. Spironolactone Pregnancy And Lactation Text: This medication can cause feminization of the male fetus and should be avoided during pregnancy. The active metabolite is also found in breast milk. Niacinamide Pregnancy And Lactation Text: These medications are considered safe during pregnancy. Tazorac Counseling:  Patient advised that medication is irritating and drying.  Patient may need to apply sparingly and wash off after an hour before eventually leaving it on overnight.  The patient verbalized understanding of the proper use and possible adverse effects of tazorac.  All of the patient's questions and concerns were addressed. Hydroxychloroquine Counseling:  I discussed with the patient that a baseline ophthalmologic exam is needed at the start of therapy and every year thereafter while on therapy. A CBC may also be warranted for monitoring.  The side effects of this medication were discussed with the patient, including but not limited to agranulocytosis, aplastic anemia, seizures, rashes, retinopathy, and liver toxicity. Patient instructed to call the office should any adverse effect occur.  The patient verbalized understanding of the proper use and possible adverse effects of Plaquenil.  All the patient's questions and concerns were addressed. Tranexamic Acid Pregnancy And Lactation Text: It is unknown if this medication is safe during pregnancy or breast feeding. Dutasteride Pregnancy And Lactation Text: This medication is absolutely contraindicated in women, especially during pregnancy and breast feeding. Feminization of male fetuses is possible if taking while pregnant. Picato Counseling:  I discussed with the patient the risks of Picato including but not limited to erythema, scaling, itching, weeping, crusting, and pain. Cellcept Pregnancy And Lactation Text: This medication is Pregnancy Category D and isn't considered safe during pregnancy. It is unknown if this medication is excreted in breast milk. Sarecycline Counseling: Patient advised regarding possible photosensitivity and discoloration of the teeth, skin, lips, tongue and gums.  Patient instructed to avoid sunlight, if possible.  When exposed to sunlight, patients should wear protective clothing, sunglasses, and sunscreen.  The patient was instructed to call the office immediately if the following severe adverse effects occur:  hearing changes, easy bruising/bleeding, severe headache, or vision changes.  The patient verbalized understanding of the proper use and possible adverse effects of sarecycline.  All of the patient's questions and concerns were addressed. Clofazimine Counseling:  I discussed with the patient the risks of clofazimine including but not limited to skin and eye pigmentation, liver damage, nausea/vomiting, gastrointestinal bleeding and allergy. Simponi Counseling:  I discussed with the patient the risks of golimumab including but not limited to myelosuppression, immunosuppression, autoimmune hepatitis, demyelinating diseases, lymphoma, and serious infections.  The patient understands that monitoring is required including a PPD at baseline and must alert us or the primary physician if symptoms of infection or other concerning signs are noted. Rhofade Counseling: Rhofade is a topical medication which can decrease superficial blood flow where applied. Side effects are uncommon and include stinging, redness and allergic reactions. Oral Minoxidil Pregnancy And Lactation Text: This medication should only be used when clearly needed if you are pregnant, attempting to become pregnant or breast feeding. Dupixent Pregnancy And Lactation Text: This medication likely crosses the placenta but the risk for the fetus is uncertain. This medication is excreted in breast milk. Minoxidil Counseling: Minoxidil is a topical medication which can increase blood flow where it is applied. It is uncertain how this medication increases hair growth. Side effects are uncommon and include stinging and allergic reactions. Cimetidine Counseling:  I discussed with the patient the risks of Cimetidine including but not limited to gynecomastia, headache, diarrhea, nausea, drowsiness, arrhythmias, pancreatitis, skin rashes, psychosis, bone marrow suppression and kidney toxicity. Bactrim Counseling:  I discussed with the patient the risks of sulfa antibiotics including but not limited to GI upset, allergic reaction, drug rash, diarrhea, dizziness, photosensitivity, and yeast infections.  Rarely, more serious reactions can occur including but not limited to aplastic anemia, agranulocytosis, methemoglobinemia, blood dyscrasias, liver or kidney failure, lung infiltrates or desquamative/blistering drug rashes. Bexarotene Pregnancy And Lactation Text: This medication is Pregnancy Category X and should not be given to women who are pregnant or may become pregnant. This medication should not be used if you are breast feeding. Glycopyrrolate Counseling:  I discussed with the patient the risks of glycopyrrolate including but not limited to skin rash, drowsiness, dry mouth, difficulty urinating, and blurred vision. Xeljanz Counseling: I discussed with the patient the risks of Xeljanz therapy including increased risk of infection, liver issues, headache, diarrhea, or cold symptoms. Live vaccines should be avoided. They were instructed to call if they have any problems. Opioid Pregnancy And Lactation Text: These medications can lead to premature delivery and should be avoided during pregnancy. These medications are also present in breast milk in small amounts. Cellcept Counseling:  I discussed with the patient the risks of mycophenolate mofetil including but not limited to infection/immunosuppression, GI upset, hypokalemia, hypercholesterolemia, bone marrow suppression, lymphoproliferative disorders, malignancy, GI ulceration/bleed/perforation, colitis, interstitial lung disease, kidney failure, progressive multifocal leukoencephalopathy, and birth defects.  The patient understands that monitoring is required including a baseline creatinine and regular CBC testing. In addition, patient must alert us immediately if symptoms of infection or other concerning signs are noted. Arava Counseling:  Patient counseled regarding adverse effects of Arava including but not limited to nausea, vomiting, abnormalities in liver function tests. Patients may develop mouth sores, rash, diarrhea, and abnormalities in blood counts. The patient understands that monitoring is required including LFTs and blood counts.  There is a rare possibility of scarring of the liver and lung problems that can occur when taking methotrexate. Persistent nausea, loss of appetite, pale stools, dark urine, cough, and shortness of breath should be reported immediately. Patient advised to discontinue Arava treatment and consult with a physician prior to attempting conception. The patient will have to undergo a treatment to eliminate Arava from the body prior to conception. Cyclosporine Counseling:  I discussed with the patient the risks of cyclosporine including but not limited to hypertension, gingival hyperplasia,myelosuppression, immunosuppression, liver damage, kidney damage, neurotoxicity, lymphoma, and serious infections. The patient understands that monitoring is required including baseline blood pressure, CBC, CMP, lipid panel and uric acid, and then 1-2 times monthly CMP and blood pressure. Quinolones Counseling:  I discussed with the patient the risks of fluoroquinolones including but not limited to GI upset, allergic reaction, drug rash, diarrhea, dizziness, photosensitivity, yeast infections, liver function test abnormalities, tendonitis/tendon rupture. Terbinafine Counseling: Patient counseling regarding adverse effects of terbinafine including but not limited to headache, diarrhea, rash, upset stomach, liver function test abnormalities, itching, taste/smell disturbance, nausea, abdominal pain, and flatulence.  There is a rare possibility of liver failure that can occur when taking terbinafine.  The patient understands that a baseline LFT and kidney function test may be required. The patient verbalized understanding of the proper use and possible adverse effects of terbinafine.  All of the patient's questions and concerns were addressed. Cimzia Pregnancy And Lactation Text: This medication crosses the placenta but can be considered safe in certain situations. Cimzia may be excreted in breast milk. Imiquimod Counseling:  I discussed with the patient the risks of imiquimod including but not limited to erythema, scaling, itching, weeping, crusting, and pain.  Patient understands that the inflammatory response to imiquimod is variable from person to person and was educated regarded proper titration schedule.  If flu-like symptoms develop, patient knows to discontinue the medication and contact us. Cosentyx Counseling:  I discussed with the patient the risks of Cosentyx including but not limited to worsening of Crohn's disease, immunosuppression, allergic reactions and infections.  The patient understands that monitoring is required including a PPD at baseline and must alert us or the primary physician if symptoms of infection or other concerning signs are noted. Dupixent Counseling: I discussed with the patient the risks of dupilumab including but not limited to eye infection and irritation, cold sores, injection site reactions, worsening of asthma, allergic reactions and increased risk of parasitic infection.  Live vaccines should be avoided while taking dupilumab. Dupilumab will also interact with certain medications such as warfarin and cyclosporine. The patient understands that monitoring is required and they must alert us or the primary physician if symptoms of infection or other concerning signs are noted. Itraconazole Counseling:  I discussed with the patient the risks of itraconazole including but not limited to liver damage, nausea/vomiting, neuropathy, and severe allergy.  The patient understands that this medication is best absorbed when taken with acidic beverages such as non-diet cola or ginger ale.  The patient understands that monitoring is required including baseline LFTs and repeat LFTs at intervals.  The patient understands that they are to contact us or the primary physician if concerning signs are noted. Zyclara Counseling:  I discussed with the patient the risks of imiquimod including but not limited to erythema, scaling, itching, weeping, crusting, and pain.  Patient understands that the inflammatory response to imiquimod is variable from person to person and was educated regarded proper titration schedule.  If flu-like symptoms develop, patient knows to discontinue the medication and contact us. Ketoconazole Pregnancy And Lactation Text: This medication is Pregnancy Category C and it isn't know if it is safe during pregnancy. It is also excreted in breast milk and breast feeding isn't recommended. Cephalexin Counseling: I counseled the patient regarding use of cephalexin as an antibiotic for prophylactic and/or therapeutic purposes. Cephalexin (commonly prescribed under brand name Keflex) is a cephalosporin antibiotic which is active against numerous classes of bacteria, including most skin bacteria. Side effects may include nausea, diarrhea, gastrointestinal upset, rash, hives, yeast infections, and in rare cases, hepatitis, kidney disease, seizures, fever, confusion, neurologic symptoms, and others. Patients with severe allergies to penicillin medications are cautioned that there is about a 10% incidence of cross-reactivity with cephalosporins. When possible, patients with penicillin allergies should use alternatives to cephalosporins for antibiotic therapy. Opioid Counseling: I discussed with the patient the potential side effects of opioids including but not limited to addiction, altered mental status, and depression. I stressed avoiding alcohol, benzodiazepines, muscle relaxants and sleep aids unless specifically okayed by a physician. The patient verbalized understanding of the proper use and possible adverse effects of opioids. All of the patient's questions and concerns were addressed. They were instructed to flush the remaining pills down the toilet if they did not need them for pain. Cyclophosphamide Counseling:  I discussed with the patient the risks of cyclophosphamide including but not limited to hair loss, hormonal abnormalities, decreased fertility, abdominal pain, diarrhea, nausea and vomiting, bone marrow suppression and infection. The patient understands that monitoring is required while taking this medication. Dutasteride Male Counseling: Dustasteride Counseling:  I discussed with the patient the risks of use of dutasteride including but not limited to decreased libido, decreased ejaculate volume, and gynecomastia. Women who can become pregnant should not handle medication.  All of the patient's questions and concerns were addressed. High Dose Vitamin A Pregnancy And Lactation Text: High dose vitamin A therapy is contraindicated during pregnancy and breast feeding. Taltz Counseling: I discussed with the patient the risks of ixekizumab including but not limited to immunosuppression, serious infections, worsening of inflammatory bowel disease and drug reactions.  The patient understands that monitoring is required including a PPD at baseline and must alert us or the primary physician if symptoms of infection or other concerning signs are noted. Propranolol Counseling:  I discussed with the patient the risks of propranolol including but not limited to low heart rate, low blood pressure, low blood sugar, restlessness and increased cold sensitivity. They should call the office if they experience any of these side effects. Ilumya Counseling: I discussed with the patient the risks of tildrakizumab including but not limited to immunosuppression, malignancy, posterior leukoencephalopathy syndrome, and serious infections.  The patient understands that monitoring is required including a PPD at baseline and must alert us or the primary physician if symptoms of infection or other concerning signs are noted. Azithromycin Pregnancy And Lactation Text: This medication is considered safe during pregnancy and is also secreted in breast milk. Finasteride Male Counseling: Finasteride Counseling:  I discussed with the patient the risks of use of finasteride including but not limited to decreased libido, decreased ejaculate volume, gynecomastia, and depression. Women should not handle medication.  All of the patient's questions and concerns were addressed. Fluconazole Counseling:  Patient counseled regarding adverse effects of fluconazole including but not limited to headache, diarrhea, nausea, upset stomach, liver function test abnormalities, taste disturbance, and stomach pain.  There is a rare possibility of liver failure that can occur when taking fluconazole.  The patient understands that monitoring of LFTs and kidney function test may be required, especially at baseline. The patient verbalized understanding of the proper use and possible adverse effects of fluconazole.  All of the patient's questions and concerns were addressed. Bexarotene Counseling:  I discussed with the patient the risks of bexarotene including but not limited to hair loss, dry lips/skin/eyes, liver abnormalities, hyperlipidemia, pancreatitis, depression/suicidal ideation, photosensitivity, drug rash/allergic reactions, hypothyroidism, anemia, leukopenia, infection, cataracts, and teratogenicity.  Patient understands that they will need regular blood tests to check lipid profile, liver function tests, white blood cell count, thyroid function tests and pregnancy test if applicable. Hydroxyzine Counseling: Patient advised that the medication is sedating and not to drive a car after taking this medication.  Patient informed of potential adverse effects including but not limited to dry mouth, urinary retention, and blurry vision.  The patient verbalized understanding of the proper use and possible adverse effects of hydroxyzine.  All of the patient's questions and concerns were addressed. Odomzo Counseling- I discussed with the patient the risks of Odomzo including but not limited to nausea, vomiting, diarrhea, constipation, weight loss, changes in the sense of taste, decreased appetite, muscle spasms, and hair loss.  The patient verbalized understanding of the proper use and possible adverse effects of Odomzo.  All of the patient's questions and concerns were addressed.

## 2023-12-22 DIAGNOSIS — I10 HYPERTENSION GOAL BP (BLOOD PRESSURE) < 140/90: ICD-10-CM

## 2023-12-22 RX ORDER — METOPROLOL TARTRATE 50 MG
TABLET ORAL
Qty: 270 TABLET | Refills: 2 | Status: SHIPPED | OUTPATIENT
Start: 2023-12-22 | End: 2024-05-20

## 2023-12-28 DIAGNOSIS — E78.5 HYPERLIPIDEMIA LDL GOAL <130: ICD-10-CM

## 2023-12-28 DIAGNOSIS — E11.9 TYPE 2 DIABETES MELLITUS WITHOUT COMPLICATION (H): ICD-10-CM

## 2023-12-28 RX ORDER — BLOOD SUGAR DIAGNOSTIC
STRIP MISCELLANEOUS
Qty: 100 STRIP | Refills: 0 | Status: SHIPPED | OUTPATIENT
Start: 2023-12-28 | End: 2024-02-20

## 2024-01-22 DIAGNOSIS — K21.9 GASTROESOPHAGEAL REFLUX DISEASE WITHOUT ESOPHAGITIS: ICD-10-CM

## 2024-02-20 DIAGNOSIS — E11.9 TYPE 2 DIABETES MELLITUS WITHOUT COMPLICATION (H): ICD-10-CM

## 2024-02-20 DIAGNOSIS — E78.5 HYPERLIPIDEMIA LDL GOAL <130: ICD-10-CM

## 2024-02-20 RX ORDER — BLOOD SUGAR DIAGNOSTIC
STRIP MISCELLANEOUS
Qty: 100 STRIP | Refills: 0 | Status: SHIPPED | OUTPATIENT
Start: 2024-02-20 | End: 2024-04-08

## 2024-03-13 DIAGNOSIS — K21.9 GASTROESOPHAGEAL REFLUX DISEASE WITHOUT ESOPHAGITIS: ICD-10-CM

## 2024-03-13 DIAGNOSIS — E11.9 TYPE 2 DIABETES MELLITUS WITHOUT COMPLICATION, WITHOUT LONG-TERM CURRENT USE OF INSULIN (H): ICD-10-CM

## 2024-03-13 DIAGNOSIS — I10 ESSENTIAL HYPERTENSION WITH GOAL BLOOD PRESSURE LESS THAN 140/90: ICD-10-CM

## 2024-03-13 DIAGNOSIS — E87.6 HYPOPOTASSEMIA: ICD-10-CM

## 2024-03-13 DIAGNOSIS — E78.5 HYPERLIPIDEMIA LDL GOAL <130: ICD-10-CM

## 2024-03-14 RX ORDER — AMILORIDE HYDROCHLORIDE AND HYDROCHLOROTHIAZIDE 5; 50 MG/1; MG/1
1 TABLET ORAL DAILY
Qty: 90 TABLET | Refills: 3 | Status: SHIPPED | OUTPATIENT
Start: 2024-03-14

## 2024-03-14 RX ORDER — AMLODIPINE BESYLATE 5 MG/1
5 TABLET ORAL DAILY
Qty: 90 TABLET | Refills: 3 | Status: SHIPPED | OUTPATIENT
Start: 2024-03-14

## 2024-03-14 RX ORDER — METFORMIN HCL 500 MG
TABLET, EXTENDED RELEASE 24 HR ORAL
Qty: 360 TABLET | Refills: 3 | Status: SHIPPED | OUTPATIENT
Start: 2024-03-14

## 2024-03-14 RX ORDER — ATORVASTATIN CALCIUM 40 MG/1
40 TABLET, FILM COATED ORAL DAILY
Qty: 90 TABLET | Refills: 3 | Status: SHIPPED | OUTPATIENT
Start: 2024-03-14

## 2024-03-14 RX ORDER — POTASSIUM CHLORIDE 1500 MG/1
TABLET, EXTENDED RELEASE ORAL
Qty: 270 TABLET | Refills: 3 | Status: SHIPPED | OUTPATIENT
Start: 2024-03-14

## 2024-03-16 ENCOUNTER — HEALTH MAINTENANCE LETTER (OUTPATIENT)
Age: 84
End: 2024-03-16

## 2024-04-01 ENCOUNTER — MYC MEDICAL ADVICE (OUTPATIENT)
Dept: INTERNAL MEDICINE | Facility: CLINIC | Age: 84
End: 2024-04-01
Payer: COMMERCIAL

## 2024-04-01 DIAGNOSIS — L85.3 DRY SKIN: Primary | ICD-10-CM

## 2024-04-01 NOTE — TELEPHONE ENCOUNTER
Patient states his prescription for ear flaking is    Elocon 0.1 % lotion.      His ears are itching.    Appointment made.  Makenna Westbrook RN on 2024 at 2:33 PM

## 2024-04-02 RX ORDER — MOMETASONE FUROATE 1 MG/G
CREAM TOPICAL DAILY
Qty: 45 G | Refills: 1 | Status: SHIPPED | OUTPATIENT
Start: 2024-04-02

## 2024-04-08 DIAGNOSIS — E11.9 TYPE 2 DIABETES MELLITUS WITHOUT COMPLICATION (H): ICD-10-CM

## 2024-04-08 DIAGNOSIS — E78.5 HYPERLIPIDEMIA LDL GOAL <130: ICD-10-CM

## 2024-04-08 RX ORDER — BLOOD SUGAR DIAGNOSTIC
STRIP MISCELLANEOUS
Qty: 100 STRIP | Refills: 3 | Status: SHIPPED | OUTPATIENT
Start: 2024-04-08

## 2024-05-09 ENCOUNTER — DOCUMENTATION ONLY (OUTPATIENT)
Dept: SLEEP MEDICINE | Facility: CLINIC | Age: 84
End: 2024-05-09
Payer: COMMERCIAL

## 2024-05-09 DIAGNOSIS — G47.33 OBSTRUCTIVE SLEEP APNEA (ADULT) (PEDIATRIC): Primary | ICD-10-CM

## 2024-05-20 ENCOUNTER — OFFICE VISIT (OUTPATIENT)
Dept: INTERNAL MEDICINE | Facility: CLINIC | Age: 84
End: 2024-05-20
Payer: COMMERCIAL

## 2024-05-20 VITALS
WEIGHT: 177 LBS | HEIGHT: 71 IN | RESPIRATION RATE: 16 BRPM | DIASTOLIC BLOOD PRESSURE: 66 MMHG | SYSTOLIC BLOOD PRESSURE: 128 MMHG | BODY MASS INDEX: 24.78 KG/M2 | OXYGEN SATURATION: 97 % | TEMPERATURE: 97.8 F | HEART RATE: 62 BPM

## 2024-05-20 DIAGNOSIS — Z85.820 H/O MALIGNANT MELANOMA: ICD-10-CM

## 2024-05-20 DIAGNOSIS — Z00.00 MEDICARE ANNUAL WELLNESS VISIT, SUBSEQUENT: Primary | ICD-10-CM

## 2024-05-20 DIAGNOSIS — K21.9 GASTROESOPHAGEAL REFLUX DISEASE WITHOUT ESOPHAGITIS: ICD-10-CM

## 2024-05-20 DIAGNOSIS — G47.33 OSA (OBSTRUCTIVE SLEEP APNEA): ICD-10-CM

## 2024-05-20 DIAGNOSIS — R19.09 LUMP IN THE GROIN: ICD-10-CM

## 2024-05-20 DIAGNOSIS — I10 HYPERTENSION GOAL BP (BLOOD PRESSURE) < 140/90: ICD-10-CM

## 2024-05-20 DIAGNOSIS — E78.5 HYPERLIPIDEMIA LDL GOAL <130: ICD-10-CM

## 2024-05-20 DIAGNOSIS — I10 ESSENTIAL HYPERTENSION WITH GOAL BLOOD PRESSURE LESS THAN 140/90: ICD-10-CM

## 2024-05-20 DIAGNOSIS — E11.9 TYPE 2 DIABETES MELLITUS WITHOUT COMPLICATION, WITHOUT LONG-TERM CURRENT USE OF INSULIN (H): ICD-10-CM

## 2024-05-20 LAB
ALBUMIN SERPL BCG-MCNC: 4.4 G/DL (ref 3.5–5.2)
ALP SERPL-CCNC: 67 U/L (ref 40–150)
ALT SERPL W P-5'-P-CCNC: 30 U/L (ref 0–70)
ANION GAP SERPL CALCULATED.3IONS-SCNC: 8 MMOL/L (ref 7–15)
AST SERPL W P-5'-P-CCNC: 24 U/L (ref 0–45)
BILIRUB SERPL-MCNC: 0.5 MG/DL
BUN SERPL-MCNC: 12.6 MG/DL (ref 8–23)
CALCIUM SERPL-MCNC: 9.4 MG/DL (ref 8.8–10.2)
CHLORIDE SERPL-SCNC: 90 MMOL/L (ref 98–107)
CHOLEST SERPL-MCNC: 81 MG/DL
CREAT SERPL-MCNC: 1.16 MG/DL (ref 0.67–1.17)
DEPRECATED HCO3 PLAS-SCNC: 32 MMOL/L (ref 22–29)
EGFRCR SERPLBLD CKD-EPI 2021: 63 ML/MIN/1.73M2
FASTING STATUS PATIENT QL REPORTED: NO
FASTING STATUS PATIENT QL REPORTED: NO
GLUCOSE SERPL-MCNC: 150 MG/DL (ref 70–99)
HBA1C MFR BLD: 7.6 %
HDLC SERPL-MCNC: 31 MG/DL
LDLC SERPL CALC-MCNC: 28 MG/DL
MAGNESIUM SERPL-MCNC: 1.5 MG/DL (ref 1.7–2.3)
NONHDLC SERPL-MCNC: 50 MG/DL
POTASSIUM SERPL-SCNC: 4 MMOL/L (ref 3.4–5.3)
PROT SERPL-MCNC: 7 G/DL (ref 6.4–8.3)
SODIUM SERPL-SCNC: 130 MMOL/L (ref 135–145)
TRIGL SERPL-MCNC: 111 MG/DL

## 2024-05-20 PROCEDURE — 82570 ASSAY OF URINE CREATININE: CPT | Performed by: INTERNAL MEDICINE

## 2024-05-20 PROCEDURE — 36415 COLL VENOUS BLD VENIPUNCTURE: CPT | Performed by: INTERNAL MEDICINE

## 2024-05-20 PROCEDURE — G0439 PPPS, SUBSEQ VISIT: HCPCS | Performed by: INTERNAL MEDICINE

## 2024-05-20 PROCEDURE — 80061 LIPID PANEL: CPT | Performed by: INTERNAL MEDICINE

## 2024-05-20 PROCEDURE — 82043 UR ALBUMIN QUANTITATIVE: CPT | Performed by: INTERNAL MEDICINE

## 2024-05-20 PROCEDURE — 83036 HEMOGLOBIN GLYCOSYLATED A1C: CPT | Performed by: INTERNAL MEDICINE

## 2024-05-20 PROCEDURE — 83735 ASSAY OF MAGNESIUM: CPT | Performed by: INTERNAL MEDICINE

## 2024-05-20 PROCEDURE — 99214 OFFICE O/P EST MOD 30 MIN: CPT | Mod: 25 | Performed by: INTERNAL MEDICINE

## 2024-05-20 PROCEDURE — 80053 COMPREHEN METABOLIC PANEL: CPT | Performed by: INTERNAL MEDICINE

## 2024-05-20 PROCEDURE — 99207 PR FOOT EXAM NO CHARGE: CPT | Performed by: INTERNAL MEDICINE

## 2024-05-20 RX ORDER — METOPROLOL TARTRATE 50 MG
TABLET ORAL
Qty: 270 TABLET | Refills: 3 | Status: SHIPPED | OUTPATIENT
Start: 2024-05-20

## 2024-05-20 SDOH — HEALTH STABILITY: PHYSICAL HEALTH: ON AVERAGE, HOW MANY DAYS PER WEEK DO YOU ENGAGE IN MODERATE TO STRENUOUS EXERCISE (LIKE A BRISK WALK)?: 5 DAYS

## 2024-05-20 ASSESSMENT — SOCIAL DETERMINANTS OF HEALTH (SDOH): HOW OFTEN DO YOU GET TOGETHER WITH FRIENDS OR RELATIVES?: TWICE A WEEK

## 2024-05-20 NOTE — PROGRESS NOTES
Preventive Care Visit  [facility]  Zaid Oneill MD, Internal Medicine  May 20, 2024      Assessment & Plan   Problem List Items Addressed This Visit       HYPERLIPIDEMIA LDL GOAL <130    Relevant Orders    OFFICE/OUTPT VISIT,EST,LEVL IV (Completed)    MICHELLE (obstructive sleep apnea)    Relevant Orders    Magnesium    Comprehensive metabolic panel (BMP + Alb, Alk Phos, ALT, AST, Total. Bili, TP)    OFFICE/OUTPT VISIT,EST,LEVL IV (Completed)    Gastroesophageal reflux disease without esophagitis    Essential hypertension with goal blood pressure less than 140/90    Relevant Orders    Magnesium    Comprehensive metabolic panel (BMP + Alb, Alk Phos, ALT, AST, Total. Bili, TP)    OFFICE/OUTPT VISIT,EST,LEVL IV (Completed)    Diabetes mellitus, type 2 (H)    Relevant Orders    HEMOGLOBIN A1C    Lipid panel reflex to direct LDL Non-fasting    Albumin Random Urine Quantitative with Creat Ratio    Comprehensive metabolic panel (BMP + Alb, Alk Phos, ALT, AST, Total. Bili, TP)    FOOT EXAM (Completed)    OFFICE/OUTPT VISIT,EST,LEVL IV (Completed)     Other Visit Diagnoses       Medicare annual wellness visit, subsequent    -  Primary    Hypertension goal BP (blood pressure) < 140/90        Relevant Medications    metoprolol tartrate (LOPRESSOR) 50 MG tablet    Lump in the groin        Relevant Orders    US Abdomen Limited    OFFICE/OUTPT VISIT,EST,LEVL IV (Completed)    H/O Malignant melanoma        Relevant Orders    US Abdomen Limited    OFFICE/OUTPT VISIT,EST,LEVL IV (Completed)           Patient is here for Medicare wellness exam.  Overall he is doing well he feels a little fatigued but he is mostly pretty active needs to get back to the gym.  Memory is good, immunizations are good, no more colonoscopies.    Other issues addressed his hypertension is well-controlled continue metoprolol amlodipine and refill meds as needed.    Diabetes is doing well continues metformin we will check an A1c  today.    Hyperlipidemia continue atorvastatin refill as needed we will not check fasting lipids today as it is late in the afternoon.    History of malignant melanoma he follows with dermatology at the [facility] and has been doing well has imaging there.    New issue is his lump in his left groin melanoma was on the right side but can be concerning for lymph node on the left side I think it is too soft for lymph node but we will proceed with an ultrasound and get the results back to him.  He will also let the [facility] know when he is down there and having his CT scan if there is a possible lump.            Patient has been advised of split billing requirements and indicates understanding: Yes       Counseling  Appropriate preventive services were discussed with this patient, including applicable screening as appropriate for fall prevention, nutrition, physical activity, Tobacco-use cessation, weight loss and cognition.  Checklist reviewing preventive services available has been given to the patient.  Reviewed patient's diet, addressing concerns and/or questions.   The patient was instructed to see the dentist every 6 months.   The patient was provided with written information regarding signs of hearing loss.   Information on urinary incontinence and treatment options given to patient.     FUTURE APPOINTMENTS:       - Follow-up for annual visit or as needed      Subjective   Merlin is a 83 year old, presenting for the following:  Physical        5/20/2024     3:33 PM   Additional Questions   Roomed by [facility] Directive  Patient does not have a Health Care Directive or Living Will: Patient states has Advance Directive and will bring in a copy to clinic.    HPI    Sleep issues has new cpap parts.  Sometimes sleeps better.     Otherwise feels good, active.  Less working out with being tired.     Blood glucose up to 157 recently  not too bad.     No chest pains no sob, occasional diarrhea with  metformin.     Melanoma followed at [facility] every 6 months for 5 years.         5/20/2024   General Health   How would you rate your overall physical health? Excellent   Feel stress (tense, anxious, or unable to sleep) Patient declined         5/20/2024   Nutrition   Diet: Diabetic         5/20/2024   Exercise   Days per week of moderate/strenous exercise 5 days         5/20/2024   Social Factors   Frequency of gathering with friends or relatives Twice a week   Worry food won't last until get money to buy more Patient declined   Food not last or not have enough money for food? No   Do you have housing?  Yes   Are you worried about losing your housing? No   Lack of transportation? No   Unable to get utilities (heat,electricity)? No         5/20/2024   Fall Risk   Fallen 2 or more times in the past year? No    No   Trouble with walking or balance? No    No          5/20/2024   Activities of Daily Living- Home Safety   Needs help with the following daily activites None of the above   Safety concerns in the home None of the above         5/20/2024   Dental   Dentist two times every year? (!) NO         5/20/2024   Hearing Screening   Hearing concerns? (!) TROUBLE UNDERSTANDING SPEECH ON THE TELEPHONE         5/20/2024   Driving Risk Screening   Patient/family members have concerns about driving No         5/20/2024   General Alertness/Fatigue Screening   Have you been more tired than usual lately? (!) DECLINE         5/20/2024   Urinary Incontinence Screening   Bothered by leaking urine in past 6 months Yes         5/20/2024   TB Screening   Were you born outside of the [place]? No         Today's PHQ-2 Score:       5/20/2024     3:29 PM   PHQ-2 ( 1999 [employer])   Q1: Little interest or pleasure in doing things 0   Q2: Feeling down, depressed or hopeless 0   PHQ-2 Score 0   Q1: Little interest or pleasure in doing things Not at all   Q2: Feeling down, depressed or hopeless Not at all   PHQ-2 Score 0           5/20/2024    Substance Use   Alcohol more than 3/day or more than 7/wk Not Applicable   Do you have a current opioid prescription? No   How severe/bad is pain from 1 to 10? 0/10 (No Pain)   Do you use any other substances recreationally? No    (!) DECLINE     Social History     Tobacco Use    Smoking status: Former     Current packs/day: 0.00     Types: Cigarettes     Quit date: 1974     Years since quittin.4    Smokeless tobacco: Never    Tobacco comments:     Quit     Vaping Use    Vaping status: Never Used   Substance Use Topics    Alcohol use: Yes     Alcohol/week: 4.0 standard drinks of alcohol     Types: 4 Standard drinks or equivalent per week    Drug use: No                       Lab work is in process  Current providers sharing in care for this patient include:  Patient Care Team:  Zaid Oneill MD as PCP - General  Zaid Oneill MD as Assigned PCP  Bobo Campos PA-C as Assigned Sleep Provider    The following health maintenance items are reviewed in Epic and correct as of today:  Health Maintenance   Topic Date Due    ZOSTER IMMUNIZATION (1 of 2) Never done    DIABETIC FOOT EXAM  10/27/2018    EYE EXAM  2022    ANNUAL REVIEW OF HM ORDERS  2023    A1C  08/10/2023    COVID-19 Vaccine ( season) 10/09/2023    MEDICARE ANNUAL WELLNESS VISIT  02/10/2024    BMP  02/10/2024    LIPID  02/10/2024    MICROALBUMIN  02/10/2024    FALL RISK ASSESSMENT  2025    ADVANCE CARE PLANNING  02/10/2028    DTAP/TDAP/TD IMMUNIZATION (3 - Td or Tdap) 2033    PHQ-2 (once per calendar year)  Completed    INFLUENZA VACCINE  Completed    Pneumococcal Vaccine: 65+ Years  Completed    RSV VACCINE (Pregnancy & 60+)  Completed    IPV IMMUNIZATION  Aged Out    HPV IMMUNIZATION  Aged Out    MENINGITIS IMMUNIZATION  Aged Out    RSV MONOCLONAL ANTIBODY  Aged Out         Review of Systems  CONSTITUTIONAL: NEGATIVE for fever, chills, change in weight  INTEGUMENTARY/SKIN: NEGATIVE for worrisome  "rashes, moles or lesions  EYES: NEGATIVE for vision changes or irritation  ENT/MOUTH: NEGATIVE for ear, mouth and throat problems  RESP: NEGATIVE for significant cough or SOB  CV: NEGATIVE for chest pain, palpitations or peripheral edema  GI: NEGATIVE for nausea, abdominal pain, heartburn, or change in bowel habits  : NEGATIVE for frequency, dysuria, or hematuria  MUSCULOSKELETAL: NEGATIVE for significant arthralgias or myalgia  NEURO: NEGATIVE for weakness, dizziness or paresthesias  ENDOCRINE: NEGATIVE for temperature intolerance, skin/hair changes  HEME: NEGATIVE for bleeding problems  PSYCHIATRIC: NEGATIVE for changes in mood or affect     Objective    Exam  /66   Pulse 62   Temp 97.8  F (36.6  C) (Temporal)   Resp 16   Ht 1.803 m (5' 11\")   Wt 80.3 kg (177 lb)   SpO2 97%   BMI 24.69 kg/m     Estimated body mass index is 24.69 kg/m  as calculated from the following:    Height as of this encounter: 1.803 m (5' 11\").    Weight as of this encounter: 80.3 kg (177 lb).    Physical Exam  GENERAL: alert and no distress  EYES: Eyes grossly normal to inspection, PERRL and conjunctivae and sclerae normal  HENT: ear canals and TM's normal, nose and mouth without ulcers or lesions  NECK: no adenopathy, no asymmetry, masses, or scars  RESP: lungs clear to auscultation - no rales, rhonchi or wheezes  CV: regular rate and rhythm, normal S1 S2, no S3 or S4, no murmur, click or rub, no peripheral edema  ABDOMEN: soft, nontender, no hepatosplenomegaly, no masses and bowel sounds normal   (male): left groin has a 2 cm soft lump,   MS: no gross musculoskeletal defects noted, no edema  SKIN: no suspicious lesions or rashes  NEURO: Normal strength and tone, mentation intact and speech normal  PSYCH: mentation appears normal, affect normal/bright        5/20/2024   Mini Cog   Clock Draw Score 2 Normal   3 Item Recall 3 objects recalled   Mini Cog Total Score 5              Signed Electronically by: Zaid Oneill, " MD

## 2024-05-21 LAB
CREAT UR-MCNC: 51 MG/DL
MICROALBUMIN UR-MCNC: <12 MG/L
MICROALBUMIN/CREAT UR: NORMAL MG/G{CREAT}

## 2024-05-29 ENCOUNTER — HOSPITAL ENCOUNTER (OUTPATIENT)
Dept: ULTRASOUND IMAGING | Facility: CLINIC | Age: 84
Discharge: HOME | End: 2024-05-29
Attending: INTERNAL MEDICINE | Admitting: INTERNAL MEDICINE
Payer: MEDICARE

## 2024-05-29 DIAGNOSIS — R22.42 LOCALIZED SWELLING, MASS, OR LUMP OF LOWER EXTREMITY, LEFT: ICD-10-CM

## 2024-05-29 DIAGNOSIS — Z85.820 H/O MALIGNANT MELANOMA: ICD-10-CM

## 2024-05-29 DIAGNOSIS — R19.09 LUMP IN THE GROIN: ICD-10-CM

## 2024-05-29 PROCEDURE — 76882 US LMTD JT/FCL EVL NVASC XTR: CPT | Mod: LT

## 2024-08-06 ENCOUNTER — TRANSFERRED RECORDS (OUTPATIENT)
Dept: HEALTH INFORMATION MANAGEMENT | Facility: CLINIC | Age: 84
End: 2024-08-06
Payer: COMMERCIAL

## 2024-08-06 LAB
ALT SERPL-CCNC: 31 U/L (ref 7–55)
AST SERPL-CCNC: 23 U/L (ref 8–48)
CREATININE (EXTERNAL): 1.07 MG/DL (ref 0.74–1.35)
GFR ESTIMATED (EXTERNAL): 70 ML/MIN/BSA
GLUCOSE (EXTERNAL): 258 MG/DL (ref 70–140)
POTASSIUM (EXTERNAL): 4.1 MMOL/L (ref 3.6–5.2)

## 2024-10-25 DIAGNOSIS — E78.5 HYPERLIPIDEMIA LDL GOAL <130: ICD-10-CM

## 2024-10-25 DIAGNOSIS — E11.9 TYPE 2 DIABETES MELLITUS WITHOUT COMPLICATION (H): ICD-10-CM

## 2024-10-25 RX ORDER — BLOOD SUGAR DIAGNOSTIC
STRIP MISCELLANEOUS
Qty: 100 STRIP | Refills: 3 | Status: SHIPPED | OUTPATIENT
Start: 2024-10-25

## 2024-12-21 ENCOUNTER — HEALTH MAINTENANCE LETTER (OUTPATIENT)
Age: 84
End: 2024-12-21

## 2024-12-28 DIAGNOSIS — E11.9 TYPE 2 DIABETES MELLITUS WITHOUT COMPLICATION, WITHOUT LONG-TERM CURRENT USE OF INSULIN (H): ICD-10-CM

## 2024-12-28 DIAGNOSIS — E87.6 HYPOPOTASSEMIA: ICD-10-CM

## 2024-12-28 DIAGNOSIS — E78.5 HYPERLIPIDEMIA LDL GOAL <130: ICD-10-CM

## 2024-12-28 DIAGNOSIS — I10 ESSENTIAL HYPERTENSION WITH GOAL BLOOD PRESSURE LESS THAN 140/90: ICD-10-CM

## 2024-12-30 RX ORDER — AMILORIDE HYDROCHLORIDE AND HYDROCHLOROTHIAZIDE 5; 50 MG/1; MG/1
1 TABLET ORAL DAILY
Qty: 90 TABLET | Refills: 2 | Status: SHIPPED | OUTPATIENT
Start: 2024-12-30

## 2024-12-30 RX ORDER — POTASSIUM CHLORIDE 1500 MG/1
TABLET, EXTENDED RELEASE ORAL
Qty: 270 TABLET | Refills: 2 | Status: SHIPPED | OUTPATIENT
Start: 2024-12-30

## 2024-12-30 RX ORDER — AMLODIPINE BESYLATE 5 MG/1
5 TABLET ORAL DAILY
Qty: 90 TABLET | Refills: 2 | Status: SHIPPED | OUTPATIENT
Start: 2024-12-30

## 2024-12-30 RX ORDER — ATORVASTATIN CALCIUM 40 MG/1
40 TABLET, FILM COATED ORAL DAILY
Qty: 90 TABLET | Refills: 2 | Status: SHIPPED | OUTPATIENT
Start: 2024-12-30

## 2024-12-31 RX ORDER — METFORMIN HYDROCHLORIDE 500 MG/1
TABLET, EXTENDED RELEASE ORAL
Qty: 360 TABLET | Refills: 2 | Status: SHIPPED | OUTPATIENT
Start: 2024-12-31

## 2025-01-18 DIAGNOSIS — K21.9 GASTROESOPHAGEAL REFLUX DISEASE WITHOUT ESOPHAGITIS: ICD-10-CM

## 2025-02-25 ENCOUNTER — OFFICE VISIT (OUTPATIENT)
Dept: INTERNAL MEDICINE | Facility: CLINIC | Age: 85
End: 2025-02-25
Payer: COMMERCIAL

## 2025-02-25 VITALS
HEART RATE: 55 BPM | TEMPERATURE: 97.8 F | OXYGEN SATURATION: 98 % | BODY MASS INDEX: 23.7 KG/M2 | SYSTOLIC BLOOD PRESSURE: 142 MMHG | RESPIRATION RATE: 16 BRPM | HEIGHT: 72 IN | WEIGHT: 175 LBS | DIASTOLIC BLOOD PRESSURE: 70 MMHG

## 2025-02-25 DIAGNOSIS — E11.9 TYPE 2 DIABETES MELLITUS WITHOUT COMPLICATION, WITHOUT LONG-TERM CURRENT USE OF INSULIN (H): ICD-10-CM

## 2025-02-25 DIAGNOSIS — H02.539 LID LAG: ICD-10-CM

## 2025-02-25 DIAGNOSIS — I10 ESSENTIAL HYPERTENSION WITH GOAL BLOOD PRESSURE LESS THAN 140/90: ICD-10-CM

## 2025-02-25 DIAGNOSIS — Z01.818 PRE-OP EXAM: Primary | ICD-10-CM

## 2025-02-25 PROBLEM — C43.9 METASTATIC MELANOMA (H): Status: RESOLVED | Noted: 2021-06-02 | Resolved: 2025-02-25

## 2025-02-25 LAB
ANION GAP SERPL CALCULATED.3IONS-SCNC: 10 MMOL/L (ref 7–15)
BUN SERPL-MCNC: 12 MG/DL (ref 8–23)
CALCIUM SERPL-MCNC: 9 MG/DL (ref 8.8–10.4)
CHLORIDE SERPL-SCNC: 99 MMOL/L (ref 98–107)
CREAT SERPL-MCNC: 0.99 MG/DL (ref 0.67–1.17)
EGFRCR SERPLBLD CKD-EPI 2021: 76 ML/MIN/1.73M2
EST. AVERAGE GLUCOSE BLD GHB EST-MCNC: 180 MG/DL
GLUCOSE SERPL-MCNC: 205 MG/DL (ref 70–99)
HBA1C MFR BLD: 7.9 %
HCO3 SERPL-SCNC: 28 MMOL/L (ref 22–29)
POTASSIUM SERPL-SCNC: 4.1 MMOL/L (ref 3.4–5.3)
SODIUM SERPL-SCNC: 137 MMOL/L (ref 135–145)

## 2025-02-25 PROCEDURE — 83036 HEMOGLOBIN GLYCOSYLATED A1C: CPT | Performed by: INTERNAL MEDICINE

## 2025-02-25 PROCEDURE — 99214 OFFICE O/P EST MOD 30 MIN: CPT | Performed by: INTERNAL MEDICINE

## 2025-02-25 PROCEDURE — 36415 COLL VENOUS BLD VENIPUNCTURE: CPT | Performed by: INTERNAL MEDICINE

## 2025-02-25 PROCEDURE — G2211 COMPLEX E/M VISIT ADD ON: HCPCS | Performed by: INTERNAL MEDICINE

## 2025-02-25 PROCEDURE — 80048 BASIC METABOLIC PNL TOTAL CA: CPT | Performed by: INTERNAL MEDICINE

## 2025-02-25 RX ORDER — AMLODIPINE BESYLATE 10 MG/1
10 TABLET ORAL DAILY
Qty: 90 TABLET | Refills: 3 | Status: SHIPPED | OUTPATIENT
Start: 2025-02-25

## 2025-02-25 ASSESSMENT — PAIN SCALES - GENERAL: PAINLEVEL_OUTOF10: NO PAIN (0)

## 2025-02-25 NOTE — PROGRESS NOTES
Preoperative Evaluation  [address] [address]  Phone: [phone]  Primary Provider: Zaid Oneill MD  Pre-op Performing Provider: Zaid Oneill MD  Feb 25, 2025 2/24/2025   Surgical Information   What procedure is being done? surgery to repair eyelids    Facility or Hospital where procedure/surgery will be performed: Surgery center at [facility]    Who is doing the procedure / surgery? Dr. Kraft    Date of surgery / procedure: 3/14/2025    Time of surgery / procedure: time not scheduled yet    Where do you plan to recover after surgery? at home with family        Proxy-reported     Fax number for surgical facility: [facility] Fax number : [phone]    Assessment & Plan     The proposed surgical procedure is considered LOW risk.    Problem List Items Addressed This Visit       Essential hypertension with goal blood pressure less than 140/90    Relevant Medications    amLODIPine (NORVASC) 10 MG tablet    Diabetes mellitus, type 2 (H)    Relevant Orders    Hemoglobin A1c    Basic metabolic panel  (Ca, Cl, CO2, Creat, Gluc, K, Na, BUN)     Other Visit Diagnoses       Pre-op exam    -  Primary    Lid lag              Patient is approved for surgery.  Eyelid repair should be a low risk surgery.  He is overall doing well blood pressure is running a little bit high but okay for surgery.  Diabetes is a little bit high at 7.6 but okay continue his metformin.        For elevated blood pressure we will increase his amlodipine from 5 to 10 mg I sent a new prescription for him.     on the day of surgery I will hold his metformin and his diuretic.       The longitudinal plan of care for the diagnosis(es)/condition(s) as documented were addressed during this visit. Due to the added complexity in care, I will continue to support Merlin in the subsequent management and with ongoing continuity of care.     - No identified additional risk  factors other than previously addressed    Antiplatelet or Anticoagulation Medication Instructions   - aspirin: Discontinue aspirin 7 days prior to procedure to reduce bleeding risk. It should be resumed postoperatively.     Additional Medication Instructions   - Diuretics (furosemide, hydrochlorothiazide, chlorothalidone): DO NOT TAKE on the day of surgery.   - metformin: DO NOT TAKE day of surgery.    Recommendation  Approval given to proceed with proposed procedure, without further diagnostic evaluation.    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         2/24/2025   Pre-Op Questionnaire   Have you ever had a heart attack or stroke? No    Have you ever had surgery on your heart or blood vessels, such as a stent placement, a coronary artery bypass, or surgery on an artery in your head, neck, heart, or legs? No    Do you have chest pain with activity? No    Do you have a history of heart failure? No    Do you currently have a cold, bronchitis or symptoms of other infection? No    Do you have a cough, shortness of breath, or wheezing? No    Do you or anyone in your family have previous history of blood clots? No    Do you or does anyone in your family have a serious bleeding problem such as prolonged bleeding following surgeries or cuts? No    Have you ever had problems with anemia or been told to take iron pills? No    Have you had any abnormal blood loss such as black, tarry or bloody stools? No    Have you ever had a blood transfusion? No    Are you willing to have a blood transfusion if it is medically needed before, during, or after your surgery? Yes    Have you or any of your relatives ever had problems with anesthesia? No    Do you have sleep apnea, excessive snoring or daytime  drowsiness? (!) YES    Do you have a CPAP machine? Yes    Do you have any artifical heart valves or other implanted medical devices like a pacemaker, defibrillator, or continuous glucose monitor? No    Do you have artificial joints? No    Are you allergic to latex? No        Proxy-reported     Health Care Directive  Patient does not have a Health Care Directive: Discussed advance care planning with patient; information given to patient to review.    Preoperative Review of    reviewed - no record of controlled substances prescribed.      Status of Chronic Conditions:  DIABETES - Patient has a longstanding history of DiabetesType Type II . Patient is being treated with oral agents and denies significant side effects. Control has been good. Complicating factors include but are not limited to: hypertension and hyperlipidemia.     HYPERLIPIDEMIA - Patient has a long history of significant Hyperlipidemia requiring medication for treatment with recent good control. Patient reports no problems or side effects with the medication.     HYPERTENSION - Patient has longstanding history of HTN , currently denies any symptoms referable to elevated blood pressure. Specifically denies chest pain, palpitations, dyspnea, orthopnea, PND or peripheral edema. Blood pressure readings have been in normal range. Current medication regimen is as listed below. Patient denies any side effects of medication.     Patient Active Problem List    Diagnosis Date Noted    Diabetes mellitus, type 2 (H) 01/02/2023     Priority: Medium    Metastatic melanoma (H) 06/02/2021     Priority: Medium    Acute right-sided low back pain with right-sided sciatica 10/27/2017     Priority: Medium    Triceps strain, initial encounter 10/27/2017     Priority: Medium    Essential hypertension with goal blood pressure less than 140/90 07/26/2016     Priority: Medium    Gastroesophageal reflux disease without esophagitis 01/20/2016     Priority: Medium    Anxiety  attack 08/12/2015     Priority: Medium    MICHELLE (obstructive sleep apnea) 09/03/2013     Priority: Medium    HYPERLIPIDEMIA LDL GOAL <130 10/31/2010     Priority: Medium    Hearing loss      Priority: Medium     Problem list name updated by automated process. Provider to review      Inguinal hernia      Priority: Medium     Problem list name updated by automated process. Provider to review      Prostatitis      Priority: Medium     Problem list name updated by automated process. Provider to review      Pyelonephritis      Priority: Medium     Problem list name updated by automated process. Provider to review        Past Medical History:   Diagnosis Date    Amblyopia of eye, left     Arthritis     Bilateral cataracts 2016    Diabetes (H)     Diabetic eye exam (H) 12/17/2014    Esophageal reflux     Hypersomnia with sleep apnea, unspecified     Inguinal hernia without mention of obstruction or gangrene, unilateral or unspecified, (not specified as recurrent)     s/p repair    Melanoma (H) 07/10/2015    web space between 2 & 3 toe, bx to periphery of margin.  Amputation of 2&3 toe, margins clear    Mixed hyperlipidemia     Other isolated or specific phobias     claustrophobia    Presbyopia     Prostatitis, unspecified     Pyelonephritis, unspecified     Unspecified essential hypertension     Unspecified hearing loss      Past Surgical History:   Procedure Laterality Date    AMPUTATE TOE(S) Right 07/31/2015    Procedure: AMPUTATE TOE(S);  Surgeon: Neal Slater MD;  Location: [facility] OR    BIOPSY  biopsy of lymph node    2020    cataract extraction & yag laser capsulotomy Bilateral 2016    COLONOSCOPY  04/15/2009    COLONOSCOPY N/A 11/01/2017    Procedure: COLONOSCOPY;  Colonoscopy;  Surgeon: Chapin Rodriguez MD;  Location: [facility] GI    COLONOSCOPY N/A 07/31/2020    Procedure: Colonoscopy;  Surgeon: Chapin Rodriguez MD;  Location: [facility]    EXCISE MASS FOOT Right 07/10/2015    Procedure: EXCISE MASS FOOT;  Surgeon: Sofi  Nickolas Graves DPM;  Location:  OR    EYE SURGERY  cataract with lens implant        REPAIR SYNDACTYLY FOOT Right 07/10/2015    Procedure: REPAIR SYNDACTYLY FOOT;  Surgeon: Nickolas Farah DPM;  Location:  OR    [facility] LAP,HERNIA REPAIR PROC,UNLIST       Current Outpatient Medications   Medication Sig Dispense Refill    aMILoride-hydrochlorothiazide (MODURETIC) 5-50 MG TABS per tablet TAKE 1 TABLET EVERY DAY 90 tablet 2    amLODIPine (NORVASC) 5 MG tablet TAKE 1 TABLET EVERY DAY 90 tablet 2    ASPIRIN 81 MG OR TABS 1 tab po QD (Once per day) 100 3    atorvastatin (LIPITOR) 40 MG tablet TAKE 1 TABLET EVERY DAY 90 tablet 2    blood glucose (ACCU-CHEK SMARTVIEW) test strip USE 1 STRIP TO CHECK GLUCOSE ONCE DAILY 100 strip 3    blood glucose monitoring (ACCU-CHEK FASTCLIX) lancets Use 1 needle 2-3 times 250 each 3    Cholecalciferol (VITAMIN D PO) Take by mouth daily      fluticasone (FLONASE) 50 MCG/ACT nasal spray Spray 1 spray into both nostrils daily 16 g 0    metFORMIN (GLUCOPHAGE XR) 500 MG 24 hr tablet TAKE 2 TABLETS TWICE DAILY WITH MEALS 360 tablet 2    metoprolol tartrate (LOPRESSOR) 50 MG tablet TAKE 2 TABLETS IN THE MORNING  AND TAKE 1 TABLET EVERY NIGHT Strength: 50 mg 270 tablet 3    mometasone (ELOCON) 0.1 % external cream Apply topically daily 45 g 1    omeprazole (PRILOSEC) 20 MG DR capsule TAKE 1 CAPSULE EVERY DAY 90 capsule 3    order for DME Equipment ordered: RESMED Auto PAP Mask type: Full face  Settings: 8-15 cm h2o      potassium chloride misael ER (KLOR-CON M20) 20 MEQ CR tablet TAKE 1 TABLET THREE TIMES DAILY 270 tablet 2       Allergies   Allergen Reactions    Sulfa Antibiotics      rash        Social History     Tobacco Use    Smoking status: Former     Current packs/day: 0.00     Average packs/day: 1 pack/day for 10.0 years (10.0 ttl pk-yrs)     Types: Cigarettes     Quit date: 1974     Years since quittin.1    Smokeless tobacco: Never    Tobacco comments:     Quit    "  Substance Use Topics    Alcohol use: Yes     Alcohol/week: 4.0 standard drinks of alcohol     Types: 4 Standard drinks or equivalent per week     Comment: Occasional glass of wine or beer. less than weekly       History   Drug Use Unknown             Review of Systems  CONSTITUTIONAL: NEGATIVE for fever, chills, change in weight  ENT/MOUTH: NEGATIVE for ear, mouth and throat problems  RESP: NEGATIVE for significant cough or SOB  CV: NEGATIVE for chest pain, palpitations or peripheral edema    Objective    BP (!) 142/70   Pulse 55   Temp 97.8  F (36.6  C) (Temporal)   Resp 16   Ht 1.82 m (5' 11.65\")   Wt 79.4 kg (175 lb)   SpO2 98%   BMI 23.96 kg/m     Estimated body mass index is 23.96 kg/m  as calculated from the following:    Height as of this encounter: 1.82 m (5' 11.65\").    Weight as of this encounter: 79.4 kg (175 lb).  Physical Exam  GENERAL: alert and no distress  NECK: no adenopathy, no asymmetry, masses, or scars  RESP: lungs clear to auscultation - no rales, rhonchi or wheezes  CV: regular rate and rhythm, normal S1 S2, no S3 or S4, no murmur, click or rub, no peripheral edema  ABDOMEN: soft, nontender, no hepatosplenomegaly, no masses and bowel sounds normal  MS: no gross musculoskeletal defects noted, no edema    Recent Labs   Lab Test 05/20/24  1621   *   POTASSIUM 4.0   CR 1.16   A1C 7.6*        Diagnostics  Labs pending at this time.  Results will be reviewed when available.   No EKG required, no history of coronary heart disease, significant arrhythmia, peripheral arterial disease or other structural heart disease.    Revised Cardiac Risk Index (RCRI)  The patient has the following serious cardiovascular risks for perioperative complications:   - No serious cardiac risks = 0 points     RCRI Interpretation: 1 point: Class II (low risk - 0.9% complication rate)         Signed Electronically by: Zaid Oneill MD  A copy of this evaluation report is provided to the requesting " physician.

## 2025-03-14 ENCOUNTER — TRANSFERRED RECORDS (OUTPATIENT)
Dept: HEALTH INFORMATION MANAGEMENT | Facility: CLINIC | Age: 85
End: 2025-03-14
Payer: COMMERCIAL

## 2025-03-24 ENCOUNTER — NURSE TRIAGE (OUTPATIENT)
Dept: INTERNAL MEDICINE | Facility: CLINIC | Age: 85
End: 2025-03-24
Payer: COMMERCIAL

## 2025-03-24 DIAGNOSIS — I10 ESSENTIAL HYPERTENSION WITH GOAL BLOOD PRESSURE LESS THAN 140/90: ICD-10-CM

## 2025-03-24 DIAGNOSIS — E87.6 HYPOPOTASSEMIA: ICD-10-CM

## 2025-03-24 NOTE — TELEPHONE ENCOUNTER
RN Triage    Patient Contact    Attempt # 1    Was call answered?  No.  Phone not answered, no voicemail. Sent [employer] message.     Daniel Gonzales RN on 3/24/2025 at 12:37 PM

## 2025-03-25 RX ORDER — POTASSIUM CHLORIDE 1500 MG/1
20 TABLET, EXTENDED RELEASE ORAL 4 TIMES DAILY
Qty: 360 TABLET | Refills: 3 | Status: SHIPPED | OUTPATIENT
Start: 2025-03-25 | End: 2025-03-27

## 2025-03-25 NOTE — TELEPHONE ENCOUNTER
RN Triage    Patient Contact    [id number] # [id number]    Was call answered?  No.    Unable to leave VM, no VM set-up  Elvia Kim RN on 3/25/2025 at 8:18 AM

## 2025-03-25 NOTE — TELEPHONE ENCOUNTER
Labs were ok in Feb for potassium and kidneys so ok to do 4 a day, new script done.     Try to get diabetes better to help less urination.

## 2025-03-25 NOTE — TELEPHONE ENCOUNTER
Called and spoke with Patient. Advised of providers message below. Patient verbalizes understanding and agrees with plan. Verifies has enough medication on hand until mail order arrives. Denies further questions at time of call.    Leslie Bower RN on 3/25/2025 at 4:16 PM

## 2025-03-25 NOTE — TELEPHONE ENCOUNTER
Nurse Triage SBAR    Is this a 2nd Level Triage? YES, LICENSED PRACTITIONER REVIEW IS REQUIRED    Situation: Patient sent MyChart regarding leg pain, potassium supplements.     Background: Patient states his prescription for potassium is to take 3 tabs per day. He states during the summer time the doctor told him he could increase to 4 tabs per day due to it being hot out, sweating, etc. He states he has been doing 4 tabs per day (2 tabs twice daily) since last summer.     Assessment: Patient states he decreased his potassium supplements back to 3 tabs per day a few days back, because he remembered he was supposed to go back to that dosing after summer ended. He states now he has had intermittent BLE cramping for about 3 days. He states yesterday it was especially bad with sharp pains, rating the worst pain at 7-8/10. States it would only last a few minutes at a time, and if he got up to walk the pains/cramping would resolve. He states he has not had any pains or cramps so far today. He denies any redness, warmth, swelling, fevers, chest pain, SOB, numbness. His wife mentioned in MyChart that he is drinking water and having frequent bathroom visits. When writer asked patient about this, he states he thinks it's just part of his diabetes, states it all depends on how much water he drinks. States if he drinks a lot of water, he will urinate a lot. He states he does not think he is urinating more frequently than his baseline.     Protocol Recommended Disposition:   See in Office Within 3 Days    Recommendation: Per protocol, patient should be seen in office within 3 days. No appointments available with PCP within this time frame. Offered to check for appointments with another provider, but patient would like a message sent to PCP first.  Patient would like to know if an appointment is necessary, or if PCP would like him to go back to taking 4 potassium per day as he feels like this may be contributing to the cramps.  Advised patient to call back or seek urgent/emergency care for any new or worsening symptoms. He verbalized understanding and had no other questions or concerns.       JAROD Bradford, RN        Reason for Disposition   MODERATE pain (e.g., interferes with normal activities, limping) and present > 3 days    Additional Information   Negative: Looks like a broken bone or dislocated joint (e.g., crooked or deformed)   Negative: Sounds like a life-threatening emergency to the triager   Negative: Followed a hip injury   Negative: Followed a knee injury   Negative: Followed an ankle injury   Negative: Back pain radiating (shooting) into leg(s)   Negative: Foot pain is main symptom   Negative: Ankle pain is main symptom   Negative: Knee pain is main symptom   Negative: Leg swelling is main symptom   Negative: Chest pain   Negative: Difficulty breathing   Negative: Entire foot is cool or blue in comparison to other side   Negative: Unable to walk   Negative: Fever and red area (or area very tender to touch)   Negative: Swollen joint and fever   Negative: Thigh or calf pain in only one leg and present > 1 hour   Negative: Thigh, calf, or ankle swelling in only one leg   Negative: Thigh, calf, or ankle swelling in both legs, but one side is definitely more swollen   Negative: History of prior 'blood clot' in leg or lungs (i.e., deep vein thrombosis, pulmonary embolism)   Negative: History of inherited increased risk of blood clots (e.g., factor 5 Leiden, antithrombin 3, protein C or protein S deficiency, prothrombin mutation)   Negative: Major surgery in past month   Negative: Hip or leg fracture (broken bone) in past month (or had cast on leg or ankle in past month)   Negative: Illness requiring prolonged bedrest in past month (e.g., immobilization, long hospital stay)   Negative: Long-distance travel in past month (e.g., car, bus, train, plane; with trip lasting 6 or more hours)   Negative: Cancer treatment in past six  months (or has cancer now)   Negative: Patient sounds very sick or weak to the triager   Negative: SEVERE pain (e.g., excruciating, unable to do any normal activities)   Negative: Cast on leg or ankle and now has increasing pain   Negative: Red area or streak > 2 inches (or 5 cm)   Negative: Painful rash with multiple small blisters grouped together (i.e., dermatomal distribution or 'band' or 'stripe')   Negative: Looks like a boil, infected sore, deep ulcer, or other infected rash (spreading redness, pus)   Negative: Localized rash is very painful (no fever)   Negative: Numbness in a leg or foot (i.e., loss of sensation)   Negative: Localized pain, redness or hard lump along vein   Negative: Patient wants to be seen    Protocols used: Leg Pain-A-[facility]

## 2025-03-27 DIAGNOSIS — E87.6 HYPOPOTASSEMIA: ICD-10-CM

## 2025-03-27 DIAGNOSIS — I10 ESSENTIAL HYPERTENSION WITH GOAL BLOOD PRESSURE LESS THAN 140/90: ICD-10-CM

## 2025-03-27 RX ORDER — POTASSIUM CHLORIDE 1500 MG/1
20 TABLET, EXTENDED RELEASE ORAL 4 TIMES DAILY
Qty: 360 TABLET | Refills: 3 | Status: SHIPPED | OUTPATIENT
Start: 2025-03-27

## 2025-04-28 DIAGNOSIS — K21.9 GASTROESOPHAGEAL REFLUX DISEASE WITHOUT ESOPHAGITIS: ICD-10-CM

## 2025-04-28 RX ORDER — OMEPRAZOLE 20 MG/1
20 CAPSULE, DELAYED RELEASE ORAL DAILY
Qty: 90 CAPSULE | Refills: 1 | Status: SHIPPED | OUTPATIENT
Start: 2025-04-28

## 2025-04-28 NOTE — TELEPHONE ENCOUNTER
Patient uses mail order and will be out of medication in one week.    No refills at mail order per patient.    Isabela Monsalve XRO/

## 2025-05-08 ENCOUNTER — PATIENT OUTREACH (OUTPATIENT)
Dept: CARE COORDINATION | Facility: CLINIC | Age: 85
End: 2025-05-08
Payer: COMMERCIAL

## 2025-05-31 ENCOUNTER — NURSE TRIAGE (OUTPATIENT)
Dept: NURSING | Facility: CLINIC | Age: 85
End: 2025-05-31
Payer: COMMERCIAL

## 2025-06-01 NOTE — TELEPHONE ENCOUNTER
Nurse Triage SBAR    Is this a 2nd Level Triage? YES, LICENSED PRACTITIONER REVIEW IS REQUIRED    Situation:  Head injury / fall    Background:  Pt's spouse on the line, consent on file. She reports pt lost his footing on the second step up and fell backwards hitting his head on a end table. Reports this happened about 30 minutes ago. Pt doesn't take any blood thinners besides ASA daily.     Assessment:  Reports pt is alert and oriented. States he has a small bump a little larger than a quarter. Denies any bleeding or break to the skin.     Protocol Recommended Disposition:   See HCP Within 4 Hours (Or PCP Triage)    Recommendation:  2LT, paged Dr. Ward at 2155. Back up provider Dr. Estrada reached and advised below:    MD advised:   - Ok to monitor at home  - Care advise and observation recommendations reviewed for when to call back:        Writer called pt back at [phone] to discuss the above recommendations. Protocol and care advice reviewed. Advised to call back with any new or worsening signs, symptoms, concerns, or questions. They verbalized understanding and agreed to follow advice given.      [facility] Provider consult indicated.    Reason for page: Head injury    Specialty Group number: [id number]   Specialty Group: [facility]-Family [facility]    Initial page sent to Dr. Ward by RN at 2155.     [facility] No response from Dr. Ward.     Specialty Group number (same as initial page): [id number]   Specialty Group (same as initial page): [facility]-Family Medicine    Second page sent to Dr. Ward (same provider as initial page sent) by RN at [phone].    [facility] No response from Dr. Ward. Two attempts made to on-call provider.     Back up Specialty Group number: [id number]   Back up on-call Specialty Group: -Family Medicine    Back up provider, Dr. Estrada, consulted via call made by Answering Service at [phone].     Bess Michele, RN       Paged to provider    Does the patient meet one of the  "following criteria for ADS visit consideration? 16+ years old, with an [facility] PCP     TIP  Providers, please consider if this condition is appropriate for management at one of our Acute and Diagnostic Services sites.     If patient is a good candidate, please use dotphrase <dot>triageresponse and select Refer to ADS to document.    Reason for Disposition   [1] Age over 64 years AND [2] swelling or bruise   Scalp swelling, bruise or pain    Additional Information   Negative: [1] ACUTE NEURO SYMPTOM AND [2] present now  (DEFINITION: difficult to awaken OR confused thinking and talking OR slurred speech OR weakness of arms OR unsteady walking)   Negative: Knocked out (unconscious) > 1 minute   Negative: Seizure (convulsion) occurred  (Exception: Prior history of seizures and now alert and without Acute Neuro Symptoms.)   Negative: Penetrating head injury (e.g., knife, gun shot wound, metal object)   Negative: [1] Major bleeding (e.g., actively dripping or spurting) AND [2] can't be stopped   Negative: [1] Dangerous mechanism of injury (e.g., MVA, diving, trampoline, contact sports, fall > 10 feet or 3 meters) AND [2] NECK pain AND [3] began < 1 hour after injury   Negative: Sounds like a life-threatening emergency to the triager   Negative: Can't remember what happened (amnesia)   Negative: Vomiting once or more   Negative: [1] Loss of vision or double vision AND [2] present now   Negative: Watery or blood-tinged fluid dripping from the NOSE or EARS now  (Exception: Tears from crying or nosebleed from nasal trauma.)   Negative: [1] One or two \"black eyes\" (bruising, purple color of eyelids) AND [2] onset within 24 hours of head injury   Negative: Large swelling or bruise > 2 inches (5 cm)   Negative: [1] Bleeding AND [2] won't stop after 10 minutes of direct pressure (using correct technique)   Negative: Sounds like a serious injury to the triager   Negative: Skin is split open or gaping  (or length > 1/2 inch or 12 " mm)   Negative: [1] ACUTE NEURO SYMPTOM AND [2] now fine  (DEFINITION: difficult to awaken OR confused thinking and talking OR slurred speech OR weakness of arms OR unsteady walking)   Negative: [1] Knocked out (unconscious) < 1 minute AND [2] now fine   Negative: [1] SEVERE headache AND [2] not improved 2 hours after pain medicine/ice packs   Negative: Dangerous injury (e.g., MVA, diving, trampoline, contact sports, fall > 10 feet or 3 meters) or severe blow from hard object (e.g., golf club or baseball bat)   Negative: Taking Coumadin (warfarin) or other strong blood thinner, or known bleeding disorder (e.g., thrombocytopenia)   Negative: Suspicious history for the injury    Protocols used: Head Injury-A-AH

## 2025-06-02 ENCOUNTER — NURSE TRIAGE (OUTPATIENT)
Dept: INTERNAL MEDICINE | Facility: CLINIC | Age: 85
End: 2025-06-02
Payer: COMMERCIAL

## 2025-06-02 NOTE — TELEPHONE ENCOUNTER
Nurse Triage SBAR    Is this a 2nd Level Triage? NO    Situation: Wife calls following up regarding headache after falling 5/31/25. See nurse triage       Background: 84 year old male - fell on 5/31 around 9:30 pm. He was coming down the steps and lost his balance, fell backwards and hit his head on an end table.       Assessment:   Bump is resolving to left side, back of head   Wife denies any other injuries   Headache began yesterday 6/1/25 and continues today   - rates headache pain 1-2/10   - states it is only present when he is moving   Headache started 6/1/25  Whole head   Just when he moves 1-2/10    Acting normal   No numbness or weakness    Taking Ibuprofen - not working well       Protocol Recommended Disposition:   Home Care      Recommendation: Gave care advise. Writer instructed patient/ wife to call back or be seen in urgent care if symptoms worsen or new symptoms arise. Advised to call back if headache continues past 3 days - tomorrow evening. Wife is thankful and agreeable with plan. No further questions at this time.          Reason for Disposition   Scalp bruise, pain, or swelling    Additional Information   Negative: ACUTE NEURO SYMPTOM and symptom present now (Definition: Difficult to awaken OR confused thinking and talking OR slurred speech OR weakness of arms or legs OR unsteady walking.)   Negative: Knocked out (unconscious) > 1 minute   Negative: Seizure (convulsion) occurred  (Exception: Prior history of seizures and now alert and without Acute Neuro Symptoms.)   Negative: Neck pain after dangerous injury (e.g., MVA, diving, trampoline, contact sports, fall > 10 feet or 3 meters)  (Exception: Neck pain began > 1 hour after injury.)   Negative: Major bleeding (actively dripping or spurting) that can't be stopped   Negative: Penetrating head injury (e.g., knife, gunshot wound, metal object)   Negative: Sounds like a life-threatening emergency to the triager   Negative: Diagnosed with a  concussion within last 14 days   Negative: Can't remember what happened (amnesia)   Negative: Vomiting once or more   Negative: Loss of vision or double vision is present now   Negative: Watery or blood-tinged fluid dripping from the nose or ears   Negative: ACUTE NEURO SYMPTOM and now fine  (Definition: Difficult to awaken OR confused thinking and talking OR slurred speech OR weakness of arms or legs OR unsteady walking.)   Negative: Knocked out (unconscious) < 1 minute and now fine   Negative: SEVERE headache   Negative: Dangerous injury (e.g., MVA, diving, trampoline, contact sports, fall > 10 feet or 3 meters) or severe blow from hard object (e.g., golf club or baseball bat)   Negative: Large swelling or bruise (> 2 inches or 5 cm)   Negative: Skin is split open or gaping (length > 1/2 inch or 12 mm)   Negative: Bleeding won't stop after 10 minutes of direct pressure (using correct technique)   Negative: Taking Coumadin (warfarin) or other strong blood thinner, or known bleeding disorder (e.g., thrombocytopenia)   Negative: Age over 64 years with an area of head swelling or bruise   Negative: Sounds like a serious injury to the triager   Negative: Patient is confused or is an unreliable provider of information (e.g., dementia, severe intellectual disability, alcohol intoxication)   Negative: No prior tetanus shots (or is not fully vaccinated) and any wound (e.g., cut or scrape)   Negative: HIV positive or severe immunodeficiency (severely weak immune system) and DIRTY cut or scrape   Negative: One or two 'black eyes' (bruising, purple color of eyelids), onset over 24 hours after head injury   Negative: Patient wants to be seen   Negative: Last tetanus shot > 5 years ago and DIRTY cut or scrape   Negative: Last tetanus shot > 10 years ago and CLEAN cut or scrape   Negative: After 3 days and headache persists   Negative: Suspicious history for the injury    Protocols used: Head Injury-A-[place]

## 2025-07-23 ENCOUNTER — OFFICE VISIT (OUTPATIENT)
Dept: INTERNAL MEDICINE | Facility: CLINIC | Age: 85
End: 2025-07-23
Payer: COMMERCIAL

## 2025-07-23 VITALS
RESPIRATION RATE: 18 BRPM | OXYGEN SATURATION: 98 % | TEMPERATURE: 98.2 F | BODY MASS INDEX: 23.03 KG/M2 | HEIGHT: 72 IN | SYSTOLIC BLOOD PRESSURE: 138 MMHG | DIASTOLIC BLOOD PRESSURE: 70 MMHG | HEART RATE: 64 BPM | WEIGHT: 170.06 LBS

## 2025-07-23 DIAGNOSIS — E87.6 HYPOPOTASSEMIA: ICD-10-CM

## 2025-07-23 DIAGNOSIS — E78.5 HYPERLIPIDEMIA LDL GOAL <130: ICD-10-CM

## 2025-07-23 DIAGNOSIS — K21.9 GASTROESOPHAGEAL REFLUX DISEASE WITHOUT ESOPHAGITIS: ICD-10-CM

## 2025-07-23 DIAGNOSIS — I10 HYPERTENSION GOAL BP (BLOOD PRESSURE) < 140/90: ICD-10-CM

## 2025-07-23 DIAGNOSIS — Z00.00 ENCOUNTER FOR MEDICARE ANNUAL WELLNESS EXAM: Primary | ICD-10-CM

## 2025-07-23 DIAGNOSIS — Z85.820 H/O MALIGNANT MELANOMA: ICD-10-CM

## 2025-07-23 DIAGNOSIS — G47.33 OSA (OBSTRUCTIVE SLEEP APNEA): ICD-10-CM

## 2025-07-23 DIAGNOSIS — E11.9 TYPE 2 DIABETES MELLITUS WITHOUT COMPLICATION, WITHOUT LONG-TERM CURRENT USE OF INSULIN (H): ICD-10-CM

## 2025-07-23 DIAGNOSIS — I10 ESSENTIAL HYPERTENSION WITH GOAL BLOOD PRESSURE LESS THAN 140/90: ICD-10-CM

## 2025-07-23 LAB
ALBUMIN SERPL BCG-MCNC: 4.5 G/DL (ref 3.5–5.2)
ALP SERPL-CCNC: 56 U/L (ref 40–150)
ALT SERPL W P-5'-P-CCNC: 26 U/L (ref 0–70)
ANION GAP SERPL CALCULATED.3IONS-SCNC: 11 MMOL/L (ref 7–15)
AST SERPL W P-5'-P-CCNC: 22 U/L (ref 0–45)
BILIRUB SERPL-MCNC: 0.7 MG/DL
BUN SERPL-MCNC: 16.9 MG/DL (ref 8–23)
CALCIUM SERPL-MCNC: 9.5 MG/DL (ref 8.8–10.4)
CHLORIDE SERPL-SCNC: 98 MMOL/L (ref 98–107)
CHOLEST SERPL-MCNC: 83 MG/DL
CREAT SERPL-MCNC: 0.98 MG/DL (ref 0.67–1.17)
CREAT UR-MCNC: 77.8 MG/DL
EGFRCR SERPLBLD CKD-EPI 2021: 77 ML/MIN/1.73M2
EST. AVERAGE GLUCOSE BLD GHB EST-MCNC: 160 MG/DL
FASTING STATUS PATIENT QL REPORTED: YES
FASTING STATUS PATIENT QL REPORTED: YES
GLUCOSE SERPL-MCNC: 142 MG/DL (ref 70–99)
HBA1C MFR BLD: 7.2 %
HCO3 SERPL-SCNC: 28 MMOL/L (ref 22–29)
HDLC SERPL-MCNC: 29 MG/DL
LDLC SERPL CALC-MCNC: 41 MG/DL
MICROALBUMIN UR-MCNC: <12 MG/L
MICROALBUMIN/CREAT UR: NORMAL MG/G{CREAT}
NONHDLC SERPL-MCNC: 54 MG/DL
POTASSIUM SERPL-SCNC: 4.2 MMOL/L (ref 3.4–5.3)
PROT SERPL-MCNC: 7.2 G/DL (ref 6.4–8.3)
SODIUM SERPL-SCNC: 137 MMOL/L (ref 135–145)
TRIGL SERPL-MCNC: 66 MG/DL

## 2025-07-23 PROCEDURE — 3075F SYST BP GE 130 - 139MM HG: CPT | Performed by: INTERNAL MEDICINE

## 2025-07-23 PROCEDURE — G2211 COMPLEX E/M VISIT ADD ON: HCPCS | Performed by: INTERNAL MEDICINE

## 2025-07-23 PROCEDURE — 1126F AMNT PAIN NOTED NONE PRSNT: CPT | Performed by: INTERNAL MEDICINE

## 2025-07-23 PROCEDURE — 80061 LIPID PANEL: CPT | Performed by: INTERNAL MEDICINE

## 2025-07-23 PROCEDURE — 82570 ASSAY OF URINE CREATININE: CPT | Performed by: INTERNAL MEDICINE

## 2025-07-23 PROCEDURE — 3078F DIAST BP <80 MM HG: CPT | Performed by: INTERNAL MEDICINE

## 2025-07-23 PROCEDURE — 82043 UR ALBUMIN QUANTITATIVE: CPT | Performed by: INTERNAL MEDICINE

## 2025-07-23 PROCEDURE — 80053 COMPREHEN METABOLIC PANEL: CPT | Performed by: INTERNAL MEDICINE

## 2025-07-23 PROCEDURE — G0439 PPPS, SUBSEQ VISIT: HCPCS | Performed by: INTERNAL MEDICINE

## 2025-07-23 PROCEDURE — 36415 COLL VENOUS BLD VENIPUNCTURE: CPT | Performed by: INTERNAL MEDICINE

## 2025-07-23 PROCEDURE — 99214 OFFICE O/P EST MOD 30 MIN: CPT | Mod: 25 | Performed by: INTERNAL MEDICINE

## 2025-07-23 PROCEDURE — 83036 HEMOGLOBIN GLYCOSYLATED A1C: CPT | Performed by: INTERNAL MEDICINE

## 2025-07-23 RX ORDER — POTASSIUM CHLORIDE 1500 MG/1
20 TABLET, EXTENDED RELEASE ORAL 4 TIMES DAILY
Qty: 360 TABLET | Refills: 3 | Status: SHIPPED | OUTPATIENT
Start: 2025-07-23

## 2025-07-23 RX ORDER — METOPROLOL TARTRATE 50 MG
TABLET ORAL
Qty: 270 TABLET | Refills: 3 | Status: SHIPPED | OUTPATIENT
Start: 2025-07-23

## 2025-07-23 RX ORDER — AMILORIDE HYDROCHLORIDE AND HYDROCHLOROTHIAZIDE 5; 50 MG/1; MG/1
1 TABLET ORAL DAILY
Qty: 90 TABLET | Refills: 3 | Status: SHIPPED | OUTPATIENT
Start: 2025-07-23

## 2025-07-23 RX ORDER — AMLODIPINE BESYLATE 10 MG/1
10 TABLET ORAL DAILY
Qty: 90 TABLET | Refills: 3 | Status: SHIPPED | OUTPATIENT
Start: 2025-07-23

## 2025-07-23 RX ORDER — METFORMIN HYDROCHLORIDE 500 MG/1
1000 TABLET, EXTENDED RELEASE ORAL 2 TIMES DAILY WITH MEALS
Qty: 360 TABLET | Refills: 3 | Status: SHIPPED | OUTPATIENT
Start: 2025-07-23

## 2025-07-23 RX ORDER — ATORVASTATIN CALCIUM 40 MG/1
40 TABLET, FILM COATED ORAL DAILY
Qty: 90 TABLET | Refills: 3 | Status: SHIPPED | OUTPATIENT
Start: 2025-07-23

## 2025-07-23 RX ORDER — OMEPRAZOLE 20 MG/1
20 CAPSULE, DELAYED RELEASE ORAL DAILY
Qty: 90 CAPSULE | Refills: 3 | Status: SHIPPED | OUTPATIENT
Start: 2025-07-23

## 2025-07-23 SDOH — HEALTH STABILITY: PHYSICAL HEALTH: ON AVERAGE, HOW MANY MINUTES DO YOU ENGAGE IN EXERCISE AT THIS LEVEL?: 0 MIN

## 2025-07-23 SDOH — HEALTH STABILITY: PHYSICAL HEALTH: ON AVERAGE, HOW MANY DAYS PER WEEK DO YOU ENGAGE IN MODERATE TO STRENUOUS EXERCISE (LIKE A BRISK WALK)?: 0 DAYS

## 2025-07-23 ASSESSMENT — SOCIAL DETERMINANTS OF HEALTH (SDOH): HOW OFTEN DO YOU GET TOGETHER WITH FRIENDS OR RELATIVES?: ONCE A WEEK

## 2025-07-23 ASSESSMENT — PAIN SCALES - GENERAL: PAINLEVEL_OUTOF10: NO PAIN (0)

## 2025-07-23 NOTE — PROGRESS NOTES
Preventive Care Visit  Formerly [facility]  Zaid Oneill MD, Internal Medicine  Jul 23, 2025      Assessment & Plan   Problem List Items Addressed This Visit       HYPERLIPIDEMIA LDL GOAL <130    Relevant Medications    atorvastatin (LIPITOR) 40 MG tablet    MICHELLE (obstructive sleep apnea)    Gastroesophageal reflux disease without esophagitis    Relevant Medications    omeprazole (PRILOSEC) 20 MG DR capsule    Essential hypertension with goal blood pressure less than 140/90    Relevant Medications    aMILoride-hydrochlorothiazide (MODURETIC) 5-50 MG TABS per tablet    amLODIPine (NORVASC) 10 MG tablet    potassium chloride misael ER (KLOR-CON M20) 20 MEQ CR tablet    Other Relevant Orders    Albumin Random Urine Quantitative with Creat Ratio    Comprehensive metabolic panel (BMP + Alb, Alk Phos, ALT, AST, Total. Bili, TP)    Diabetes mellitus, type 2 (H)    Relevant Medications    metFORMIN (GLUCOPHAGE XR) 500 MG 24 hr tablet    Other Relevant Orders    Lipid panel reflex to direct LDL Fasting    Albumin Random Urine Quantitative with Creat Ratio    Hemoglobin A1c    Comprehensive metabolic panel (BMP + Alb, Alk Phos, ALT, AST, Total. Bili, TP)     Other Visit Diagnoses         Encounter for Medicare annual wellness exam    -  Primary      Hypopotassemia        Relevant Medications    potassium chloride misael ER (KLOR-CON M20) 20 MEQ CR tablet      H/O Malignant melanoma          Hypertension goal BP (blood pressure) < 140/90        Relevant Medications    metoprolol tartrate (LOPRESSOR) 50 MG tablet           Patient is here for Medicare wellness exam.  He is doing okay lives with his wife in their house he is active.  He has been very busy traveling and doing the summer.  Colonoscopy was done in 2020 he will check with oncology if they want another one he will consider doing another one as it has been 5 years of family history but he is over the age of 82.    No longer doing  PSA  Immunizations are good with pneumonia shots are up-to-date tetanus all up-to-date recommend he get a flu shot and COVID shot in the fall.    Memory is okay 2 out of 3, did not do well on his clock but feels his memory is doing fine    Other issues addressed history of malignant melanoma follows at the [facility] and will go back in August I will be 5 years out and should be stable from there.    Diabetes on metformin doing okay sugars are running a little high 130-150 we will check his A1c which was 7.9 may need to consider adding a medication he does not have kidney failure, heart failure or any reason for SGL T or a GLP-1 with no obesity made to start him on glipizide if he needs additional treatment.    Hypertension he does run a little bit high on his blood pressure he will continue to watch this, continue his medications of metoprolol, diuretic, and amlodipine.    Reflux disease on omeprazole doing well that is refilled    Obstructive sleep apnea with CPAP tolerates it well    Hyperlipidemia on statin we will check his lipids and refill the medication.    The longitudinal plan of care for the diagnosis(es)/condition(s) as documented were addressed during this visit. Due to the added complexity in care, I will continue to support Merlin in the subsequent management and with ongoing continuity of care.                  Patient has been advised of split billing requirements and indicates understanding: Yes    Counseling  Appropriate preventive services were addressed with this patient via screening, questionnaire, or discussion as appropriate for fall prevention, nutrition, physical activity, Tobacco-use cessation, social engagement, weight loss and cognition.  Checklist reviewing preventive services available has been given to the patient.  Reviewed patient's diet, addressing concerns and/or questions.   The patient was instructed to see the dentist every 6 months.   Reviewed preventive health counseling,  as reflected in patient instructions       Regular exercise       Healthy diet/nutrition    Follow-up   No follow-ups on file.     Follow-up Visit   Expected date:  Jul 30, 2026 (Approximate)      Follow Up Appointment Details:     Follow-up with whom?: PCP    Follow-Up for what?: Medicare Wellness    Welcome or Annual?: Annual Wellness    How?: In Person                 Subjective Merlin is a 84 year old, presenting for the following:  Wellness Visit        7/23/2025     6:56 AM   Additional Questions   Roomed by Heidi PAGE     Doing ok active, busy.   Frustrated with his glucose and not eating sugary things. Weight down 5 pounds.   Taking metformin 4 a day,   Taking metoprolol, amlodipine, diuretic    Morning sugars 139 today, up and down.      Eyelids were done and doing ok.     Melanoma is gone and follows up at [place], been 5 years.     MICHELLE and wearing cpap doing well.      Sleeps better with magnesium and tylenol, occasional anxiety at night. Gets up and walks off anxiety.     Advance Care Planning  Patient states has Health Care Directive and will send to [employer].        7/23/2025   General Health   How would you rate your overall physical health? Excellent   Feel stress (tense, anxious, or unable to sleep) Not at all         7/23/2025   Nutrition   Diet: Diabetic    I don't know       Multiple values from one day are sorted in reverse-chronological order         7/23/2025   Exercise   Days per week of moderate/strenous exercise 0 days   Average minutes spent exercising at this level 0 min   (!) EXERCISE CONCERN      7/23/2025   Social Factors   Frequency of gathering with friends or relatives Once a week   Worry food won't last until get money to buy more No   Food not last or not have enough money for food? No   Do you have housing? (Housing is defined as stable permanent housing and does not include staying outside in a car, in a tent, in an abandoned building, in an overnight shelter,  or couch-surfing.) Yes   Are you worried about losing your housing? No   Lack of transportation? No   Unable to get utilities (heat,electricity)? No         2025   Fall Risk   Fallen 2 or more times in the past year? No   Trouble with walking or balance? No   Gait Speed Test (Document in seconds) 3.43   Gait Speed Test Interpretation Less than or equal to 5.00 seconds - PASS          2025   Activities of Daily Living- Home Safety   Needs help with the following daily activites None of the above   Safety concerns in the home None of the above         2025   Dental   Dentist two times every year? (!) NO         2025   Hearing Screening   Hearing concerns? None of the above         2025   Driving Risk Screening   Patient/family members have concerns about driving No         2025   General Alertness/Fatigue Screening   Have you been more tired than usual lately? No         2025   Urinary Incontinence Screening   Bothered by leaking urine in past 6 months No         Today's PHQ-2 Score:       2025     7:03 AM   PHQ-2 ( 1999 Pf[employer])   Q1: Little interest or pleasure in doing things 0   Q2: Feeling down, depressed or hopeless 0   PHQ-2 Score 0         2025   Substance Use   Alcohol more than 3/day or more than 7/wk No   Do you have a current opioid prescription? No   How severe/bad is pain from 1 to 10? 0/10 (No Pain)   Do you use any other substances recreationally? No     Social History     Tobacco Use    Smoking status: Former     Current packs/day: 0.00     Average packs/day: 1 pack/day for 10.0 years (10.0 ttl pk-yrs)     Types: Cigarettes     Quit date: 1974     Years since quittin.5    Smokeless tobacco: Never    Tobacco comments:     Quit     Vaping Use    Vaping status: Never Used   Substance Use Topics    Alcohol use: Yes     Alcohol/week: 4.0 standard drinks of alcohol     Types: 4 Standard drinks or equivalent per week     Comment: Occasional glass of  wine or beer. less than weekly    Drug use: Never         Reviewed and updated as needed this visit by Provider                    Lab work is in process  Current providers sharing in care for this patient include:  Patient Care Team:  Zaid Oneill MD as PCP - General  Zaid Oneill MD as Assigned PCP    The following health maintenance items are reviewed in Epic and correct as of today:  Health Maintenance   Topic Date Due    ZOSTER VACCINE (1 of 2) Never done    COVID-19 VACCINE (8 - 2024-25 season) 04/23/2025    MEDICARE ANNUAL WELLNESS VISIT  05/20/2025    LIPID  05/20/2025    DIABETIC FOOT EXAM  05/20/2025    MICROALBUMIN  05/21/2025    EYE EXAM  07/27/2025    A1C  08/25/2025    INFLUENZA VACCINE (1) 09/01/2025    BMP  02/25/2026    ANNUAL REVIEW OF HM ORDERS  02/25/2026    FALL RISK ASSESSMENT  07/23/2026    ADVANCE CARE PLANNING  02/25/2030    DTAP/TDAP/TD VACCINE (3 - Td or Tdap) 08/14/2033    PHQ-2 (once per calendar year)  Completed    PNEUMOCOCCAL VACCINE 50+ YEARS  Completed    HPV VACCINE (No Doses Required) Completed    RSV VACCINE  Completed    MENINGITIS VACCINE  Aged Out         Review of Systems  CONSTITUTIONAL: NEGATIVE for fever, chills, change in weight  INTEGUMENTARY/SKIN: NEGATIVE for worrisome rashes, moles or lesions  EYES: NEGATIVE for vision changes or irritation  ENT/MOUTH: NEGATIVE for ear, mouth and throat problems  RESP: NEGATIVE for significant cough or SOB  BREAST: NEGATIVE for masses, tenderness or discharge  CV: NEGATIVE for chest pain, palpitations or peripheral edema  GI: NEGATIVE for nausea, abdominal pain, heartburn, or change in bowel habits  : NEGATIVE for frequency, dysuria, or hematuria  MUSCULOSKELETAL: NEGATIVE for significant arthralgias or myalgia  NEURO: NEGATIVE for weakness, dizziness or paresthesias  ENDOCRINE: NEGATIVE for temperature intolerance, skin/hair changes  HEME: NEGATIVE for bleeding problems  PSYCHIATRIC: NEGATIVE for changes in mood or affect    "  Objective    Exam  BP (!) 142/70   Pulse 64   Temp 98.2  F (36.8  C) (Temporal)   Resp 18   Ht 1.83 m (6' 0.05\")   Wt 77.1 kg (170 lb 1 oz)   SpO2 98%   BMI 23.03 kg/m     Estimated body mass index is 23.03 kg/m  as calculated from the following:    Height as of this encounter: 1.83 m (6' 0.05\").    Weight as of this encounter: 77.1 kg (170 lb 1 oz).    Physical Exam  GENERAL: alert and no distress  EYES: Eyes grossly normal to inspection, PERRL and conjunctivae and sclerae normal  HENT: ear canals and TM's normal, nose and mouth without ulcers or lesions  NECK: no adenopathy, no asymmetry, masses, or scars  RESP: lungs clear to auscultation - no rales, rhonchi or wheezes  CV: regular rate and rhythm, normal S1 S2, no S3 or S4, no murmur, click or rub, no peripheral edema  ABDOMEN: soft, nontender, no hepatosplenomegaly, no masses and bowel sounds normal  MS: no gross musculoskeletal defects noted, no edema  SKIN: no suspicious lesions or rashes  NEURO: Normal strength and tone, mentation intact and speech normal  PSYCH: mentation appears normal, affect normal/bright  Gait and balance assessed per Gait Speed Test.  Result as above.        7/23/2025   Mini Cog   Clock Draw Score 0 Abnormal   3 Item Recall 2 objects recalled   Mini Cog Total Score 2              Signed Electronically by: Zaid Oneill MD    "

## 2025-07-23 NOTE — PATIENT INSTRUCTIONS
Patient Education   Preventive Care Advice   This is general advice given by our system to help you stay healthy. However, your care team may have specific advice just for you. Please talk to your care team about your preventive care needs.  Nutrition  Eat 5 or more servings of fruits and vegetables each day.  Try wheat bread, brown rice and whole grain pasta (instead of white bread, rice, and pasta).  Get enough calcium and vitamin D. Check the label on foods and aim for 100% of the RDA (recommended daily allowance).  Lifestyle  Exercise at least 150 minutes each week  (30 minutes a day, 5 days a week).  Do muscle strengthening activities 2 days a week. These help control your weight and prevent disease.  No smoking.  Wear sunscreen to prevent skin cancer.  Have a dental exam and cleaning every 6 months.  Yearly exams  See your health care team every year to talk about:  Any changes in your health.  Any medicines your care team has prescribed.  Preventive care, family planning, and ways to prevent chronic diseases.  Shots (vaccines)   HPV shots (up to age 26), if you've never had them before.  Hepatitis B shots (up to age 59), if you've never had them before.  COVID-19 shot: Get this shot when it's due.  Flu shot: Get a flu shot every year.  Tetanus shot: Get a tetanus shot every 10 years.  Pneumococcal, hepatitis A, and RSV shots: Ask your care team if you need these based on your risk.  Shingles shot (for age 50 and up)  General health tests  Diabetes screening:  Starting at age 35, Get screened for diabetes at least every 3 years.  If you are younger than age 35, ask your care team if you should be screened for diabetes.  Cholesterol test: At age 39, start having a cholesterol test every 5 years, or more often if advised.  Bone density scan (DEXA): At age 50, ask your care team if you should have this scan for osteoporosis (brittle bones).  Hepatitis C: Get tested at least once in your life.  STIs (sexually  transmitted infections)  Before age 24: Ask your care team if you should be screened for STIs.  After age 24: Get screened for STIs if you're at risk. You are at risk for STIs (including HIV) if:  You are sexually active with more than one person.  You don't use condoms every time.  You or a partner was diagnosed with a sexually transmitted infection.  If you are at risk for HIV, ask about PrEP medicine to prevent HIV.  Get tested for HIV at least once in your life, whether you are at risk for HIV or not.  Cancer screening tests  Cervical cancer screening: If you have a cervix, begin getting regular cervical cancer screening tests starting at age 21.  Breast cancer scan (mammogram): If you've ever had breasts, begin having regular mammograms starting at age 40. This is a scan to check for breast cancer.  Colon cancer screening: It is important to start screening for colon cancer at age 45.  Have a colonoscopy test every 10 years (or more often if you're at risk) Or, ask your provider about stool tests like a FIT test every year or Cologuard test every 3 years.  To learn more about your testing options, visit:   .  For help making a decision, visit:   https://bit.ly/ub38664.  Prostate cancer screening test: If you have a prostate, ask your care team if a prostate cancer screening test (PSA) at age 55 is right for you.  Lung cancer screening: If you are a current or former smoker ages 50 to 80, ask your care team if ongoing lung cancer screenings are right for you.  For informational purposes only. Not to replace the advice of your health care provider. Copyright   2023 [facility]. All rights reserved. Clinically reviewed by the [facility]. Amitive [id number] - REV 01/24.  Preventing Falls: Care Instructions  Injuries and health problems such as trouble walking or poor eyesight can increase your risk of falling. So can some medicines. But there are things you can do to help  "prevent falls. You can exercise to get stronger. You can also arrange your home to make it safer.    Talk to your doctor about the medicines you take. Ask if any of them increase the risk of falls and whether they can be changed or stopped.   Try to exercise regularly. It can help improve your strength and balance. This can help lower your risk of falling.         Practice fall safety and prevention.   Wear low-heeled shoes that fit well and give your feet good support. Talk to your doctor if you have foot problems that make this hard.  Carry a cellphone or wear a medical alert device that you can use to call for help.  Use stepladders instead of chairs to reach high objects. Don't climb if you're at risk for falls. Ask for help, if needed.  Wear the correct eyeglasses, if you need them.        Make your home safer.   Remove rugs, cords, clutter, and furniture from walkways.  Keep your house well lit. Use night-lights in hallways and bathrooms.  Install and use sturdy handrails on stairways.  Wear nonskid footwear, even inside. Don't walk barefoot or in socks without shoes.        Be safe outside.   Use handrails, curb cuts, and ramps whenever possible.  Keep your hands free by using a shoulder bag or backpack.  Try to walk in well-lit areas. Watch out for uneven ground, changes in pavement, and debris.  Be careful in the winter. Walk on the grass or gravel when sidewalks are slippery. Use de-icer on steps and walkways. Add non-slip devices to shoes.    Put grab bars and nonskid mats in your shower or tub and near the toilet. Try to use a shower chair or bath bench when bathing.   Get into a tub or shower by putting in your weaker leg first. Get out with your strong side first. Have a phone or medical alert device in the bathroom with you.   Where can you learn more?  Go to https://www.[employer].net/patiented  Enter G117 in the search box to learn more about \"Preventing Falls: Care Instructions.\"  Current as of: " July 31, 2024  Content Version: 14.5    3357-1105 [employer].   Care instructions adapted under license by your healthcare professional. If you have questions about a medical condition or this instruction, always ask your healthcare professional. [employer] disclaims any warranty or liability for your use of this information.

## (undated) RX ORDER — FENTANYL CITRATE 50 UG/ML
INJECTION, SOLUTION INTRAMUSCULAR; INTRAVENOUS
Status: DISPENSED
Start: 2020-07-31

## (undated) RX ORDER — LIDOCAINE HYDROCHLORIDE 10 MG/ML
INJECTION, SOLUTION EPIDURAL; INFILTRATION; INTRACAUDAL; PERINEURAL
Status: DISPENSED
Start: 2020-07-31

## (undated) RX ORDER — FENTANYL CITRATE 50 UG/ML
INJECTION, SOLUTION INTRAMUSCULAR; INTRAVENOUS
Status: DISPENSED
Start: 2017-11-01